# Patient Record
Sex: MALE | Race: WHITE | NOT HISPANIC OR LATINO | Employment: OTHER | URBAN - METROPOLITAN AREA
[De-identification: names, ages, dates, MRNs, and addresses within clinical notes are randomized per-mention and may not be internally consistent; named-entity substitution may affect disease eponyms.]

---

## 2017-01-11 ENCOUNTER — TELEPHONE (OUTPATIENT)
Dept: CARDIOLOGY | Facility: CLINIC | Age: 57
End: 2017-01-11

## 2017-01-11 NOTE — TELEPHONE ENCOUNTER
I left a message for the patient to call for the results of his Zio patch.  Per Dr. Ventura, rare PACs and PVCs.

## 2017-01-11 NOTE — TELEPHONE ENCOUNTER
I talked to the patient about the results of his Zio patch.  There really is no EKG evidence for his syncopal episodes.  He states that he did have multiple episodes while he was wearing the patch.  I do not think that his syncopal episodes are cardiac in etiology.  He recently had some lab work done that showed some abnormalities in his thyroid and other hormones.  He is going to see an endocrinologist for this.

## 2017-01-24 ENCOUNTER — OFFICE VISIT (OUTPATIENT)
Dept: ENDOCRINOLOGY | Age: 57
End: 2017-01-24

## 2017-01-24 VITALS
BODY MASS INDEX: 27.34 KG/M2 | WEIGHT: 213 LBS | HEART RATE: 68 BPM | HEIGHT: 74 IN | SYSTOLIC BLOOD PRESSURE: 108 MMHG | DIASTOLIC BLOOD PRESSURE: 78 MMHG

## 2017-01-24 DIAGNOSIS — R79.89 LOW TESTOSTERONE LEVEL IN MALE: ICD-10-CM

## 2017-01-24 DIAGNOSIS — E03.8 OTHER SPECIFIED HYPOTHYROIDISM: Primary | ICD-10-CM

## 2017-01-24 PROCEDURE — 99244 OFF/OP CNSLTJ NEW/EST MOD 40: CPT | Performed by: INTERNAL MEDICINE

## 2017-01-24 RX ORDER — LEVOTHYROXINE SODIUM 0.07 MG/1
75 TABLET ORAL DAILY
Qty: 30 TABLET | Refills: 11 | Status: SHIPPED | OUTPATIENT
Start: 2017-01-24 | End: 2017-08-24 | Stop reason: SDUPTHER

## 2017-01-24 NOTE — PROGRESS NOTES
Cc : Hypothyroidism     Joel Keating 56 y.o. WM presents as a new patient for the evaluation of Hypothyroidism. Referred by Dr. Hoover.   Diagnosed with Hypothyroidism for about 7 - 8 years, based on routine blood work up.   Pt has been started on Liothyronine 25 mcg oral daily.     He complains of feeling tired all the time thinks it is secondary to fibromyalgia, weight is stable but lost about 60 pounds in the last 8 years, c/o constipation and c/o diarrhea but constipation is more common, c/o hair loss from his chest, underarms and arms, c/o increased sweating, with night sweats. c/o heat intolerance and no cold intolerance. c/o tremors, c/o racing of heart due to orthostatic hypotension and tachycardia and no eye symptoms.   Denied c/o difficulty breathing, swallowing and reports change in voice.   No family hx of thyroid disease.     DEXA scan - 05/11/2015 - Normal bone scan.     Patient also is concerned about his testosterone, he reports that his levels have been checked by his physician and that they have been low.  He is concerned with the symptoms of decreased energy, decreased libido, loss of hair.  Patient is on hydrocodone and methadone.  History of blood clots    I have reviewed the patient's allergies, medicines, past medical hx, family hx and social hx in detail.    Past Medical History   Diagnosis Date   • Abdominal pain      Recurrent sx and follows with Dr. Heard.   • Abscess of buttock    • Acute gastritis    • Allergic rhinitis    • Alopecia    • Angina pectoris    • Anxiety    • Arteriosclerotic coronary artery disease    • Benign prostatic hypertrophy (BPH) with nocturia    • Calf tenderness    • Cervical neck pain with evidence of disc disease    • DDD (degenerative disc disease), lumbar    • Degeneration of cervical intervertebral disc    • Depression    • Esophageal reflux      Follows with Dr. Jackson   • Fatigue    • Fever 04/2015     Low grade symptoms of fever for 3 weeks.   •  Fibromyalgia    • Gastroparesis    • Health care maintenance    • Heart palpitations    • History of deep vein thrombophlebitis of lower extremity 2003     DVT Dx 2003   • Hypokalemia    • Idiopathic peripheral neuropathy    • Impaired fasting glucose    • Insomnia    • Lichen planus      tongue   • LOM (loss of memory)    • Mental status change      Episodes where he stares ahead and have some shaking of his limbs.  Possible seizure activity.  Referred to Dr. Santana, October 2, 2013.   • Nausea    • Onychomycosis of toenail    • Osteoarthritis of hip    • PN (peripheral neuropathy)    • Primary hypothyroidism    • Rash    • Sleep apnea    • Tendonitis of left hip    • Tendonitis of right hip    • TIA (transient ischemic attack) 06/2016   • Tinea versicolor        Family History   Problem Relation Age of Onset   • Heart failure Mother      Congestive   • Heart disease Mother    • Atrial fibrillation Mother    • Alcohol abuse Paternal Uncle    • Heart failure Paternal Grandmother      Congestive   • No Known Problems Father    • No Known Problems Sister    • No Known Problems Brother    • Heart failure Maternal Grandmother    • Heart disease Maternal Grandmother    • No Known Problems Maternal Grandfather    • No Known Problems Paternal Grandfather        Social History     Social History   • Marital status:      Spouse name: N/A   • Number of children: 2   • Years of education: N/A     Occupational History   • Retired      Social History Main Topics   • Smoking status: Never Smoker   • Smokeless tobacco: Former User     Types: Chew   • Alcohol use No   • Drug use: No   • Sexual activity: Defer     Other Topics Concern   • Not on file     Social History Narrative       Allergies   Allergen Reactions   • Butorphanol    • Iodinated Diagnostic Agents    • Iodine    • Other      Vistra 650 Tabs   • Phenytoin    • Amoxicillin Rash   • Clindamycin/Lincomycin Rash         Current Outpatient Prescriptions:   •   escitalopram (LEXAPRO) 20 MG tablet, Take 1 tablet by mouth daily., Disp: , Rfl:   •  esomeprazole (NEXIUM 24HR) 20 MG capsule, Take 20 mg by mouth every morning before breakfast., Disp: , Rfl:   •  fluticasone (FLONASE) 50 MCG/ACT nasal spray, 1 spray into each nostril 2 (two) times a day. Administer 2 sprays in each nostril for each dose., Disp: 3 each, Rfl: 3  •  gabapentin (NEURONTIN) 600 MG tablet, Take 1 tablet by mouth 6 (six) times a day., Disp: , Rfl:   •  HYDROcodone-acetaminophen (NORCO) 7.5-325 MG per tablet, Take 1 tablet by mouth 3 (three) times a day as needed., Disp: , Rfl:   •  lisdexamfetamine (VYVANSE) 70 MG capsule, Take 1 capsule by mouth every morning., Disp: , Rfl:   •  meloxicam (MOBIC) 7.5 MG tablet, Take 7.5 mg by mouth daily as needed., Disp: , Rfl:   •  methadone (DOLOPHINE) 10 MG tablet, Take 20 mg by mouth 2 (two) times a day as needed , , Disp: , Rfl:   •  nortriptyline (PAMELOR) 75 MG capsule, Take 1 capsule by mouth daily., Disp: , Rfl:   •  ondansetron (ZOFRAN) 4 MG tablet, Take 4 mg by mouth every 6 (six) hours as needed for nausea or vomiting. Take for nausea and vomiting., Disp: , Rfl:   •  Sennosides (SENNA) 8.6 MG capsule, Take 1 capsule by mouth daily as needed., Disp: , Rfl:   •  sucralfate (CARAFATE) 1 GM/10ML suspension, Take 10 mL by mouth 4 (four) times a day as needed., Disp: , Rfl:   •  tamsulosin (FLOMAX) 0.4 MG capsule 24 hr capsule, Take 1 capsule by mouth daily., Disp: , Rfl:   •  TiZANidine (ZANAFLEX) 4 MG capsule, Take 1-2 capsules by mouth at night as needed., Disp: , Rfl:   •  Unable to find, Domperidone POWD; 1 tablet twice daily., Disp: , Rfl:   •  levothyroxine (SYNTHROID) 75 MCG tablet, Take 1 tablet by mouth Daily., Disp: 30 tablet, Rfl: 11     Review of Systems   Constitutional: Positive for appetite change and fatigue. Negative for chills and diaphoresis.   HENT: Positive for hearing loss, trouble swallowing and voice change. Negative for nosebleeds and  "postnasal drip.    Eyes: Positive for visual disturbance. Negative for photophobia and discharge.   Respiratory: Positive for shortness of breath. Negative for cough and wheezing.    Cardiovascular: Positive for leg swelling. Negative for chest pain and palpitations.   Gastrointestinal: Positive for abdominal pain, constipation, diarrhea, nausea and vomiting. Negative for abdominal distention.   Endocrine: Positive for heat intolerance. Negative for cold intolerance, polydipsia and polyuria.   Genitourinary: Negative for dysuria, frequency and hematuria.   Musculoskeletal: Positive for arthralgias, back pain and myalgias.   Skin: Positive for rash. Negative for pallor and wound.   Neurological: Positive for dizziness, tremors, syncope, weakness, numbness and headaches. Negative for seizures.   Hematological: Negative for adenopathy.   Psychiatric/Behavioral: Positive for agitation, confusion and sleep disturbance. The patient is not nervous/anxious.        Objective:    Visit Vitals   • /78   • Pulse 68   • Ht 74\" (188 cm)   • Wt 213 lb (96.6 kg)   • BMI 27.35 kg/m2       Physical Exam   Constitutional: He is oriented to person, place, and time. He appears well-developed and well-nourished.   HENT:   Head: Normocephalic and atraumatic.   Mouth/Throat: Oropharynx is clear and moist.   Eyes: Conjunctivae and EOM are normal. Pupils are equal, round, and reactive to light.   Neck: Normal range of motion. Neck supple. Carotid bruit is not present. No tracheal deviation present. No thyromegaly present.   Thyroid palpable and no nodules felt   Cardiovascular: Normal rate and regular rhythm.    Murmur heard.  Pulmonary/Chest: Effort normal and breath sounds normal. No stridor. He has no wheezes.   Abdominal: Soft. Bowel sounds are normal. He exhibits no distension. There is no tenderness. No hernia.   Musculoskeletal: Normal range of motion. He exhibits no edema.   Lymphadenopathy:     He has no cervical adenopathy. "   Neurological: He is alert and oriented to person, place, and time. He has normal reflexes.   Skin: Skin is warm and dry.   Psychiatric: He has a normal mood and affect. His behavior is normal. Judgment normal.   Vitals reviewed.      Results Review:    I reviewed the patient's new clinical results.    Admission on 01/06/2017, Discharged on 01/06/2017   Component Date Value Ref Range Status   • Rapid Strep A Screen 01/06/2017 Negative  Negative, VALID, INVALID, Not Performed Final   • Internal Control 01/06/2017 Passed  Passed Final   • Lot Number 01/06/2017 [id number]   Final   • Expiration Date 01/06/2017 6/2018   Final         Diagnoses and all orders for this visit:    Other specified hypothyroidism  -     TSH; Future  -     Hemoglobin A1c; Future  -     TestT+TestF+SHBG; Future  -     Lipid Panel; Future  -     T3, Free; Future  -     T4, Free; Future  -     Thyroid Peroxidase (TPO) Ab; Future    Low testosterone level in male  -     TSH; Future  -     Hemoglobin A1c; Future  -     TestT+TestF+SHBG; Future  -     Lipid Panel; Future  -     T3, Free; Future  -     T4, Free; Future  -     Thyroid Peroxidase (TPO) Ab; Future    Other orders  -     levothyroxine (SYNTHROID) 75 MCG tablet; Take 1 tablet by mouth Daily.    Hypothyroidism  Advised patient to stop Cytomel  Will start patient on levothyroxin 75 µg oral daily  Will check TSH, free T4, TPO antibody prior to next visit    Low testosterone levels  Advised patient that we would discuss more about this issue during his next visit  However we had to go extensively about the side effects of testosterone given his prior history of blood clots.  Also explained to the patient that his testosterone levels could be low due to the fact that he is on the medications.    The total time spent more than 60 min for old record and lab review and face- to- face, of which greater than 50% of time was spent in counseling the patient on treatment options, instructions for  management/treatment and follow up and importance of compliance with chosen management or treatment options.       Janelle Serna MD  01/24/17

## 2017-01-24 NOTE — MR AVS SNAPSHOT
[facility] ENDOCRINOLOGY  [phone]                    Joel Keating   1/24/2017 1:45 PM   Office Visit    Dept Phone:  [phone]   Encounter #:  [id number]    Provider:  Janelle Serna MD   Department:  [facility] ENDOCRINOLOGY                Your Full Care Plan              Today's Medication Changes          These changes are accurate as of: 1/24/17  2:42 PM.  If you have any questions, ask your nurse or doctor.               New Medication(s)Ordered:     levothyroxine 75 MCG tablet   Commonly known as:  SYNTHROID   Take 1 tablet by mouth Daily.   Started by:  Janelle Serna MD         Stop taking medication(s)listed here:     azithromycin 250 MG tablet   Commonly known as:  ZITHROMAX   Stopped by:  Jnaelle Serna MD           liothyronine 25 MCG tablet   Commonly known as:  CYTOMEL   Stopped by:  Janelle Serna MD           uribel 118 MG capsule capsule   Stopped by:  Janelle Serna MD                Where to Get Your Medications      These medications were sent to [address] [phone] [facility] [phone] [place] [address]     Phone:  [phone]     levothyroxine 75 MCG tablet                  Your Updated Medication List          This list is accurate as of: 1/24/17  2:42 PM.  Always use your most recent med list.                CARAFATE 1 GM/10ML suspension   Generic drug:  sucralfate       fluticasone 50 MCG/ACT nasal spray   Commonly known as:  FLONASE   1 spray into each nostril 2 (two) times a day. Administer 2 sprays in each nostril for each dose.       gabapentin 600 MG tablet   Commonly known as:  NEURONTIN       HYDROcodone-acetaminophen 7.5-325 MG per tablet   Commonly known as:  NORCO       levothyroxine 75 MCG tablet   Commonly known as:  SYNTHROID   Take 1 tablet by mouth Daily.       LEXAPRO 20 MG tablet   Generic drug:  escitalopram          meloxicam 7.5 MG tablet   Commonly known as:  MOBIC       methadone 10 MG tablet   Commonly known as:  DOLOPHINE       NEXIUM 24HR 20 MG capsule   Generic drug:  esomeprazole       nortriptyline 75 MG capsule   Commonly known as:  PAMELOR       ondansetron 4 MG tablet   Commonly known as:  ZOFRAN       Senna 8.6 MG capsule       tamsulosin 0.4 MG capsule 24 hr capsule   Commonly known as:  FLOMAX       TiZANidine 4 MG capsule   Commonly known as:  ZANAFLEX       Unable to find       VYVANSE 70 MG capsule   Generic drug:  lisdexamfetamine               You Were Diagnosed With        Codes Comments    Other specified hypothyroidism    -  Primary ICD-10-CM: E03.8  ICD-9-CM: 244.8     Low testosterone level in male     ICD-10-CM: E29.1  ICD-9-CM: 257.2       Instructions     None    Patient Instructions History      Upcoming Appointments     Visit Type Date Time Department    NEW PATIENT 2017  1:45 PM [facility]    FOLLOW UP 2017  9:15 AM [facility] [place]    [facility] 2017  8:40 AM [facility]    OFFICE VISIT 2017  9:45 AM [facility]      [facility] Signup     [facility] [employer] allows you to send messages to your doctor, view your test results, renew your prescriptions, schedule appointments, and more. To sign up, go to [employer] and click on the Sign Up Now link in the New User? box. Enter your [employer] Activation Code exactly as it appears below along with the last four digits of your Social Security Number and your Date of Birth () to complete the sign-up process. If you do not sign up before the expiration date, you must request a new code.    [employer] Activation Code: [email or web address]  Expires: 2017  2:39 PM    If you have questions, you can email [email or web address] or call [phone] to talk to our Cloud[employer] staff. Remember, Cloud[employer] is NOT to be used for urgent needs. For medical emergencies, dial 911.               Other Info from  "Your Visit           Your Appointments     Jan 25, 2017  9:15 AM EST   Follow Up with Brady Ventura MD   [facility] CARDIOLOGY (--)    [address]  [facility] [address] [phone]           Arrive 15 minutes prior to appointment.            Apr 11, 2017  8:40 AM EDT   [facility] with [facility], [facility]   [facility] ENDOCRINOLOGY (--)    [address]  [place] [address]   [phone]            Apr 24, 2017  9:45 AM EDT   Office Visit with Janelle Serna MD   [facility] ENDOCRINOLOGY (--)    [address]  [facility] [address] [phone]           Arrive 15 minutes prior to appointment.              Allergies     Butorphanol      Iodinated Diagnostic Agents      Iodine      Other      Vistra 650 Tabs    Phenytoin      Amoxicillin  Rash    Clindamycin/lincomycin  Rash      Vital Signs     Blood Pressure Pulse Height Weight Body Mass Index Smoking Status    108/78 68 74\" (188 cm) 213 lb (96.6 kg) 27.35 kg/m2 Never Smoker      Problems and Diagnoses Noted     Underactive thyroid    Low testosterone level in male            "

## 2017-01-24 NOTE — LETTER
January 24, 2017     Cesar Hoover MD  [address] [facility]  [address]  [place] [id number]    Patient: Joel Keating   YOB: 1960   Date of Visit: 1/24/2017       Dear Dr. Kiko MD:    Thank you for referring Joel Keating to me for evaluation. Below are the relevant portions of my assessment and plan of care.                   If you have questions, please do not hesitate to call me. I look forward to following Joel along with you.         Sincerely,        Janelle Serna MD        CC: No Recipients

## 2017-01-24 NOTE — LETTER
January 24, 2017     Cesar Hoover MD  [address] [facility]  [address]  [place] [id number]    Patient: Joel Keating   YOB: 1960   Date of Visit: 1/24/2017       Dear Dr. Kiko MD:    Joel Zuri was in my office today. Below are the relevant portions of my assessment and plan of care.           If you have questions, please do not hesitate to call me. I look forward to following Joel along with you.         Sincerely,        Janelle Serna MD        CC: No Recipients

## 2017-01-25 ENCOUNTER — OFFICE VISIT (OUTPATIENT)
Dept: CARDIOLOGY | Facility: CLINIC | Age: 57
End: 2017-01-25

## 2017-01-25 VITALS
HEART RATE: 99 BPM | DIASTOLIC BLOOD PRESSURE: 62 MMHG | WEIGHT: 210 LBS | HEIGHT: 74 IN | SYSTOLIC BLOOD PRESSURE: 116 MMHG | BODY MASS INDEX: 26.95 KG/M2

## 2017-01-25 DIAGNOSIS — I95.1 ORTHOSTASIS: ICD-10-CM

## 2017-01-25 DIAGNOSIS — I25.10 CORONARY ARTERY DISEASE INVOLVING NATIVE HEART WITHOUT ANGINA PECTORIS, UNSPECIFIED VESSEL OR LESION TYPE: Primary | ICD-10-CM

## 2017-01-25 DIAGNOSIS — R55 SYNCOPE, UNSPECIFIED SYNCOPE TYPE: ICD-10-CM

## 2017-01-25 PROCEDURE — 99213 OFFICE O/P EST LOW 20 MIN: CPT | Performed by: INTERNAL MEDICINE

## 2017-01-25 PROCEDURE — 93000 ELECTROCARDIOGRAM COMPLETE: CPT | Performed by: INTERNAL MEDICINE

## 2017-01-25 NOTE — MR AVS SNAPSHOT
Joel Keating   1/25/2017 9:15 AM   Office Visit    Dept Phone:  [phone]   Encounter #:  [id number]    Provider:  Brady Ventura MD   Department:  [facility] CARDIOLOGY                Your Full Care Plan              Your Updated Medication List          This list is accurate as of: 1/25/17  9:36 AM.  Always use your most recent med list.                CARAFATE 1 GM/10ML suspension   Generic drug:  sucralfate       fluticasone 50 MCG/ACT nasal spray   Commonly known as:  FLONASE   1 spray into each nostril 2 (two) times a day. Administer 2 sprays in each nostril for each dose.       gabapentin 600 MG tablet   Commonly known as:  NEURONTIN       HYDROcodone-acetaminophen 7.5-325 MG per tablet   Commonly known as:  NORCO       levothyroxine 75 MCG tablet   Commonly known as:  SYNTHROID   Take 1 tablet by mouth Daily.       LEXAPRO 20 MG tablet   Generic drug:  escitalopram       meloxicam 7.5 MG tablet   Commonly known as:  MOBIC       methadone 10 MG tablet   Commonly known as:  DOLOPHINE       NEXIUM 24HR 20 MG capsule   Generic drug:  esomeprazole       nortriptyline 75 MG capsule   Commonly known as:  PAMELOR       ondansetron 4 MG tablet   Commonly known as:  ZOFRAN       Senna 8.6 MG capsule       tamsulosin 0.4 MG capsule 24 hr capsule   Commonly known as:  FLOMAX       TiZANidine 4 MG capsule   Commonly known as:  ZANAFLEX       Unable to find       VYVANSE 70 MG capsule   Generic drug:  lisdexamfetamine               Instructions     None    Patient Instructions History      Upcoming Appointments     Visit Type Date Time Department    FOLLOW UP 1/25/2017  9:15 AM [facility] [place]    [facility] 4/11/2017  8:40 AM [facility]    OFFICE VISIT 4/24/2017  9:45 AM [facility]    FOLLOW UP 7/26/2017 10:00 AM [facility] [place]      [facility] Signup     [facility] allows you to send messages to your doctor, view your test results, renew  "your prescriptions, schedule appointments, and more. To sign up, go to [email or web address] and click on the Sign Up Now link in the New User? box. Enter your FRESS Activation Code exactly as it appears below along with the last four digits of your Social Security Number and your Date of Birth () to complete the sign-up process. If you do not sign up before the expiration date, you must request a new code.    FRESS Activation Code: [email or web address]  Expires: 2017  2:39 PM    If you have questions, you can email [email or web address] or call [phone] to talk to our FRESS staff. Remember, FRESS is NOT to be used for urgent needs. For medical emergencies, dial 911.               Other Info from Your Visit           Your Appointments     2017  8:40 AM EDT   [facility]   [facility] ENDOCRINOLOGY (--)    [address]  [place] [id number]   [phone]            2017  9:45 AM EDT   Office Visit with Janelle Serna MD   [facility] ENDOCRINOLOGY (--)    [address]  [place] [address]   [phone]           Arrive 15 minutes prior to appointment.            2017 10:00 AM EDT   Follow Up with Brady Ventura MD   [facility] CARDIOLOGY (--)    [address]  [place] [address]   [phone]           Arrive 15 minutes prior to appointment.              Allergies     Butorphanol      Iodinated Diagnostic Agents      Iodine      Other      Vistra 650 Tabs    Phenytoin      Amoxicillin  Rash    Clindamycin/lincomycin  Rash      Reason for Visit     Coronary Artery Disease 6 WEEK F/U      Vital Signs     Blood Pressure Pulse Height Weight Body Mass Index Smoking Status    116/62 99 74\" (188 cm) 210 lb (95.3 kg) 26.96 kg/m2 Never Smoker        "

## 2017-01-26 NOTE — PROGRESS NOTES
Subjective:     Encounter Date:01/25/2017      Patient ID: Joel Keating is a 56 y.o. male.    Chief Complaint: CAD, orthostasis, syncope    History of Present Illness     Dear Dr. Hoover,     It was a pleasure to see your patient Joel Keating in cardiac followup today.  As you well know, he is a odalis 56-year-old man with history of chronic pain and fibromyalgia.  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      Since that time, he has not had any significant cardiac issues.  He saw my physician's assistant in 12/2016 for complaints of orthostasis and syncope.  She ordered a nuclear perfusion stress test which revealed no evidence of ischemia.  There was some mildly abnormal EKG during the study.  A Holter monitor demonstrated no significant arrhythmias.     Currently he states that he is getting his testosterone and thyroid treated with endocrinology.  He hopes that this will help him feel better over time.  He is treating his orthostasis by changing his habits.  He gets up slowly now.         Review of Systems   All other systems reviewed and are negative.        ECG 12 Lead  Date/Time: 1/26/2017 3:21 PM  Performed by: PURNIMA MARQUEZ  Authorized by: PURNIMA MARQUEZ   Comparison: compared with previous ECG   Similar to previous ECG  Rhythm: sinus rhythm  BPM: 99  QRS axis: left                 Objective:     Physical Exam   Constitutional: He is oriented to person, place, and time. He appears well-developed and well-nourished.   HENT:   Head: Normocephalic and atraumatic.   Neck: Normal range of motion. Neck supple.   Cardiovascular: Normal rate, regular rhythm and normal heart sounds.    Pulmonary/Chest: Effort normal and breath sounds normal.   Abdominal: Soft. Bowel sounds are normal.   Musculoskeletal: Normal range of motion.   Neurological: He is alert and oriented to person, place, and time.   Skin: Skin is  warm and dry.   Psychiatric: He has a normal mood and affect. His behavior is normal. Thought content normal.   Vitals reviewed.      Lab Review:       Assessment:          Diagnosis Plan   1. Coronary artery disease involving native heart without angina pectoris, unspecified vessel or lesion type     2. Syncope, unspecified syncope type     3. Orthostasis            Plan:        It was a pleasure to see your patient in cardiac followup today.  He is doing well from the cardiac standpoint.  He has mild coronary disease that is asymptomatic.  He has orthostasis.  I have urged him to wear compression stockings and to liberalize the sodium in his diet.  I hope that his symptoms will continue to improve as his endocrine problems get treated.  He will see me again in six months or sooner if symptoms warrant.        Coronary Artery Disease  Assessment  • The patient has no angina    Plan  • Lifestyle modifications discussed include adhering to a heart healthy diet, avoidance of tobacco products, maintenance of a healthy weight, medication compliance, regular exercise and regular monitoring of cholesterol and blood pressure

## 2017-04-06 ENCOUNTER — OFFICE VISIT (OUTPATIENT)
Dept: INTERNAL MEDICINE | Facility: CLINIC | Age: 57
End: 2017-04-06

## 2017-04-06 VITALS
DIASTOLIC BLOOD PRESSURE: 56 MMHG | OXYGEN SATURATION: 98 % | HEART RATE: 87 BPM | TEMPERATURE: 99 F | HEIGHT: 74 IN | BODY MASS INDEX: 28.4 KG/M2 | SYSTOLIC BLOOD PRESSURE: 114 MMHG | WEIGHT: 221.3 LBS

## 2017-04-06 DIAGNOSIS — M79.7 FIBROMYALGIA: ICD-10-CM

## 2017-04-06 DIAGNOSIS — M51.36 DEGENERATION OF INTERVERTEBRAL DISC OF LUMBAR REGION: ICD-10-CM

## 2017-04-06 DIAGNOSIS — B35.1 ONYCHOMYCOSIS OF TOENAIL: ICD-10-CM

## 2017-04-06 DIAGNOSIS — M16.0 PRIMARY OSTEOARTHRITIS OF BOTH HIPS: ICD-10-CM

## 2017-04-06 DIAGNOSIS — F51.01 PRIMARY INSOMNIA: ICD-10-CM

## 2017-04-06 DIAGNOSIS — F41.9 ANXIETY: ICD-10-CM

## 2017-04-06 DIAGNOSIS — G89.4 CHRONIC PAIN SYNDROME: ICD-10-CM

## 2017-04-06 DIAGNOSIS — E03.8 OTHER SPECIFIED HYPOTHYROIDISM: Primary | ICD-10-CM

## 2017-04-06 DIAGNOSIS — R73.01 IMPAIRED FASTING GLUCOSE: ICD-10-CM

## 2017-04-06 DIAGNOSIS — F33.1 MODERATE EPISODE OF RECURRENT MAJOR DEPRESSIVE DISORDER (HCC): ICD-10-CM

## 2017-04-06 DIAGNOSIS — R53.83 FATIGUE, UNSPECIFIED TYPE: ICD-10-CM

## 2017-04-06 PROCEDURE — 99214 OFFICE O/P EST MOD 30 MIN: CPT | Performed by: FAMILY MEDICINE

## 2017-04-06 RX ORDER — NALOXONE HYDROCHLORIDE 2 MG/.4ML
INJECTION, SOLUTION INTRAMUSCULAR; SUBCUTANEOUS AS NEEDED
COMMUNITY
Start: 2017-02-07 | End: 2021-05-17

## 2017-04-06 NOTE — PROGRESS NOTES
Subjective   Joel Keating is a 56 y.o. male.     Chief Complaint   Patient presents with   • follow up to peripheral neuropathy   • follow up to fibromyalgia    hypothyroidism hyperglycemia      History of Present Illness   Patient comes in for follow-up of chronic medical problems that are mostly pain related and at this point he feels that everything is gone through over the years he has had the most stable position and bite continue same treatments he is followed by multiple specialists for various concerns neurology pain management endocrinology, psychiatry and continue the same  The following portions of the patient's history were reviewed and updated as appropriate: allergies, current medications, past family history, past medical history, past social history, past surgical history and problem list.    Review of Systems   Eyes: Negative.    Respiratory: Negative.    Cardiovascular: Negative.    Gastrointestinal: Negative.    Endocrine: Negative.    Genitourinary: Negative.    Musculoskeletal: Positive for back pain, gait problem, myalgias and neck pain.   Skin: Negative.    Allergic/Immunologic: Negative.    Neurological: Positive for weakness and headaches.   Hematological: Negative.    Psychiatric/Behavioral: Positive for dysphoric mood and sleep disturbance. The patient is nervous/anxious.        Objective   Physical Exam   Constitutional: He is oriented to person, place, and time. He appears well-developed and well-nourished.   HENT:   Head: Normocephalic and atraumatic.   Eyes: Conjunctivae are normal.   Musculoskeletal: He exhibits tenderness. He exhibits no edema.   Neurological: He is alert and oriented to person, place, and time.   Skin: Skin is warm and dry.   Psychiatric: He has a normal mood and affect. His behavior is normal. Judgment and thought content normal.   Nursing note and vitals reviewed.      Assessment/Plan   Joel was seen today for follow up to peripheral neuropathy and follow up to  fibromyalgia.    Diagnoses and all orders for this visit:    Other specified hypothyroidism    Impaired fasting glucose    Primary osteoarthritis of both hips    Degeneration of intervertebral disc of lumbar region    Onychomycosis of toenail    Fibromyalgia    Chronic pain syndrome    Primary insomnia    Moderate episode of recurrent major depressive disorder    Anxiety    Fatigue, unspecified type    Chronic medical problems related as above unable to work up as needed or in 6 months continue regular visits with  subspecialty care

## 2017-04-11 ENCOUNTER — LAB (OUTPATIENT)
Dept: ENDOCRINOLOGY | Age: 57
End: 2017-04-11

## 2017-04-11 DIAGNOSIS — E03.8 OTHER SPECIFIED HYPOTHYROIDISM: ICD-10-CM

## 2017-04-11 DIAGNOSIS — R79.89 LOW TESTOSTERONE LEVEL IN MALE: ICD-10-CM

## 2017-04-14 LAB
CHOLEST SERPL-MCNC: 194 MG/DL (ref 0–200)
CONV COMMENT: ABNORMAL
HBA1C MFR BLD: 5.7 % (ref 4.8–5.6)
HDLC SERPL-MCNC: 38 MG/DL (ref 40–60)
LDLC SERPL CALC-MCNC: 103 MG/DL (ref 0–100)
SHBG SERPL-SCNC: 77.9 NMOL/L (ref 19.3–76.4)
T3FREE SERPL-MCNC: 2.5 PG/ML (ref 2–4.4)
T4 FREE SERPL-MCNC: 1.14 NG/DL (ref 0.93–1.7)
TESTOST FREE SERPL-MCNC: 1.7 PG/ML (ref 7.2–24)
TESTOST SERPL-MCNC: 256 NG/DL (ref 348–1197)
THYROPEROXIDASE AB SERPL-ACNC: 9 IU/ML (ref 0–34)
TRIGL SERPL-MCNC: 267 MG/DL (ref 0–150)
TSH SERPL DL<=0.005 MIU/L-ACNC: 2.58 MIU/ML (ref 0.27–4.2)
VLDLC SERPL CALC-MCNC: 53.4 MG/DL (ref 5–40)

## 2017-04-24 ENCOUNTER — OFFICE VISIT (OUTPATIENT)
Dept: ENDOCRINOLOGY | Age: 57
End: 2017-04-24

## 2017-04-24 VITALS
DIASTOLIC BLOOD PRESSURE: 82 MMHG | HEIGHT: 74 IN | BODY MASS INDEX: 28.57 KG/M2 | HEART RATE: 80 BPM | WEIGHT: 222.6 LBS | OXYGEN SATURATION: 93 % | SYSTOLIC BLOOD PRESSURE: 118 MMHG

## 2017-04-24 DIAGNOSIS — R73.03 PRE-DIABETES: ICD-10-CM

## 2017-04-24 DIAGNOSIS — E03.8 OTHER SPECIFIED HYPOTHYROIDISM: Primary | ICD-10-CM

## 2017-04-24 DIAGNOSIS — R79.89 LOW TESTOSTERONE LEVEL IN MALE: ICD-10-CM

## 2017-04-24 DIAGNOSIS — E78.49 OTHER HYPERLIPIDEMIA: ICD-10-CM

## 2017-04-24 PROCEDURE — 99214 OFFICE O/P EST MOD 30 MIN: CPT | Performed by: INTERNAL MEDICINE

## 2017-04-24 NOTE — PROGRESS NOTES
56 y.o.    Patient Care Team:  Cesar Hoover MD as PCP - General  Cesar Hoover MD as PCP - Family Medicine    Chief Complaint:  Hypothyroidism    Subjective     HPI  Joel Keating 56 y.o. WM presents as a follow up patient for the evaluation of Hypothyroidism. Referred by Dr. Hoover.   Diagnosed with Hypothyroidism for about 7 - 8 years, based on routine blood work up.     Currently on levothyroxine 75 µg oral daily.  Patient is compliant with his medications takes the medication on empty stomach.    Today in the clinic patient still complains of feeling tired all the time thinks it is related to fibromyalgia, reports that he is gaining weight, complains of constipation, complains of hair loss on his chest, legs, underarms, complains of increased night sweats.  Planes of heat intolerance.  No complaints of tremors, complains of racing of heart due to orthostatic hypotension and no eye symptoms.   Denied c/o difficulty breathing, swallowing and reports change in voice.   No family hx of thyroid disease.      DEXA scan - 05/11/2015 - Normal bone scan.     Quoydozktsyqlx-EVR-119 mg/dL.    Low testosterone levels-patient noted to have low total testosterone, free testosterone levels.  Also noted to be on opiates.  History of DVT.     Office note patient got his recent blood work up from his GI doctor-wanted to discuss about his elevated bilirubin and creatinine levels.  I deferred this to Dr. Cox and Dr. Hoover.    Interval History      The following portions of the patient's history were reviewed and updated as appropriate: allergies, current medications, past family history, past medical history, past social history, past surgical history and problem list.    Past Medical History:   Diagnosis Date   • Abdominal pain     Recurrent sx and follows with Dr. Heard.   • Abscess of buttock    • Acute gastritis    • Allergic rhinitis    • Alopecia    • Angina pectoris    • Anxiety    • Arteriosclerotic coronary artery  disease    • Benign prostatic hypertrophy (BPH) with nocturia    • Calf tenderness    • Cervical neck pain with evidence of disc disease    • DDD (degenerative disc disease), lumbar    • Degeneration of cervical intervertebral disc    • Depression    • Esophageal reflux     Follows with Dr. Jackson   • Fatigue    • Fever 04/2015    Low grade symptoms of fever for 3 weeks.   • Fibromyalgia    • Gastroparesis    • Health care maintenance    • Heart palpitations    • History of deep vein thrombophlebitis of lower extremity 2003    DVT Dx 2003   • Hypokalemia    • Idiopathic peripheral neuropathy    • Impaired fasting glucose    • Insomnia    • Lichen planus     tongue   • LOM (loss of memory)    • Mental status change     Episodes where he stares ahead and have some shaking of his limbs.  Possible seizure activity.  Referred to Dr. Santana, October 2, 2013.   • Nausea    • Onychomycosis of toenail    • Osteoarthritis of hip    • PN (peripheral neuropathy)    • Primary hypothyroidism    • Rash    • Sleep apnea    • Tendonitis of left hip    • Tendonitis of right hip    • TIA (transient ischemic attack) 06/2016   • Tinea versicolor      Family History   Problem Relation Age of Onset   • Heart failure Mother      Congestive   • Heart disease Mother    • Atrial fibrillation Mother    • Alcohol abuse Paternal Uncle    • Heart failure Paternal Grandmother      Congestive   • No Known Problems Father    • No Known Problems Sister    • No Known Problems Brother    • Heart failure Maternal Grandmother    • Heart disease Maternal Grandmother    • No Known Problems Maternal Grandfather    • No Known Problems Paternal Grandfather      Social History     Social History   • Marital status:      Spouse name: N/A   • Number of children: 2   • Years of education: N/A     Occupational History   • Retired      Social History Main Topics   • Smoking status: Never Smoker   • Smokeless tobacco: Former User     Types: Chew   • Alcohol  use No   • Drug use: No   • Sexual activity: Defer     Other Topics Concern   • Not on file     Social History Narrative     Allergies   Allergen Reactions   • Butorphanol    • Hydroxyzine    • Iodinated Diagnostic Agents    • Iodine    • Other      Vistra 650 Tabs   • Phenytoin    • Amoxicillin Rash   • Clindamycin/Lincomycin Rash       Current Outpatient Prescriptions:   •  escitalopram (LEXAPRO) 20 MG tablet, Take 1 tablet by mouth daily., Disp: , Rfl:   •  esomeprazole (NEXIUM 24HR) 20 MG capsule, Take 20 mg by mouth every morning before breakfast., Disp: , Rfl:   •  EVZIO 2 MG/0.4ML solution auto-injector, , Disp: , Rfl:   •  fluticasone (FLONASE) 50 MCG/ACT nasal spray, 1 spray into each nostril 2 (two) times a day. Administer 2 sprays in each nostril for each dose., Disp: 3 each, Rfl: 3  •  gabapentin (NEURONTIN) 600 MG tablet, Take 1 tablet by mouth 6 (six) times a day., Disp: , Rfl:   •  HYDROcodone-acetaminophen (NORCO) 7.5-325 MG per tablet, Take 1 tablet by mouth 3 (three) times a day as needed., Disp: , Rfl:   •  levothyroxine (SYNTHROID) 75 MCG tablet, Take 1 tablet by mouth Daily., Disp: 30 tablet, Rfl: 11  •  lisdexamfetamine (VYVANSE) 70 MG capsule, Take 1 capsule by mouth every morning., Disp: , Rfl:   •  meloxicam (MOBIC) 7.5 MG tablet, Take 7.5 mg by mouth daily as needed., Disp: , Rfl:   •  methadone (DOLOPHINE) 10 MG tablet, Take 20 mg by mouth 2 (two) times a day as needed , , Disp: , Rfl:   •  nortriptyline (PAMELOR) 75 MG capsule, Take 1 capsule by mouth daily., Disp: , Rfl:   •  ondansetron (ZOFRAN) 4 MG tablet, Take 4 mg by mouth every 6 (six) hours as needed for nausea or vomiting. Take for nausea and vomiting., Disp: , Rfl:   •  Sennosides (SENNA) 8.6 MG capsule, Take 1 capsule by mouth daily as needed., Disp: , Rfl:   •  sucralfate (CARAFATE) 1 GM/10ML suspension, Take 10 mL by mouth 4 (four) times a day as needed., Disp: , Rfl:   •  tamsulosin (FLOMAX) 0.4 MG capsule 24 hr capsule,  "Take 1 capsule by mouth daily., Disp: , Rfl:   •  TiZANidine (ZANAFLEX) 4 MG capsule, Take 1-2 capsules by mouth at night as needed., Disp: , Rfl:   •  Turmeric 450 MG capsule, Take 2 capsules by mouth., Disp: , Rfl:   •  Unable to find, Domperidone POWD; 1 tablet twice daily., Disp: , Rfl:         Review of Systems   Constitutional: Negative for fever.   HENT: Negative for facial swelling, nosebleeds, trouble swallowing and voice change.    Eyes: Negative for pain and redness.   Respiratory: Positive for shortness of breath and wheezing.    Cardiovascular: Positive for leg swelling (occasionally). Negative for palpitations.   Gastrointestinal: Positive for abdominal pain. Negative for diarrhea and vomiting.   Endocrine: Negative for polydipsia and polyuria.   Genitourinary: Negative for decreased urine volume and frequency.   Musculoskeletal: Positive for neck pain. Negative for joint swelling.   Skin: Negative for rash.   Allergic/Immunologic: Negative for immunocompromised state.   Neurological: Negative for seizures and facial asymmetry.   Hematological: Does not bruise/bleed easily.   Psychiatric/Behavioral: Positive for confusion. Negative for agitation.       Objective       Vitals:    04/24/17 0933   BP: 118/82   Pulse: 80   SpO2: 93%   Weight: 222 lb 9.6 oz (101 kg)   Height: 74\" (188 cm)     Body mass index is 28.58 kg/(m^2).      Physical Exam   Constitutional: He is oriented to person, place, and time. He appears well-nourished.   HENT:   Head: Normocephalic and atraumatic.   Eyes: Conjunctivae and EOM are normal.   Neck: Normal range of motion. Neck supple. Carotid bruit is not present. No thyromegaly present.   No acanthosis nigricans   Cardiovascular: Normal rate and normal heart sounds.    Heart rate-76/m   Pulmonary/Chest: Effort normal and breath sounds normal. No stridor. No respiratory distress. He has no wheezes.   Abdominal: Soft. Bowel sounds are normal. He exhibits no distension. There is no " tenderness.   Central obesity   Musculoskeletal: He exhibits no edema or tenderness.   Lymphadenopathy:     He has no cervical adenopathy.   Neurological: He is alert and oriented to person, place, and time.   Fine tremors   Skin: Skin is warm and dry.   Psychiatric: He has a normal mood and affect. His behavior is normal.   Vitals reviewed.    Results Review:     I reviewed the patient's new clinical results.    Medical records reviewed  Summary:  Done      Lab on 04/11/2017   Component Date Value Ref Range Status   • TSH 04/11/2017 2.580  0.270 - 4.200 mIU/mL Final   • Hemoglobin A1C 04/11/2017 5.70* 4.80 - 5.60 % Final    Comment: Hemoglobin A1C Ranges:  Increased Risk for Diabetes  5.7% to 6.4%  Diabetes                     >= 6.5%  Diabetic Goal                < 7.0%     • Testosterone, Total 04/11/2017 256* 348 - 1197 ng/dL Final   • Comment 04/11/2017 Comment   Final    Comment: Adult male reference interval is based on a population of lean males  up to 40 years old.     • Testosterone, Free 04/11/2017 1.7* 7.2 - 24.0 pg/mL Final   • Sex Hormone Binding Globulin 04/11/2017 77.9* 19.3 - 76.4 nmol/L Final   • Total Cholesterol 04/11/2017 194  0 - 200 mg/dL Final   • Triglycerides 04/11/2017 267* 0 - 150 mg/dL Final   • HDL Cholesterol 04/11/2017 38* 40 - 60 mg/dL Final   • VLDL Cholesterol 04/11/2017 53.4* 5 - 40 mg/dL Final   • LDL Cholesterol  04/11/2017 103* 0 - 100 mg/dL Final   • T3, Free 04/11/2017 2.5  2.0 - 4.4 pg/mL Final   • Free T4 04/11/2017 1.14  0.93 - 1.70 ng/dL Final   • Thyroid Peroxidase Antibody 04/11/2017 9  0 - 34 IU/mL Final     Lab Results   Component Value Date    HGBA1C 5.70 (H) 04/11/2017     Lab Results   Component Value Date    CREATININE 1.21 12/08/2016     Imaging Results (most recent)     None                Assessment and Plan:    Diagnoses and all orders for this visit:    Other specified hypothyroidism  -     TSH; Future  -     T4, Free; Future    Low testosterone level in  male  -     TSH; Future  -     T4, Free; Future    Other hyperlipidemia  -     TSH; Future  -     T4, Free; Future  -     Ambulatory Referral to Nutrition Services    Pre-diabetes  -     Ambulatory Referral to Nutrition Services    Hypothyroidism  Continue levothyroxine 75 µg oral daily  Counseled the patient to take the medication every single day on empty stomach.    Hyperlipidemia  Discussed with the patient about diet and exercise.  Will refer the patient to dietitian.    Pre-diabetes-MpH8u-7.7%  Will refer the patient to dietitian for low carbohydrate education.    Low testosterone levels-could be related to opiate use.  Counseled the patient that given his history of DVT and the risk of cardiovascular side effects would not prescribe testosterone replacement.      13 minutes out of 25 minutes face to face spent in counseling the patient extensively on testosterone side effects and following low carbohydrate diet and low-fat diet for borderline diabetes/hyperlipidemia

## 2017-04-24 NOTE — PATIENT INSTRUCTIONS
1. Hba1c - mildly high - places you as borderline Diabetic - Will refer the pt to dietician.   2. Hyperlipidemia - Referring to dietician.   3. Will not prescribe testosterone due to hx of DVT.   4. Testosterone levels could be low due to the fact that you are on opiates.

## 2017-06-20 ENCOUNTER — APPOINTMENT (OUTPATIENT)
Dept: DIABETES SERVICES | Facility: HOSPITAL | Age: 57
End: 2017-06-20
Attending: INTERNAL MEDICINE

## 2017-07-12 ENCOUNTER — HOSPITAL ENCOUNTER (OUTPATIENT)
Dept: DIABETES SERVICES | Facility: HOSPITAL | Age: 57
Discharge: HOME | End: 2017-07-12
Attending: INTERNAL MEDICINE | Admitting: INTERNAL MEDICINE

## 2017-07-12 PROCEDURE — 97802 MEDICAL NUTRITION INDIV IN: CPT

## 2017-07-12 NOTE — CONSULTS
"ConsultsConsult provided for ~ 3124-0633 calorie, consistent carbohydrate, reduced total/saturated fat and refined carbohydrates, with emphasis on lean proteins, high fiber foods and least processed carbohydrates.  Meal plan provided to support healthful eating patterns, emphasizing a variety of nutrient dense foods in appropriate portions sizes to improve weight/glycemic management and overall health.  Also discussed reading the nutrition facts label, using the \"plate method\" as part of meal planning, and the benefits of tolerated activity/exercise as part of lifestyle interventions.  Patient verbalized understanding, but encouraged to contact this RD if further questions/concerns.  "

## 2017-07-26 ENCOUNTER — OFFICE VISIT (OUTPATIENT)
Dept: CARDIOLOGY | Facility: CLINIC | Age: 57
End: 2017-07-26

## 2017-07-26 VITALS
HEART RATE: 90 BPM | DIASTOLIC BLOOD PRESSURE: 62 MMHG | HEIGHT: 74 IN | WEIGHT: 220 LBS | BODY MASS INDEX: 28.23 KG/M2 | SYSTOLIC BLOOD PRESSURE: 118 MMHG

## 2017-07-26 DIAGNOSIS — I95.1 ORTHOSTASIS: ICD-10-CM

## 2017-07-26 DIAGNOSIS — I25.10 CORONARY ARTERY DISEASE INVOLVING NATIVE HEART WITHOUT ANGINA PECTORIS, UNSPECIFIED VESSEL OR LESION TYPE: Primary | ICD-10-CM

## 2017-07-26 PROCEDURE — 99213 OFFICE O/P EST LOW 20 MIN: CPT | Performed by: INTERNAL MEDICINE

## 2017-07-26 PROCEDURE — 93000 ELECTROCARDIOGRAM COMPLETE: CPT | Performed by: INTERNAL MEDICINE

## 2017-07-26 RX ORDER — METHADONE HYDROCHLORIDE 5 MG/1
5 TABLET ORAL 2 TIMES DAILY
COMMUNITY

## 2017-07-26 NOTE — PROGRESS NOTES
Subjective:     Encounter Date:07/26/2017      Patient ID: Joel Keating is a 57 y.o. male.    Chief Complaint: CAD, orthostasis    History of Present Illness    Dear Dr. Hoover,    I had the pleasure of seeing Joel Keating in cardiac follow-up today.  As you know well, he is a odalis 57-year-old man with history of chronic pain.  He has fibromyalgia.  He presented with unstable angina and cardiac catheterization demonstrated only mild coronary disease with a small myocardial bridge of the LAD.    He's had orthostasis and syncope.  This problem continues despite using compression hose and some dietary changes.    He's been struggling with narcotic use.  Because of his chronic pain he's been on methadone but tried to switch to hydrocodone without any benefit.    He has been working with a naturopathic practitioner to try to help with his pain.    Review of Systems   All other systems reviewed and are negative.        ECG 12 Lead  Date/Time: 7/26/2017 10:28 AM  Performed by: PURNIMA MARQUEZ  Authorized by: PURNIMA MARQUEZ   Comparison: compared with previous ECG   Similar to previous ECG  Rhythm: sinus rhythm  BPM: 90  QRS axis: left                 Objective:     Physical Exam   Constitutional: He is oriented to person, place, and time. He appears well-developed and well-nourished.   HENT:   Head: Normocephalic and atraumatic.   Neck: Normal range of motion. Neck supple.   Cardiovascular: Normal rate, regular rhythm and normal heart sounds.    Pulmonary/Chest: Effort normal and breath sounds normal.   Abdominal: Soft. Bowel sounds are normal.   Musculoskeletal: Normal range of motion.   Neurological: He is alert and oriented to person, place, and time.   Skin: Skin is warm and dry.   Psychiatric: He has a normal mood and affect. His behavior is normal. Thought content normal.   Vitals reviewed.      Lab Review:       Assessment:          Diagnosis Plan   1. Coronary artery disease involving native heart without angina  pectoris, unspecified vessel or lesion type     2. Orthostasis            Plan:       It was a pleasure to see the patient in cardiac follow-up today.  He is stable from the standpoint of his myocardial bridge and mild coronary disease.  I've made no changes to his regimen.  He'll see me again in one year or sooner if symptoms warrant.

## 2017-08-14 ENCOUNTER — OFFICE VISIT (OUTPATIENT)
Dept: INTERNAL MEDICINE | Facility: CLINIC | Age: 57
End: 2017-08-14

## 2017-08-14 VITALS
SYSTOLIC BLOOD PRESSURE: 124 MMHG | WEIGHT: 214.2 LBS | BODY MASS INDEX: 27.49 KG/M2 | HEIGHT: 74 IN | DIASTOLIC BLOOD PRESSURE: 68 MMHG | TEMPERATURE: 99.9 F | OXYGEN SATURATION: 96 % | HEART RATE: 109 BPM

## 2017-08-14 DIAGNOSIS — J40 BRONCHITIS: ICD-10-CM

## 2017-08-14 DIAGNOSIS — N28.9 RENAL INSUFFICIENCY: Primary | ICD-10-CM

## 2017-08-14 PROCEDURE — 99214 OFFICE O/P EST MOD 30 MIN: CPT | Performed by: FAMILY MEDICINE

## 2017-08-14 RX ORDER — MONTELUKAST SODIUM 10 MG/1
10 TABLET ORAL NIGHTLY
Qty: 30 TABLET | Refills: 0 | Status: SHIPPED | OUTPATIENT
Start: 2017-08-14 | End: 2017-11-28

## 2017-08-14 NOTE — PROGRESS NOTES
Joel Keating is a 57 y.o. male.  Seen 08/14/2017    Assessment/Plan   Problem List Items Addressed This Visit        Respiratory    Bronchitis    Relevant Medications    montelukast (SINGULAIR) 10 MG tablet       Genitourinary    Renal insufficiency - Primary    Relevant Orders    Comprehensive Metabolic Panel           Patient still has chest congestion we'll had montelukast as since he has persistent nasal rhinitis hopefully this will control his symptoms will follow as follow-up he has regular follow-up with pain management for low back  Follow-up results of  Subjective     Chief Complaint   Patient presents with   • hospital follow up     bronchitis   • Back Pain     low back pain     Social History   Substance Use Topics   • Smoking status: Never Smoker   • Smokeless tobacco: Former User     Types: Chew   • Alcohol use No       History of Present Illness   Patient comes in for follow-up of emergency department visit at [place] records were reviewed chest x-ray was normal he was placed on doxycycline albuterol and Tessalon cough is better however he still is developing breathing with no specific hypoxia or elevated fever he does have some low-grade fever and minimally productive sputum.  Improving much better than he has by now.  Laboratory results were reviewed and elevated creatinine at 1.8  The following portions of the patient's history were reviewed and updated as appropriate:PMHroutine: Social history , Past Medical History, Allergies, Current Medications, Active Problem List and Health Maintenance    Review of Systems   Constitutional: Positive for fatigue and fever. Negative for diaphoresis.   HENT: Positive for congestion and postnasal drip.    Eyes: Negative.    Respiratory: Negative.    Cardiovascular: Negative.    Gastrointestinal: Negative.    Musculoskeletal: Positive for back pain, gait problem and joint swelling.   Skin: Negative.        Objective   Vitals:    08/14/17 1253   BP: 124/68   BP  "Location: Right arm   Patient Position: Sitting   Pulse: 109   Temp: 99.9 °F (37.7 °C)   TempSrc: Oral   SpO2: 96%   Weight: 214 lb 3.2 oz (97.2 kg)   Height: 74\" (188 cm)     Body mass index is 27.5 kg/(m^2).  Physical Exam   Constitutional: He is oriented to person, place, and time. He appears well-developed and well-nourished.  Non-toxic appearance. No distress.   HENT:   Head: Normocephalic and atraumatic.   Right Ear: External ear normal.   Left Ear: External ear normal.   Mouth/Throat: Uvula is midline and mucous membranes are normal. No oral lesions. No uvula swelling. Posterior oropharyngeal erythema present. No oropharyngeal exudate.   Eyes: Conjunctivae and EOM are normal. Pupils are equal, round, and reactive to light. Right eye exhibits no discharge. Left eye exhibits no discharge.   Neck: Neck supple.   Cardiovascular: Normal rate and regular rhythm.    Pulmonary/Chest: Effort normal. No respiratory distress. He has no wheezes. He has rhonchi. He has no rales.   Lymphadenopathy:     He has no cervical adenopathy.   Neurological: He is alert and oriented to person, place, and time.   Skin: No rash noted. He is not diaphoretic.   Nursing note and vitals reviewed.    Reviewed Data:  No visits with results within 1 Month(s) from this visit.  Latest known visit with results is:    Lab on 04/11/2017   Component Date Value Ref Range Status   • TSH 04/11/2017 2.580  0.270 - 4.200 mIU/mL Final   • Hemoglobin A1C 04/11/2017 5.70* 4.80 - 5.60 % Final    Comment: Hemoglobin A1C Ranges:  Increased Risk for Diabetes  5.7% to 6.4%  Diabetes                     >= 6.5%  Diabetic Goal                < 7.0%     • Testosterone, Total 04/11/2017 256* 348 - 1197 ng/dL Final   • Comment 04/11/2017 Comment   Final    Comment: Adult male reference interval is based on a population of lean males  up to 40 years old.     • Testosterone, Free 04/11/2017 1.7* 7.2 - 24.0 pg/mL Final   • Sex Hormone Binding Globulin 04/11/2017 77.9* " 19.3 - 76.4 nmol/L Final   • Total Cholesterol 04/11/2017 194  0 - 200 mg/dL Final   • Triglycerides 04/11/2017 267* 0 - 150 mg/dL Final   • HDL Cholesterol 04/11/2017 38* 40 - 60 mg/dL Final   • VLDL Cholesterol 04/11/2017 53.4* 5 - 40 mg/dL Final   • LDL Cholesterol  04/11/2017 103* 0 - 100 mg/dL Final   • T3, Free 04/11/2017 2.5  2.0 - 4.4 pg/mL Final   • Free T4 04/11/2017 1.14  0.93 - 1.70 ng/dL Final   • Thyroid Peroxidase Antibody 04/11/2017 9  0 - 34 IU/mL Final

## 2017-08-15 LAB
ALBUMIN SERPL-MCNC: 4.8 G/DL (ref 3.5–5.2)
ALBUMIN/GLOB SERPL: 1.8 G/DL
ALP SERPL-CCNC: 99 U/L (ref 39–117)
ALT SERPL-CCNC: 22 U/L (ref 1–41)
AST SERPL-CCNC: 25 U/L (ref 1–40)
BILIRUB SERPL-MCNC: 0.5 MG/DL (ref 0.1–1.2)
BUN SERPL-MCNC: 19 MG/DL (ref 6–20)
BUN/CREAT SERPL: 11.9 (ref 7–25)
CALCIUM SERPL-MCNC: 10.8 MG/DL (ref 8.6–10.5)
CHLORIDE SERPL-SCNC: 99 MMOL/L (ref 98–107)
CO2 SERPL-SCNC: 28 MMOL/L (ref 22–29)
CREAT SERPL-MCNC: 1.59 MG/DL (ref 0.76–1.27)
GLOBULIN SER CALC-MCNC: 2.6 GM/DL
GLUCOSE SERPL-MCNC: 116 MG/DL (ref 65–99)
POTASSIUM SERPL-SCNC: 4.3 MMOL/L (ref 3.5–5.2)
PROT SERPL-MCNC: 7.4 G/DL (ref 6–8.5)
SODIUM SERPL-SCNC: 141 MMOL/L (ref 136–145)

## 2017-08-17 ENCOUNTER — LAB (OUTPATIENT)
Dept: ENDOCRINOLOGY | Age: 57
End: 2017-08-17

## 2017-08-17 ENCOUNTER — TELEPHONE (OUTPATIENT)
Dept: INTERNAL MEDICINE | Facility: CLINIC | Age: 57
End: 2017-08-17

## 2017-08-17 DIAGNOSIS — E03.8 OTHER SPECIFIED HYPOTHYROIDISM: ICD-10-CM

## 2017-08-17 DIAGNOSIS — E78.49 OTHER HYPERLIPIDEMIA: ICD-10-CM

## 2017-08-17 DIAGNOSIS — N28.9 ABNORMAL RENAL FUNCTION: Primary | ICD-10-CM

## 2017-08-17 DIAGNOSIS — R79.89 LOW TESTOSTERONE LEVEL IN MALE: ICD-10-CM

## 2017-08-17 LAB
T4 FREE SERPL-MCNC: 1.34 NG/DL (ref 0.93–1.7)
TSH SERPL DL<=0.005 MIU/L-ACNC: 2.61 MIU/ML (ref 0.27–4.2)

## 2017-08-17 NOTE — TELEPHONE ENCOUNTER
----- Message from Cesar Hoover MD sent at 8/15/2017  2:26 PM EDT -----  Worsening renal function follow-up in nephrology otherwise consult nephrology if has not had previous appointment

## 2017-08-23 ENCOUNTER — TELEPHONE (OUTPATIENT)
Dept: INTERNAL MEDICINE | Facility: CLINIC | Age: 57
End: 2017-08-23

## 2017-08-24 ENCOUNTER — OFFICE VISIT (OUTPATIENT)
Dept: ENDOCRINOLOGY | Age: 57
End: 2017-08-24

## 2017-08-24 VITALS
BODY MASS INDEX: 27.72 KG/M2 | WEIGHT: 216 LBS | SYSTOLIC BLOOD PRESSURE: 110 MMHG | HEART RATE: 86 BPM | DIASTOLIC BLOOD PRESSURE: 68 MMHG | HEIGHT: 74 IN | OXYGEN SATURATION: 98 %

## 2017-08-24 DIAGNOSIS — R53.82 CHRONIC FATIGUE: ICD-10-CM

## 2017-08-24 DIAGNOSIS — E23.0 HYPOGONADOTROPIC HYPOGONADISM (HCC): ICD-10-CM

## 2017-08-24 DIAGNOSIS — R73.03 PRE-DIABETES: ICD-10-CM

## 2017-08-24 DIAGNOSIS — E03.8 OTHER SPECIFIED HYPOTHYROIDISM: Primary | ICD-10-CM

## 2017-08-24 PROCEDURE — 99214 OFFICE O/P EST MOD 30 MIN: CPT | Performed by: INTERNAL MEDICINE

## 2017-08-24 RX ORDER — LEVOTHYROXINE SODIUM 0.07 MG/1
75 TABLET ORAL DAILY
Qty: 90 TABLET | Refills: 3 | Status: SHIPPED | OUTPATIENT
Start: 2017-08-24 | End: 2018-06-06 | Stop reason: SDUPTHER

## 2017-08-24 NOTE — PROGRESS NOTES
57 y.o.    Patient Care Team:  Cesar Hoover MD as PCP - General  Cesar Hoover MD as PCP - Family Medicine    Chief Complaint:    4 Month Follow up/Hypothyroidism  Subjective     HPI  57-year-old white male is here as a follow up for the management of hypothyroidism, hypogonadism.    Hypothyroidism  Currently on levothyroxin 75 µg oral daily.  Takes the medication on empty stomach every single day.  Patient even today in the clinic is extremely concerned about his energy levels and reports that this could be related to her fibromyalgia itself, weight has been stable, complains of constipation, complains of hair loss on his chest and legs and underarms.  Occasional night sweats.  Complains of heat intolerance.  No complaints of tremors, occasional racing of heart due to orthostatic hypotension and no eye symptoms.  No family history of thyroid disease.    Hypogonadism  Due to his complains of fatigue his testosterone levels have been checked by the primary care physician..  Been checked at 10 AM and they have been noted to be abnormal.  Patient does have history of DVT.     Cpxlxzmdezuite-GWU-130 mg/dL.     Borderline diabetes  Patient went for diabetic education and has been educated on diet changes and exercise regimen.     Interval History      The following portions of the patient's history were reviewed and updated as appropriate: allergies, current medications, past family history, past medical history, past social history, past surgical history and problem list.    Past Medical History:   Diagnosis Date   • Abdominal pain     Recurrent sx and follows with Dr. Heard.   • Abscess of buttock    • Acute gastritis    • Allergic rhinitis    • Alopecia    • Angina pectoris    • Anxiety    • Arteriosclerotic coronary artery disease    • Benign prostatic hypertrophy (BPH) with nocturia    • Calf tenderness    • Cervical neck pain with evidence of disc disease    • DDD (degenerative disc disease), lumbar    •  Degeneration of cervical intervertebral disc    • Depression    • Esophageal reflux     Follows with Dr. Jackson   • Fatigue    • Fever 04/2015    Low grade symptoms of fever for 3 weeks.   • Fibromyalgia    • Gastroparesis    • Health care maintenance    • Heart palpitations    • History of deep vein thrombophlebitis of lower extremity 2003    DVT Dx 2003   • Hypokalemia    • Idiopathic peripheral neuropathy    • Impaired fasting glucose    • Insomnia    • Lichen planus     tongue   • LOM (loss of memory)    • Mental status change     Episodes where he stares ahead and have some shaking of his limbs.  Possible seizure activity.  Referred to Dr. Santana, October 2, 2013.   • Nausea    • Onychomycosis of toenail    • Osteoarthritis of hip    • PN (peripheral neuropathy)    • Primary hypothyroidism    • Rash    • Sleep apnea    • Tendonitis of left hip    • Tendonitis of right hip    • TIA (transient ischemic attack) 06/2016   • Tinea versicolor      Family History   Problem Relation Age of Onset   • Heart failure Mother      Congestive   • Heart disease Mother    • Atrial fibrillation Mother    • Alcohol abuse Paternal Uncle    • Heart failure Paternal Grandmother      Congestive   • No Known Problems Father    • No Known Problems Sister    • No Known Problems Brother    • Heart failure Maternal Grandmother    • Heart disease Maternal Grandmother    • No Known Problems Maternal Grandfather    • No Known Problems Paternal Grandfather      Social History     Social History   • Marital status:      Spouse name: N/A   • Number of children: 2   • Years of education: N/A     Occupational History   • Retired      Social History Main Topics   • Smoking status: Never Smoker   • Smokeless tobacco: Former User     Types: Chew   • Alcohol use No   • Drug use: No   • Sexual activity: Defer     Other Topics Concern   • Not on file     Social History Narrative     Allergies   Allergen Reactions   • Butorphanol    •  Hydroxyzine    • Iodinated Diagnostic Agents    • Iodine    • Other      Vistra 650 Tabs   • Phenytoin    • Amoxicillin Rash   • Clindamycin/Lincomycin Rash       Current Outpatient Prescriptions:   •  escitalopram (LEXAPRO) 20 MG tablet, Take 1 tablet by mouth daily., Disp: , Rfl:   •  esomeprazole (NEXIUM 24HR) 20 MG capsule, Take 20 mg by mouth every morning before breakfast., Disp: , Rfl:   •  EVZIO 2 MG/0.4ML solution auto-injector, , Disp: , Rfl:   •  fluticasone (FLONASE) 50 MCG/ACT nasal spray, 1 spray into each nostril 2 (two) times a day. Administer 2 sprays in each nostril for each dose., Disp: 3 each, Rfl: 3  •  gabapentin (NEURONTIN) 600 MG tablet, Take 1 tablet by mouth 6 (six) times a day., Disp: , Rfl:   •  levothyroxine (SYNTHROID) 75 MCG tablet, Take 1 tablet by mouth Daily., Disp: 90 tablet, Rfl: 3  •  lisdexamfetamine (VYVANSE) 70 MG capsule, Take 1 capsule by mouth every morning., Disp: , Rfl:   •  methadone (DOLOPHINE) 5 MG tablet, Take 5 mg by mouth 2 (Two) Times a Day., Disp: , Rfl:   •  montelukast (SINGULAIR) 10 MG tablet, Take 1 tablet by mouth Every Night., Disp: 30 tablet, Rfl: 0  •  nortriptyline (PAMELOR) 75 MG capsule, Take 1 capsule by mouth daily., Disp: , Rfl:   •  ondansetron (ZOFRAN) 4 MG tablet, Take 4 mg by mouth every 6 (six) hours as needed for nausea or vomiting. Take for nausea and vomiting., Disp: , Rfl:   •  Sennosides (SENNA) 8.6 MG capsule, Take 1 capsule by mouth daily as needed., Disp: , Rfl:   •  sucralfate (CARAFATE) 1 GM/10ML suspension, Take 10 mL by mouth 4 (four) times a day as needed., Disp: , Rfl:   •  tamsulosin (FLOMAX) 0.4 MG capsule 24 hr capsule, Take 1 capsule by mouth daily., Disp: , Rfl:   •  TiZANidine (ZANAFLEX) 4 MG capsule, Take 1-2 capsules by mouth at night as needed., Disp: , Rfl:   •  Turmeric 450 MG capsule, Take 2 capsules by mouth., Disp: , Rfl:   •  Unable to find, Domperidone POWD; 1 tablet twice daily., Disp: , Rfl:         Review of  "Systems   Constitutional: Positive for fever.   HENT: Negative for facial swelling, nosebleeds, trouble swallowing and voice change.    Eyes: Negative for pain and redness.   Respiratory: Positive for shortness of breath and wheezing.    Cardiovascular: Positive for leg swelling. Negative for palpitations.   Gastrointestinal: Positive for abdominal pain, diarrhea and vomiting.   Endocrine: Positive for polydipsia. Negative for polyuria.   Genitourinary: Positive for decreased urine volume. Negative for frequency.   Musculoskeletal: Positive for neck pain. Negative for joint swelling.   Skin: Negative for rash.   Allergic/Immunologic: Negative for immunocompromised state.   Neurological: Negative for seizures and facial asymmetry.   Hematological: Bruises/bleeds easily.   Psychiatric/Behavioral: Positive for confusion. Negative for agitation.       Objective       Vitals:    08/24/17 0959   BP: 110/68   Pulse: 86   SpO2: 98%   Weight: 216 lb (98 kg)   Height: 74\" (188 cm)     Body mass index is 27.73 kg/(m^2).      Physical Exam   Constitutional: He is oriented to person, place, and time. He appears well-nourished.   Normal built   HENT:   Head: Normocephalic and atraumatic.   Eyes: Conjunctivae and EOM are normal.   Neck: Normal range of motion. Neck supple. Carotid bruit is not present. No thyromegaly present.   Thyroid palpable.    Cardiovascular: Normal rate and normal heart sounds.    HR - 86   Pulmonary/Chest: Effort normal and breath sounds normal. No stridor. No respiratory distress. He has no wheezes.   Abdominal: Soft. Bowel sounds are normal. He exhibits no distension. There is no tenderness.   Some central obesity   Musculoskeletal: He exhibits no edema or tenderness.   Lymphadenopathy:     He has no cervical adenopathy.   Neurological: He is alert and oriented to person, place, and time.   Intact pin prick and proprioception   Skin: Skin is warm and dry.   Psychiatric: He has a normal mood and affect. " His behavior is normal.   Vitals reviewed.    Results Review:     I reviewed the patient's new clinical results.    Medical records reviewed  Summary: done      Lab on 08/17/2017   Component Date Value Ref Range Status   • TSH 08/17/2017 2.610  0.270 - 4.200 mIU/mL Final   • Free T4 08/17/2017 1.34  0.93 - 1.70 ng/dL Final     Lab Results   Component Value Date    HGBA1C 5.70 (H) 04/11/2017     Lab Results   Component Value Date    CREATININE 1.59 (H) 08/14/2017     Imaging Results (most recent)     None                Assessment and Plan:    Joel was seen today for hypothyroidism.    Diagnoses and all orders for this visit:    Other specified hypothyroidism  -     Iron and TIBC; Future  -     Ferritin; Future  -     FSH & LH; Future  -     TestT+TestF+SHBG; Future  -     PSA; Future  -     TSH; Future  -     T4, Free; Future  -     Hemoglobin A1c; Future  -     Vitamin D 25 Hydroxy; Future  -     Vitamin B12 & Folate; Future  -     Lipid Panel; Future  -     Basic Metabolic Panel; Future  -     TestT+TestF+SHBG; Future    Pre-diabetes  -     Iron and TIBC; Future  -     Ferritin; Future  -     FSH & LH; Future  -     TestT+TestF+SHBG; Future  -     PSA; Future  -     TSH; Future  -     T4, Free; Future  -     Hemoglobin A1c; Future  -     Vitamin D 25 Hydroxy; Future  -     Vitamin B12 & Folate; Future  -     Lipid Panel; Future  -     Basic Metabolic Panel; Future  -     TestT+TestF+SHBG; Future    Chronic fatigue  -     Iron and TIBC; Future  -     Ferritin; Future  -     FSH & LH; Future  -     TestT+TestF+SHBG; Future  -     PSA; Future  -     TSH; Future  -     T4, Free; Future  -     Hemoglobin A1c; Future  -     Vitamin D 25 Hydroxy; Future  -     Vitamin B12 & Folate; Future  -     Lipid Panel; Future  -     Basic Metabolic Panel; Future  -     TestT+TestF+SHBG; Future    Hypogonadotropic hypogonadism  -     Iron and TIBC; Future  -     Ferritin; Future  -     FSH & LH; Future  -     TestT+TestF+SHBG;  Future  -     PSA; Future  -     TSH; Future  -     T4, Free; Future  -     Hemoglobin A1c; Future  -     Vitamin D 25 Hydroxy; Future  -     Vitamin B12 & Folate; Future  -     Lipid Panel; Future  -     Basic Metabolic Panel; Future  -     TestT+TestF+SHBG; Future    Other orders  -     levothyroxine (SYNTHROID) 75 MCG tablet; Take 1 tablet by mouth Daily.    Hypothyroidism   Continue levothyroxine 75 mcg oral daily.   Take the medication on empty stomach.     Chronic fatigue   Could be related to patients other medical comorbidities that the patient is dealing with.  Patient reports that his testosterone replacement might improve his energy levels.  Which could be a possibility but at the same time given his history of blood clots it would definitely increase the risk along with the other comorbidities like cardiovascular events and the risk of prostate cancer.  Patient has been educated on the side effects of the testosterone.  Will repeat patient's testosterone panel between 8-9 AM and based on the results will let patient know about the testosterone replacement.  Since the patient is with his grandchildren most of the time testosterone patch/cream might not be an option.    Hypogonadism  Will check his iron panel to rule out hemochromatosis.  Will check FSH and LH levels.    Borderline diabetes  Continue the diet and exercise as he has been following.    Patient has been noted to have a mildly elevated creatinine levels and is being followed by a nephrologist.    13 minutes out of 25 minutes face to face spent in counseling the patient extensively on hypothyroidism, testosterone replacement and its side effects

## 2017-08-24 NOTE — TELEPHONE ENCOUNTER
Patient notified.  Patient said that he has been in touch with Dr. Alcala's office and they are trying to get him in sooner than the first week of September.

## 2017-08-28 ENCOUNTER — LAB (OUTPATIENT)
Dept: ENDOCRINOLOGY | Age: 57
End: 2017-08-28

## 2017-08-28 ENCOUNTER — RESULTS ENCOUNTER (OUTPATIENT)
Dept: ENDOCRINOLOGY | Age: 57
End: 2017-08-28

## 2017-08-28 DIAGNOSIS — R53.82 CHRONIC FATIGUE: ICD-10-CM

## 2017-08-28 DIAGNOSIS — E23.0 HYPOGONADOTROPIC HYPOGONADISM (HCC): ICD-10-CM

## 2017-08-28 DIAGNOSIS — E03.8 OTHER SPECIFIED HYPOTHYROIDISM: ICD-10-CM

## 2017-08-28 DIAGNOSIS — R73.03 PRE-DIABETES: ICD-10-CM

## 2017-08-28 LAB
25(OH)D3+25(OH)D2 SERPL-MCNC: 35.8 NG/ML (ref 30–100)
BUN SERPL-MCNC: 16 MG/DL (ref 6–20)
BUN/CREAT SERPL: 11.3 (ref 7–25)
CALCIUM SERPL-MCNC: 9.1 MG/DL (ref 8.6–10.5)
CHLORIDE SERPL-SCNC: 102 MMOL/L (ref 98–107)
CHOLEST SERPL-MCNC: 162 MG/DL (ref 0–200)
CO2 SERPL-SCNC: 21.8 MMOL/L (ref 22–29)
CREAT SERPL-MCNC: 1.41 MG/DL (ref 0.76–1.27)
FOLATE SERPL-MCNC: 16.53 NG/ML (ref 4.78–24.2)
GLUCOSE SERPL-MCNC: 108 MG/DL (ref 65–99)
HBA1C MFR BLD: 5.85 % (ref 4.8–5.6)
HDLC SERPL-MCNC: 43 MG/DL (ref 40–60)
LDLC SERPL CALC-MCNC: 89 MG/DL (ref 0–100)
POTASSIUM SERPL-SCNC: 4 MMOL/L (ref 3.5–5.2)
SODIUM SERPL-SCNC: 141 MMOL/L (ref 136–145)
T4 FREE SERPL-MCNC: 1.02 NG/DL (ref 0.93–1.7)
TRIGL SERPL-MCNC: 151 MG/DL (ref 0–150)
TSH SERPL DL<=0.005 MIU/L-ACNC: 3.04 MIU/ML (ref 0.27–4.2)
VIT B12 SERPL-MCNC: 625 PG/ML (ref 211–946)
VLDLC SERPL CALC-MCNC: 30.2 MG/DL (ref 5–40)

## 2017-08-29 LAB
SHBG SERPL-SCNC: 75.1 NMOL/L (ref 19.3–76.4)
TESTOST FREE SERPL-MCNC: 2.4 PG/ML (ref 7.2–24)
TESTOST SERPL-MCNC: 439 NG/DL (ref 264–916)

## 2017-09-25 ENCOUNTER — TRANSCRIBE ORDERS (OUTPATIENT)
Dept: ADMINISTRATIVE | Facility: HOSPITAL | Age: 57
End: 2017-09-25

## 2017-09-25 DIAGNOSIS — F33.0 MAJOR DEPRESSIVE DISORDER, RECURRENT EPISODE, MILD (HCC): Primary | ICD-10-CM

## 2017-09-27 ENCOUNTER — LAB (OUTPATIENT)
Dept: LAB | Facility: HOSPITAL | Age: 57
End: 2017-09-27

## 2017-09-27 DIAGNOSIS — F33.0 MAJOR DEPRESSIVE DISORDER, RECURRENT EPISODE, MILD (HCC): ICD-10-CM

## 2017-09-27 PROCEDURE — 36415 COLL VENOUS BLD VENIPUNCTURE: CPT

## 2017-09-27 PROCEDURE — G0480 DRUG TEST DEF 1-7 CLASSES: HCPCS | Performed by: PSYCHIATRY & NEUROLOGY

## 2017-09-30 LAB — NORTRIP SERPL-MCNC: 159 NG/ML (ref 50–150)

## 2017-10-19 ENCOUNTER — TELEPHONE (OUTPATIENT)
Dept: INTERNAL MEDICINE | Facility: CLINIC | Age: 57
End: 2017-10-19
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
## 2017-10-19 NOTE — TELEPHONE ENCOUNTER
Patient called stating that he was in the ER about a month ago and was tested for a tick bite.  He was notified to be retested in 1 month.  He wanted to know if this can be done as a lab appointment or does he need to be seen by Dr. Hoover.  Please advise.

## 2017-10-20 NOTE — TELEPHONE ENCOUNTER
LMA - patient notified that an order was given and he can call the office to schedule a lab appointment.

## 2017-10-25 ENCOUNTER — RESULTS ENCOUNTER (OUTPATIENT)
Dept: INTERNAL MEDICINE | Facility: CLINIC | Age: 57
End: 2017-10-25
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
## 2017-11-09 LAB
B BURGDOR IGG PATRN SER IB-IMP: NEGATIVE
B BURGDOR IGM PATRN SER IB-IMP: NEGATIVE
B BURGDOR18KD IGG SER QL IB: ABNORMAL
B BURGDOR23KD IGG SER QL IB: ABNORMAL
B BURGDOR23KD IGM SER QL IB: ABNORMAL
B BURGDOR28KD IGG SER QL IB: ABNORMAL
B BURGDOR30KD IGG SER QL IB: ABNORMAL
B BURGDOR39KD IGG SER QL IB: ABNORMAL
B BURGDOR39KD IGM SER QL IB: ABNORMAL
B BURGDOR41KD IGG SER QL IB: PRESENT
B BURGDOR41KD IGM SER QL IB: PRESENT
B BURGDOR45KD IGG SER QL IB: ABNORMAL
B BURGDOR58KD IGG SER QL IB: PRESENT
B BURGDOR66KD IGG SER QL IB: ABNORMAL
B BURGDOR93KD IGG SER QL IB: ABNORMAL

## 2017-11-10 ENCOUNTER — TELEPHONE (OUTPATIENT)
Dept: INTERNAL MEDICINE | Facility: CLINIC | Age: 57
End: 2017-11-10

## 2017-11-10 NOTE — TELEPHONE ENCOUNTER
----- Message from Cesar Hoover MD sent at 11/10/2017  8:28 AM EST -----  Negative for Lyme disease

## 2017-11-17 LAB
FERRITIN SERPL-MCNC: 40.34 NG/ML (ref 30–400)
FSH SERPL-ACNC: 9.3 MIU/ML (ref 1.5–12.4)
IRON SATN MFR SERPL: 23 % (ref 20–50)
IRON SERPL-MCNC: 91 MCG/DL (ref 59–158)
LH SERPL-ACNC: 8.6 MIU/ML (ref 1.7–8.6)
PSA SERPL-MCNC: 0.56 NG/ML (ref 0–4)
SHBG SERPL-SCNC: 70.3 NMOL/L (ref 19.3–76.4)
TESTOST FREE SERPL-MCNC: 3.1 PG/ML (ref 7.2–24)
TESTOST SERPL-MCNC: 305 NG/DL (ref 264–916)
TIBC SERPL-MCNC: 396 MCG/DL
UIBC SERPL-MCNC: 305 MCG/DL

## 2017-11-28 ENCOUNTER — OFFICE VISIT (OUTPATIENT)
Dept: ENDOCRINOLOGY | Age: 57
End: 2017-11-28

## 2017-11-28 VITALS
HEART RATE: 87 BPM | OXYGEN SATURATION: 95 % | BODY MASS INDEX: 28.49 KG/M2 | HEIGHT: 74 IN | SYSTOLIC BLOOD PRESSURE: 130 MMHG | WEIGHT: 222 LBS | DIASTOLIC BLOOD PRESSURE: 70 MMHG

## 2017-11-28 DIAGNOSIS — E78.49 OTHER HYPERLIPIDEMIA: ICD-10-CM

## 2017-11-28 DIAGNOSIS — E03.8 OTHER SPECIFIED HYPOTHYROIDISM: Primary | ICD-10-CM

## 2017-11-28 DIAGNOSIS — R73.03 PRE-DIABETES: ICD-10-CM

## 2017-11-28 PROCEDURE — 99214 OFFICE O/P EST MOD 30 MIN: CPT | Performed by: INTERNAL MEDICINE

## 2017-11-28 RX ORDER — CYCLOBENZAPRINE HCL 5 MG
TABLET ORAL
COMMUNITY
Start: 2017-10-28 | End: 2018-06-06

## 2017-11-28 NOTE — PROGRESS NOTES
57 y.o.    Patient Care Team:  Cesar Hoover MD as PCP - General  Cesar Hoover MD as PCP - Family Medicine    Chief Complaint:    3 Month Follow / Hypothyroidism  Subjective     HPI    57-year-old white male is here as a follow up for the management of hypothyroidism, hypogonadism.     Hypothyroidism  Currently on synthroid 75 mcg oral daily. Compliant with his medication.   Reports poor energy levels thinks it is secondary to his fibromyalgia, weight has been stable, still complains of constipation but the medications-stool softners helping him, improved hair growth, occasional night sweats.  Complains of cold intolerance at times.  No complaints of tremors, no racing of heart, does have orthostatic hypotension.  No family history of thyroid disease.    Hypogonadism  Due to his complains of fatigue his testosterone levels have been checked by the primary care physician. Been checked at 10 AM and they have been noted to be abnormal.  Patient does have history of DVT.     Lukmupkcaixmos-PFR-597 mg/dL.      Borderline diabetes  Patient went for diabetic education and has been educated on diet changes and exercise regimen.     Fibromyalgia  Being followed by pain specialist    Interval History      The following portions of the patient's history were reviewed and updated as appropriate: allergies, current medications, past family history, past medical history, past social history, past surgical history and problem list.    Past Medical History:   Diagnosis Date   • Abdominal pain     Recurrent sx and follows with Dr. Heard.   • Abscess of buttock    • Acute gastritis    • Allergic rhinitis    • Alopecia    • Angina pectoris    • Anxiety    • Arteriosclerotic coronary artery disease    • Benign prostatic hypertrophy (BPH) with nocturia    • Calf tenderness    • Cervical neck pain with evidence of disc disease    • DDD (degenerative disc disease), lumbar    • Degeneration of cervical intervertebral disc    •  Depression    • Esophageal reflux     Follows with Dr. Jackson   • Fatigue    • Fever 04/2015    Low grade symptoms of fever for 3 weeks.   • Fibromyalgia    • Gastroparesis    • Health care maintenance    • Heart palpitations    • History of deep vein thrombophlebitis of lower extremity 2003    DVT Dx 2003   • Hypokalemia    • Idiopathic peripheral neuropathy    • Impaired fasting glucose    • Insomnia    • Lichen planus     tongue   • LOM (loss of memory)    • Mental status change     Episodes where he stares ahead and have some shaking of his limbs.  Possible seizure activity.  Referred to Dr. Santana, October 2, 2013.   • Nausea    • Onychomycosis of toenail    • Osteoarthritis of hip    • PN (peripheral neuropathy)    • Primary hypothyroidism    • Rash    • Sleep apnea    • Tendonitis of left hip    • Tendonitis of right hip    • TIA (transient ischemic attack) 06/2016   • Tinea versicolor      Family History   Problem Relation Age of Onset   • Heart failure Mother      Congestive   • Heart disease Mother    • Atrial fibrillation Mother    • Alcohol abuse Paternal Uncle    • Heart failure Paternal Grandmother      Congestive   • No Known Problems Father    • No Known Problems Sister    • No Known Problems Brother    • Heart failure Maternal Grandmother    • Heart disease Maternal Grandmother    • No Known Problems Maternal Grandfather    • No Known Problems Paternal Grandfather      Social History     Social History   • Marital status:      Spouse name: N/A   • Number of children: 2   • Years of education: N/A     Occupational History   • Retired      Social History Main Topics   • Smoking status: Never Smoker   • Smokeless tobacco: Former User     Types: Chew   • Alcohol use No   • Drug use: No   • Sexual activity: Defer     Other Topics Concern   • Not on file     Social History Narrative     Allergies   Allergen Reactions   • Butorphanol    • Hydroxyzine    • Iodinated Diagnostic Agents    • Iodine     • Other      Vistra 650 Tabs   • Phenytoin    • Amoxicillin Rash   • Clindamycin/Lincomycin Rash       Current Outpatient Prescriptions:   •  cyclobenzaprine (FLEXERIL) 5 MG tablet, , Disp: , Rfl:   •  escitalopram (LEXAPRO) 20 MG tablet, Take 1 tablet by mouth daily., Disp: , Rfl:   •  esomeprazole (NEXIUM 24HR) 20 MG capsule, Take 20 mg by mouth every morning before breakfast., Disp: , Rfl:   •  fluticasone (FLONASE) 50 MCG/ACT nasal spray, 1 spray into each nostril 2 (two) times a day. Administer 2 sprays in each nostril for each dose., Disp: 3 each, Rfl: 3  •  gabapentin (NEURONTIN) 600 MG tablet, Take 1 tablet by mouth 6 (six) times a day., Disp: , Rfl:   •  levothyroxine (SYNTHROID) 75 MCG tablet, Take 1 tablet by mouth Daily., Disp: 90 tablet, Rfl: 3  •  lisdexamfetamine (VYVANSE) 70 MG capsule, Take 1 capsule by mouth every morning., Disp: , Rfl:   •  methadone (DOLOPHINE) 5 MG tablet, Take 5 mg by mouth Daily., Disp: , Rfl:   •  nortriptyline (PAMELOR) 75 MG capsule, Take 1 capsule by mouth daily., Disp: , Rfl:   •  ondansetron (ZOFRAN) 4 MG tablet, Take 4 mg by mouth every 6 (six) hours as needed for nausea or vomiting. Take for nausea and vomiting., Disp: , Rfl:   •  Sennosides (SENNA) 8.6 MG capsule, Take 1 capsule by mouth daily as needed., Disp: , Rfl:   •  sucralfate (CARAFATE) 1 GM/10ML suspension, Take 10 mL by mouth 4 (four) times a day as needed., Disp: , Rfl:   •  tamsulosin (FLOMAX) 0.4 MG capsule 24 hr capsule, Take 1 capsule by mouth daily., Disp: , Rfl:   •  Turmeric 450 MG capsule, Take 2 capsules by mouth., Disp: , Rfl:   •  Unable to find, Domperidone POWD; 1 tablet twice daily., Disp: , Rfl:   •  EVZIO 2 MG/0.4ML solution auto-injector, , Disp: , Rfl:         Review of Systems   Constitutional: Positive for fatigue. Negative for fever.   HENT: Positive for trouble swallowing and voice change. Negative for facial swelling and nosebleeds.    Eyes: Negative for pain and redness.  "  Respiratory: Positive for shortness of breath. Negative for wheezing.    Cardiovascular: Positive for palpitations. Negative for leg swelling.   Gastrointestinal: Negative for abdominal pain, diarrhea and vomiting.   Endocrine: Negative for polydipsia and polyuria.   Genitourinary: Negative for decreased urine volume and frequency.   Musculoskeletal: Negative for joint swelling and neck pain.   Skin: Negative for rash.   Allergic/Immunologic: Negative for immunocompromised state.   Neurological: Positive for dizziness and light-headedness. Negative for seizures and facial asymmetry.   Hematological: Does not bruise/bleed easily.   Psychiatric/Behavioral: Positive for confusion. Negative for agitation.       Objective       Vitals:    11/28/17 0846   BP: 130/70   Pulse: 87   SpO2: 95%   Weight: 222 lb (101 kg)   Height: 74\" (188 cm)     Body mass index is 28.5 kg/(m^2).      Physical Exam   Gen exam - alert and oriented x 3, obese male, not in distress.   HEENT - No acanthosis nigricans. Thyroid palpable.   Resp - Clear to auscultation.   CVS - S1,S2 heard and no murmurs.   Abd - Non tender, BS heard. Central obesity  Ext - No edema and intact pin prick and proprioception.     Results Review:     I reviewed the patient's new clinical results.    Medical records reviewed  Summary: done      Results Encounter on 10/25/2017   Component Date Value Ref Range Status   • IgG P93 Ab. 11/07/2017 Absent   Final   • IgG P66 Ab. 11/07/2017 Absent   Final   • IgG P58 Ab. 11/07/2017 Present*  Final   • IgG P45 Ab. 11/07/2017 Absent   Final   • IgG P41 Ab. 11/07/2017 Present*  Final   • IgG P39 Ab. 11/07/2017 Absent   Final   • IgG P30 Ab. 11/07/2017 Absent   Final   • IgG P28 Ab. 11/07/2017 Absent   Final   • IgG P23 Ab. 11/07/2017 Absent   Final   • IgG P18 Ab. 11/07/2017 Absent   Final   • Lyme IgG Western Blot Interpretati* 11/07/2017 Negative   Final    Comment:                      Positive: 5 of the following               "                   Borrelia-specific bands:                                 18,23,28,30,39,41,45,58,                                 66, and 93.                       Negative: No bands or banding                                 patterns which do not                                 meet positive criteria.     • IgM P41 Ab. 11/07/2017 Present*  Final   • IgM P39 Ab. 11/07/2017 Absent   Final   • IgM P23 Ab. 11/07/2017 Absent   Final   • Lyme IgM Western Blot Interpretati* 11/07/2017 Negative   Final    Comment: Note: An equivocal or positive EIA result followed by a negative  Western Blot result is considered NEGATIVE. An equivocal or positive  EIA result followed by a positive Western Blot is considered POSITIVE  by the CDC.  Positive: 2 of the following bands: 23,39 or 41  Negative: No bands or banding patterns which do not meet positive  criteria.  Criteria for positivity are those recommended by CDC/ASTPHLD.  p23=Osp C, r63=drkwbhxph  Note:  Sera from individuals with the following may cross react in the  Lyme Western Blot assays: other spirochetal diseases (periodontal  disease, leptospirosis, relapsing fever, yaws, and pinta);  connective autoimmune (Rheumatoid Arthritis and Systemic Lupus  Erythematosus and also individuals with Antinuclear Antibody);  other infections ([place] Spotted Fever; Elizabeth-Barr Virus,  and Cytomegalovirus).       Lab Results   Component Value Date    HGBA1C 5.85 (H) 08/28/2017    HGBA1C 5.70 (H) 04/11/2017     Lab Results   Component Value Date    CREATININE 1.41 (H) 08/28/2017     Imaging Results (most recent)     None                Assessment and Plan:    Joel was seen today for hypothyroidism.    Diagnoses and all orders for this visit:    Other specified hypothyroidism  -     TSH; Future  -     T4, Free; Future  -     Vitamin D 25 Hydroxy; Future  -     Vitamin B12 & Folate; Future  -     Lipid Panel; Future  -     Hemoglobin A1c; Future    Pre-diabetes  -     TSH;  Future  -     T4, Free; Future  -     Vitamin D 25 Hydroxy; Future  -     Vitamin B12 & Folate; Future  -     Lipid Panel; Future  -     Hemoglobin A1c; Future    Other hyperlipidemia  -     TSH; Future  -     T4, Free; Future  -     Vitamin D 25 Hydroxy; Future  -     Vitamin B12 & Folate; Future  -     Lipid Panel; Future  -     Hemoglobin A1c; Future    Hypothyroidism  Continue levothyroxine 75 µg oral daily  Advised the patient to take the medication on empty stomach.    Prediabetes  At this time watching his blood sugars with diet control and exercise.    Hyperlipidemia  Due to his history of fibromyalgia will defer starting him on cholesterol-lowering medications for now.    History of fibromyalgia  Followed by pain specialist.    Extreme fatigue  Reviewed patient's vitamin profile, HbA1c which have been within normal limits.  Counseled the patient taking multivitamin 1 tablet a day or melatonin which can improve his sleep might be of some benefit.  If he continues to have poor sleep he should consider a sleep study.     14 minutes out of 25 minutes face to face spent in counseling the patient extensively on concerns for fatigue and treatment plan

## 2018-04-16 ENCOUNTER — LAB (OUTPATIENT)
Dept: ENDOCRINOLOGY | Age: 58
End: 2018-04-16

## 2018-04-16 DIAGNOSIS — E78.49 OTHER HYPERLIPIDEMIA: ICD-10-CM

## 2018-04-16 DIAGNOSIS — E03.8 OTHER SPECIFIED HYPOTHYROIDISM: ICD-10-CM

## 2018-04-16 DIAGNOSIS — R73.03 PRE-DIABETES: ICD-10-CM

## 2018-04-16 LAB
25(OH)D3+25(OH)D2 SERPL-MCNC: 42.5 NG/ML (ref 30–100)
CHOLEST SERPL-MCNC: 172 MG/DL (ref 0–200)
FOLATE SERPL-MCNC: >20 NG/ML (ref 4.78–24.2)
HBA1C MFR BLD: 5.75 % (ref 4.8–5.6)
HDLC SERPL-MCNC: 41 MG/DL (ref 40–60)
INTERPRETATION: NORMAL
LDLC SERPL CALC-MCNC: 89 MG/DL (ref 0–100)
Lab: NORMAL
T4 FREE SERPL-MCNC: 1.14 NG/DL (ref 0.93–1.7)
TRIGL SERPL-MCNC: 210 MG/DL (ref 0–150)
TSH SERPL DL<=0.005 MIU/L-ACNC: 6.39 MIU/ML (ref 0.27–4.2)
VIT B12 SERPL-MCNC: 727 PG/ML (ref 211–946)
VLDLC SERPL CALC-MCNC: 42 MG/DL (ref 5–40)

## 2018-06-06 ENCOUNTER — OFFICE VISIT (OUTPATIENT)
Dept: ENDOCRINOLOGY | Age: 58
End: 2018-06-06

## 2018-06-06 VITALS
SYSTOLIC BLOOD PRESSURE: 128 MMHG | HEIGHT: 74 IN | WEIGHT: 220.6 LBS | HEART RATE: 94 BPM | BODY MASS INDEX: 28.31 KG/M2 | OXYGEN SATURATION: 91 % | DIASTOLIC BLOOD PRESSURE: 78 MMHG

## 2018-06-06 DIAGNOSIS — E03.9 ACQUIRED HYPOTHYROIDISM: Primary | ICD-10-CM

## 2018-06-06 DIAGNOSIS — R73.03 PRE-DIABETES: ICD-10-CM

## 2018-06-06 DIAGNOSIS — E78.2 MIXED HYPERLIPIDEMIA: ICD-10-CM

## 2018-06-06 PROCEDURE — 99214 OFFICE O/P EST MOD 30 MIN: CPT | Performed by: INTERNAL MEDICINE

## 2018-06-06 RX ORDER — LEVOTHYROXINE SODIUM 88 UG/1
88 TABLET ORAL DAILY
Qty: 90 TABLET | Refills: 3 | Status: SHIPPED | OUTPATIENT
Start: 2018-06-06 | End: 2018-10-10

## 2018-06-06 NOTE — PROGRESS NOTES
57 y.o.    Patient Care Team:  Cesar Hoover MD as PCP - General  eCsar Hoover MD as PCP - Family Medicine    Chief Complaint:    5 month follow up Hypothyroidism  Subjective     HPI    57-year-old white male is here as a follow up for the management of hypothyroidism, hypogonadism.     Hypothyroidism  Today in the clinic he reports he is on levothyroxine 75 mcg oral daily. Takes the medication every single day on empty stomach.   He reports poor energy levels, weight has been stable, fibromyalgia is worse with the stress. C/o constipation - stool softeners helping, c/o night sweats,   Sleep is ok. C/o cold intolerance at times.   No tremors, occasional racing of heart, no eye symptoms. Does have hx of orthostatic hypotension. Occasional dizziness and black outs.   No family history of thyroid disease.    Hyperlipidemia- not on any medications, patient is extremely concerned to be started on statins due to his history of fibromyalgia and statins could result in a side effect of myalgias.      Borderline diabetes  Patient went for diabetic education and has been educated on diet changes and exercise regimen.      Fibromyalgia  Being followed by pain specialist    Reviewed primary care physician's/consulting physician documentation and lab results :     Interval History      The following portions of the patient's history were reviewed and updated as appropriate: allergies, current medications, past family history, past medical history, past social history, past surgical history and problem list.    Past Medical History:   Diagnosis Date   • Abdominal pain     Recurrent sx and follows with Dr. Heard.   • Abscess of buttock    • Acute gastritis    • Allergic rhinitis    • Alopecia    • Angina pectoris    • Anxiety    • Arteriosclerotic coronary artery disease    • Benign prostatic hypertrophy (BPH) with nocturia    • Calf tenderness    • Cervical neck pain with evidence of disc disease    • DDD (degenerative  disc disease), lumbar    • Degeneration of cervical intervertebral disc    • Depression    • Esophageal reflux     Follows with Dr. Jackson   • Fatigue    • Fever 04/2015    Low grade symptoms of fever for 3 weeks.   • Fibromyalgia    • Gastroparesis    • Health care maintenance    • Heart palpitations    • History of deep vein thrombophlebitis of lower extremity 2003    DVT Dx 2003   • Hypokalemia    • Idiopathic peripheral neuropathy    • Impaired fasting glucose    • Insomnia    • Lichen planus     tongue   • LOM (loss of memory)    • Mental status change     Episodes where he stares ahead and have some shaking of his limbs.  Possible seizure activity.  Referred to Dr. Santana, October 2, 2013.   • Nausea    • Onychomycosis of toenail    • Osteoarthritis of hip    • PN (peripheral neuropathy)    • Primary hypothyroidism    • Rash    • Sleep apnea    • Tendonitis of left hip    • Tendonitis of right hip    • TIA (transient ischemic attack) 06/2016   • Tinea versicolor      Family History   Problem Relation Age of Onset   • Heart failure Mother         Congestive   • Heart disease Mother    • Atrial fibrillation Mother    • Alcohol abuse Paternal Uncle    • Heart failure Paternal Grandmother         Congestive   • No Known Problems Father    • No Known Problems Sister    • No Known Problems Brother    • Heart failure Maternal Grandmother    • Heart disease Maternal Grandmother    • No Known Problems Maternal Grandfather    • No Known Problems Paternal Grandfather      Social History     Social History   • Marital status:      Spouse name: N/A   • Number of children: 2   • Years of education: N/A     Occupational History   • Retired      Social History Main Topics   • Smoking status: Never Smoker   • Smokeless tobacco: Former User     Types: Chew   • Alcohol use No   • Drug use: No   • Sexual activity: Defer     Other Topics Concern   • Not on file     Social History Narrative   • No narrative on file      Allergies   Allergen Reactions   • Butorphanol    • Hydroxyzine    • Iodinated Diagnostic Agents    • Iodine    • Other      Vistra 650 Tabs   • Phenytoin    • Amoxicillin Rash   • Clindamycin/Lincomycin Rash       Current Outpatient Prescriptions:   •  escitalopram (LEXAPRO) 20 MG tablet, Take 1 tablet by mouth daily., Disp: , Rfl:   •  EVZIO 2 MG/0.4ML solution auto-injector, , Disp: , Rfl:   •  fluticasone (FLONASE) 50 MCG/ACT nasal spray, 1 spray into each nostril 2 (two) times a day. Administer 2 sprays in each nostril for each dose., Disp: 3 each, Rfl: 3  •  levothyroxine (SYNTHROID, LEVOTHROID) 88 MCG tablet, Take 1 tablet by mouth Daily., Disp: 90 tablet, Rfl: 3  •  lisdexamfetamine (VYVANSE) 70 MG capsule, Take 1 capsule by mouth every morning., Disp: , Rfl:   •  methadone (DOLOPHINE) 5 MG tablet, Take 5 mg by mouth Daily., Disp: , Rfl:   •  nortriptyline (PAMELOR) 75 MG capsule, Take 1 capsule by mouth daily., Disp: , Rfl:   •  ondansetron (ZOFRAN) 4 MG tablet, Take 4 mg by mouth every 6 (six) hours as needed for nausea or vomiting. Take for nausea and vomiting., Disp: , Rfl:   •  Sennosides (SENNA) 8.6 MG capsule, Take 1 capsule by mouth daily as needed., Disp: , Rfl:   •  sucralfate (CARAFATE) 1 GM/10ML suspension, Take 10 mL by mouth 4 (four) times a day as needed., Disp: , Rfl:   •  tamsulosin (FLOMAX) 0.4 MG capsule 24 hr capsule, Take 1 capsule by mouth daily., Disp: , Rfl:   •  Turmeric 450 MG capsule, Take 2 capsules by mouth., Disp: , Rfl:   •  Unable to find, Domperidone POWD; 1 tablet twice daily., Disp: , Rfl:   •  gabapentin (NEURONTIN) 600 MG tablet, Take 1 tablet by mouth 6 (six) times a day., Disp: , Rfl:         Review of Systems   Constitutional: Positive for appetite change and fatigue. Negative for fever.   Eyes: Positive for visual disturbance.   Respiratory: Positive for shortness of breath.    Cardiovascular: Positive for leg swelling. Negative for palpitations.  "  Gastrointestinal: Positive for abdominal pain, constipation, nausea and vomiting.   Endocrine: Negative for polydipsia and polyuria.   Musculoskeletal: Positive for neck pain.   Skin: Negative for rash.   Neurological: Positive for weakness and numbness.   Psychiatric/Behavioral: Negative for behavioral problems.       Objective       Vitals:    06/06/18 1039   BP: 128/78   Pulse: 94   SpO2: 91%   Weight: 100 kg (220 lb 9.6 oz)   Height: 188 cm (74\")     Body mass index is 28.32 kg/m².      Physical Exam   Constitutional: He is oriented to person, place, and time. He appears well-nourished.   HENT:   Head: Normocephalic and atraumatic.   Eyes: Conjunctivae and EOM are normal. No scleral icterus.   Neck: No thyromegaly present.   Cardiovascular: Normal rate and regular rhythm.    Pulmonary/Chest: Effort normal and breath sounds normal. No stridor. No respiratory distress. He has no wheezes.   Abdominal: Soft. Bowel sounds are normal. He exhibits no distension. There is no tenderness.   Musculoskeletal: He exhibits no edema or deformity.   Lymphadenopathy:     He has no cervical adenopathy.   Neurological: He is alert and oriented to person, place, and time.   Skin: Skin is warm and dry. No rash noted. He is not diaphoretic.   Psychiatric: He has a normal mood and affect.   Vitals reviewed.    Results Review:     I reviewed the patient's new clinical results and mentioned them above in HPI and in plan as well.    Medical records reviewed  Summary: done      Lab on 04/16/2018   Component Date Value Ref Range Status   • TSH 04/16/2018 6.390* 0.270 - 4.200 mIU/mL Final   • Free T4 04/16/2018 1.14  0.93 - 1.70 ng/dL Final   • 25 Hydroxy, Vitamin D 04/16/2018 42.5  30.0 - 100.0 ng/ml Final    Comment: Reference Range for Total Vitamin D 25(OH)  Deficiency    <20.0 ng/mL  Insufficiency 21-29 ng/mL  Sufficiency    ng/mL  Toxicity      >100 ng/ml        • Vitamin B-12 04/16/2018 727  211 - 946 pg/mL Final   • Folate " 04/16/2018 >20.00  4.78 - 24.20 ng/mL Final   • Total Cholesterol 04/16/2018 172  0 - 200 mg/dL Final   • Triglycerides 04/16/2018 210* 0 - 150 mg/dL Final   • HDL Cholesterol 04/16/2018 41  40 - 60 mg/dL Final   • VLDL Cholesterol 04/16/2018 42* 5 - 40 mg/dL Final   • LDL Cholesterol  04/16/2018 89  0 - 100 mg/dL Final   • Hemoglobin A1C 04/16/2018 5.75* 4.80 - 5.60 % Final    Comment: Hemoglobin A1C Ranges:  Increased Risk for Diabetes  5.7% to 6.4%  Diabetes                     >= 6.5%  Diabetic Goal                < 7.0%     • Interpretation 04/16/2018 Note   Final    Supplemental report is available.   • PDF Image 04/16/2018 Not applicable   Final     Lab Results   Component Value Date    HGBA1C 5.75 (H) 04/16/2018    HGBA1C 5.85 (H) 08/28/2017    HGBA1C 5.70 (H) 04/11/2017     Lab Results   Component Value Date    CREATININE 1.41 (H) 08/28/2017     Imaging Results (most recent)     None                Assessment and Plan:    Joel was seen today for hypothyroidism.    Diagnoses and all orders for this visit:    Acquired hypothyroidism  -     TSH; Future  -     T4, Free; Future  -     Hemoglobin A1c; Future  -     Lipid Panel; Future  -     Vitamin B12 & Folate; Future  -     Vitamin D 25 Hydroxy; Future    Mixed hyperlipidemia  -     TSH; Future  -     T4, Free; Future  -     Hemoglobin A1c; Future  -     Lipid Panel; Future  -     Vitamin B12 & Folate; Future  -     Vitamin D 25 Hydroxy; Future    Pre-diabetes  -     TSH; Future  -     T4, Free; Future  -     Hemoglobin A1c; Future  -     Lipid Panel; Future  -     Vitamin B12 & Folate; Future  -     Vitamin D 25 Hydroxy; Future    Other orders  -     levothyroxine (SYNTHROID, LEVOTHROID) 88 MCG tablet; Take 1 tablet by mouth Daily.    Hypothyroidism  Increase the dosage of levothyroxine to 88 µg oral daily  TSH noted to be mildly abnormal.    Hyperlipidemia  Advised the patient to watch low fat intake.  Will hold off on starting any medications at this  "time.    Pre-diabetes  NfH5p-3.75%  Decently controlled, continue diet and exercise.      Reviewed Lab results with the patient.             Janelle Serna MD  06/06/18    EMR Dragon / transcription disclaimer:     \"Dictated utilizing Dragon dictation\".  "

## 2018-06-12 ENCOUNTER — OFFICE VISIT (OUTPATIENT)
Dept: INTERNAL MEDICINE | Facility: CLINIC | Age: 58
End: 2018-06-12

## 2018-06-12 VITALS
TEMPERATURE: 99 F | OXYGEN SATURATION: 96 % | WEIGHT: 220.9 LBS | HEIGHT: 74 IN | DIASTOLIC BLOOD PRESSURE: 74 MMHG | BODY MASS INDEX: 28.35 KG/M2 | HEART RATE: 88 BPM | SYSTOLIC BLOOD PRESSURE: 122 MMHG

## 2018-06-12 DIAGNOSIS — Z00.00 HEALTHCARE MAINTENANCE: Primary | ICD-10-CM

## 2018-06-12 PROCEDURE — 93000 ELECTROCARDIOGRAM COMPLETE: CPT | Performed by: FAMILY MEDICINE

## 2018-06-12 PROCEDURE — 99396 PREV VISIT EST AGE 40-64: CPT | Performed by: FAMILY MEDICINE

## 2018-06-12 RX ORDER — TIZANIDINE 4 MG/1
TABLET ORAL
COMMUNITY
Start: 2018-03-18 | End: 2018-06-12 | Stop reason: DRUGHIGH

## 2018-06-12 RX ORDER — CYCLOBENZAPRINE HCL 5 MG
5 TABLET ORAL
COMMUNITY
End: 2018-06-12

## 2018-06-12 RX ORDER — TRAMADOL HYDROCHLORIDE 50 MG/1
TABLET ORAL
COMMUNITY
Start: 2017-12-20 | End: 2018-06-12

## 2018-06-12 RX ORDER — NORTRIPTYLINE HYDROCHLORIDE 50 MG/1
50 CAPSULE ORAL NIGHTLY
COMMUNITY
Start: 2018-05-10 | End: 2022-09-20 | Stop reason: ALTCHOICE

## 2018-06-12 RX ORDER — TIZANIDINE 4 MG/1
4 TABLET ORAL 2 TIMES DAILY PRN
COMMUNITY
Start: 2018-03-18 | End: 2019-07-10

## 2018-06-12 RX ORDER — METHADONE HYDROCHLORIDE 5 MG/1
TABLET ORAL
COMMUNITY
Start: 2018-01-04 | End: 2018-06-12 | Stop reason: DRUGHIGH

## 2018-06-12 RX ORDER — LEVOTHYROXINE SODIUM 0.07 MG/1
TABLET ORAL
COMMUNITY
Start: 2018-04-21 | End: 2018-06-12 | Stop reason: DRUGHIGH

## 2018-06-12 NOTE — PROGRESS NOTES
Subjective   Joel Keating is a 57 y.o. male.     Chief Complaint   Patient presents with   • Annual Exam         History of Present Illness   Joel Keating 57 y.o. male who presents for an Annual Wellness Visit.  he has a history of   Patient Active Problem List   Diagnosis   • Atopic rhinitis   • Anxiety   • Chronic coronary artery disease   • Benign prostatic hyperplasia with urinary obstruction   • Tenderness of lower extremity   • Degeneration of intervertebral disc of cervical region   • Depression   • Degeneration of intervertebral disc of lumbar region   • Fatigue   • Fibromyalgia   • Gastroparesis   • Herpes simplex virus (HSV) infection   • Impaired fasting glucose   • Insomnia   • Memory loss   • Onychomycosis of toenail   • Osteoarthritis of hip   • Peripheral neuropathy   • Hypothyroidism   • Sleep apnea   • Tinea versicolor   • Annual physical exam   • Chronic pain syndrome   • Renal insufficiency   • Bronchitis   .  he has been feeling well.  Labs results discussed in detail with the patient.  Plan to update vaccines if needed today.  I  reviewed health maintenance with him as part of my preventative care plan.    Health Habits:  Dental Exam. up to date  Eye Exam. up to date  Exercise: 3 times/week.  Current exercise activities include: walking      The following portions of the patient's history were reviewed and updated as appropriate: allergies, current medications, past family history, past medical history, past social history, past surgical history and problem list.    Review of Systems   Eyes: Negative.    Respiratory: Negative.    Cardiovascular: Negative.    Gastrointestinal: Negative.    Endocrine: Negative.    Genitourinary: Negative.    Musculoskeletal: Positive for back pain, gait problem, myalgias and neck pain.   Skin: Negative.    Allergic/Immunologic: Negative.    Neurological: Positive for weakness and headaches.   Hematological: Negative.    Psychiatric/Behavioral: Positive for  dysphoric mood and sleep disturbance. The patient is nervous/anxious.        Objective   Physical Exam   Constitutional: He is oriented to person, place, and time. He appears well-developed and well-nourished. No distress.   HENT:   Head: Normocephalic and atraumatic.   Right Ear: External ear normal.   Left Ear: External ear normal.   Mouth/Throat: Oropharynx is clear and moist.   Eyes: Conjunctivae and EOM are normal. Pupils are equal, round, and reactive to light. Right eye exhibits no discharge. Left eye exhibits no discharge. No scleral icterus.   Neck: Normal range of motion. Neck supple. No tracheal deviation present. No thyromegaly present.   Cardiovascular: Normal rate, regular rhythm, normal heart sounds, intact distal pulses and normal pulses.  Exam reveals no gallop.    No murmur heard.  Pulmonary/Chest: Effort normal and breath sounds normal. No respiratory distress. He has no wheezes. He has no rales.   Musculoskeletal: Normal range of motion.   Neurological: He is alert and oriented to person, place, and time. He exhibits normal muscle tone. Coordination normal.   Skin: Skin is warm. No rash noted. No erythema. No pallor.   Psychiatric: He has a normal mood and affect. His behavior is normal. Judgment and thought content normal.   Nursing note and vitals reviewed.    Recent increase in nortriptyline dose  Assessment/Plan   Joel was seen today for annual exam.    Diagnoses and all orders for this visit:    Healthcare maintenance  -     Hepatitis C Antibody    Other orders  -     ECG 12 Lead      ECG 12 Lead  Date/Time: 6/12/2018 2:23 PM  Performed by: SHERRI PEREZ  Authorized by: SHERRI PEREZ   Comparison: compared with previous ECG from 7/26/2017  Comparison to previous ECG: No change  Rhythm: sinus rhythm  Rate: normal  ST Segments: ST segments normal  T Waves: T waves normal  QRS axis: left  Clinical impression: normal ECG        Follow-up results of blood work continue healthy lifestyle  with diet and exercise pain management and various specialists providing Comprehensive Care  Patient recommended shingles vaccine today up-date as well as colonoscopy.

## 2018-06-13 ENCOUNTER — TELEPHONE (OUTPATIENT)
Dept: INTERNAL MEDICINE | Facility: CLINIC | Age: 58
End: 2018-06-13

## 2018-06-13 LAB — HCV AB S/CO SERPL IA: <0.1 S/CO RATIO (ref 0–0.9)

## 2018-06-13 NOTE — TELEPHONE ENCOUNTER
----- Message from Cesar Hoover MD sent at 6/13/2018 12:30 PM EDT -----  No evidence of hepatitis C

## 2018-07-03 ENCOUNTER — TRANSCRIBE ORDERS (OUTPATIENT)
Dept: ADMINISTRATIVE | Facility: HOSPITAL | Age: 58
End: 2018-07-03

## 2018-07-03 ENCOUNTER — LAB (OUTPATIENT)
Dept: LAB | Facility: HOSPITAL | Age: 58
End: 2018-07-03

## 2018-07-03 DIAGNOSIS — K85.90 PAINLESS PANCREATITIS: ICD-10-CM

## 2018-07-03 DIAGNOSIS — R10.10 UPPER ABDOMINAL PAIN: Primary | ICD-10-CM

## 2018-07-03 DIAGNOSIS — R10.10 UPPER ABDOMINAL PAIN: ICD-10-CM

## 2018-07-03 LAB
AMYLASE SERPL-CCNC: 52 U/L (ref 28–100)
LIPASE SERPL-CCNC: 22 U/L (ref 13–60)

## 2018-07-03 PROCEDURE — 82150 ASSAY OF AMYLASE: CPT | Performed by: INTERNAL MEDICINE

## 2018-07-03 PROCEDURE — 83690 ASSAY OF LIPASE: CPT | Performed by: INTERNAL MEDICINE

## 2018-07-03 PROCEDURE — 36415 COLL VENOUS BLD VENIPUNCTURE: CPT

## 2018-07-27 ENCOUNTER — OFFICE VISIT (OUTPATIENT)
Dept: CARDIOLOGY | Facility: CLINIC | Age: 58
End: 2018-07-27

## 2018-07-27 VITALS
BODY MASS INDEX: 27.85 KG/M2 | HEIGHT: 74 IN | OXYGEN SATURATION: 96 % | DIASTOLIC BLOOD PRESSURE: 76 MMHG | WEIGHT: 217 LBS | HEART RATE: 96 BPM | SYSTOLIC BLOOD PRESSURE: 124 MMHG

## 2018-07-27 DIAGNOSIS — I25.10 CHRONIC CORONARY ARTERY DISEASE: Primary | ICD-10-CM

## 2018-07-27 PROCEDURE — 93000 ELECTROCARDIOGRAM COMPLETE: CPT | Performed by: PHYSICIAN ASSISTANT

## 2018-07-27 PROCEDURE — 99213 OFFICE O/P EST LOW 20 MIN: CPT | Performed by: PHYSICIAN ASSISTANT

## 2018-07-27 NOTE — PROGRESS NOTES
Date of Office Visit: 2018  Encounter Provider: EILAS Kearney  Place of Service: [facility] CARDIOLOGY  Patient Name: Joel Keating  :1960    Chief Complaint   Patient presents with   • Coronary Artery Disease     1 year follow up   :     HPI: Joel Keating is a 58 y.o. male who presents today for follow-up.  Old records have been obtained and reviewed by me.  He is a patient of Dr. Ventura's with a past cardiac history significant for mild coronary disease and a small myocardial bridge of the LAD.  He is also had orthostasis and syncope.  This has continued despite using compression hose as well as dietary changes.  I did place a Zio patch on him, and he did have syncopal episodes while he was wearing the patch.  These did not correlate to any ECG abnormalities.  His main issue remains of fibromyalgia and narcotic use.  He has been on methadone for chronic pain.  He was last in our office to see Dr. Ventura on 2017.  At that visit, he remarked that he was working with a naturopathic practitioner to help with his pain.  He was stable from a cardiac standpoint.  No changes were made to his medical regimen, and he see her today for yearly visit.   Over the past year he's been stable from a cardiac standpoint.  Unfortunately, his 33-year-old son committed suicide recently.  Obviously, this was very difficult for him.  [place]  His mother was also recently diagnosed with breast cancer.  Despite all of these very emotional situations, he has been fairly stable.  He has chest pain on occasion that is unchanged.  He still has near syncope if he stands up too quickly.  He has a little bit of shortness of breath with standing as well.  His fibromyalgia some days it is very bad, and other days it is not.  He is still seeing pain management, and methadone is really the only thing that helps.      Past Medical History:   Diagnosis Date   • Abdominal  pain     Recurrent sx and follows with Dr. Heard.   • Abscess of buttock    • Acute gastritis    • Allergic rhinitis    • Alopecia    • Angina pectoris (CMS/HCC)    • Anxiety    • Arteriosclerotic coronary artery disease    • Benign prostatic hypertrophy (BPH) with nocturia    • Calf tenderness    • Cervical neck pain with evidence of disc disease    • DDD (degenerative disc disease), lumbar    • Degeneration of cervical intervertebral disc    • Depression    • Esophageal reflux     Follows with Dr. Jackson   • Fatigue    • Fever 04/2015    Low grade symptoms of fever for 3 weeks.   • Fibromyalgia    • Gastroparesis    • Health care maintenance    • Heart palpitations    • History of deep vein thrombophlebitis of lower extremity 2003    DVT Dx 2003   • Hypokalemia    • Idiopathic peripheral neuropathy    • Impaired fasting glucose    • Insomnia    • Lichen planus     tongue   • LOM (loss of memory)    • Mental status change     Episodes where he stares ahead and have some shaking of his limbs.  Possible seizure activity.  Referred to Dr. Santana, October 2, 2013.   • Nausea    • Onychomycosis of toenail    • Osteoarthritis of hip    • PN (peripheral neuropathy)    • Primary hypothyroidism    • Rash    • Sleep apnea    • Tendonitis of left hip    • Tendonitis of right hip    • TIA (transient ischemic attack) 06/2016   • Tinea versicolor        Past Surgical History:   Procedure Laterality Date   • APPENDECTOMY     • BACK SURGERY      Cervical   • CHOLECYSTECTOMY     • JOINT REPLACEMENT Bilateral     Hip   • LUMBAR FUSION     • SURAL NERVE/MUSCLE BIOPSY  04/2016   • TONSILLECTOMY         Social History     Social History   • Marital status:      Spouse name: N/A   • Number of children: 2   • Years of education: N/A     Occupational History   • Retired      Social History Main Topics   • Smoking status: Never Smoker   • Smokeless tobacco: Former User     Types: Chew   • Alcohol use No   • Drug use: No   •  Sexual activity: Defer     Other Topics Concern   • Not on file     Social History Narrative   • No narrative on file       Family History   Problem Relation Age of Onset   • Heart failure Mother         Congestive   • Heart disease Mother    • Atrial fibrillation Mother    • Alcohol abuse Paternal Uncle    • Heart failure Paternal Grandmother         Congestive   • No Known Problems Father    • No Known Problems Sister    • No Known Problems Brother    • Heart failure Maternal Grandmother    • Heart disease Maternal Grandmother    • No Known Problems Maternal Grandfather    • No Known Problems Paternal Grandfather        Review of Systems   Constitution: Negative for chills, fever and malaise/fatigue.   Cardiovascular: Positive for chest pain and near-syncope. Negative for dyspnea on exertion, leg swelling, orthopnea, palpitations, paroxysmal nocturnal dyspnea and syncope.   Respiratory: Positive for shortness of breath. Negative for cough.    Musculoskeletal: Negative for joint pain, joint swelling and myalgias.   Gastrointestinal: Negative for abdominal pain, diarrhea, melena, nausea and vomiting.   Genitourinary: Negative for frequency and hematuria.   Neurological: Negative for light-headedness, numbness, paresthesias and seizures.   Allergic/Immunologic: Negative.    All other systems reviewed and are negative.      Allergies   Allergen Reactions   • Butorphanol    • Hydroxyzine    • Iodinated Diagnostic Agents    • Iodine    • Other      Vistra 650 Tabs   • Phenytoin    • Amoxicillin Rash   • Clindamycin/Lincomycin Rash         Current Outpatient Prescriptions:   •  escitalopram (LEXAPRO) 20 MG tablet, Take 1 tablet by mouth daily., Disp: , Rfl:   •  EVZIO 2 MG/0.4ML solution auto-injector, , Disp: , Rfl:   •  fluticasone (FLONASE) 50 MCG/ACT nasal spray, 1 spray into each nostril 2 (two) times a day. Administer 2 sprays in each nostril for each dose., Disp: 3 each, Rfl: 3  •  gabapentin (NEURONTIN) 600 MG  "tablet, Take 1 tablet by mouth 4 (Four) Times a Day., Disp: , Rfl:   •  levothyroxine (SYNTHROID, LEVOTHROID) 88 MCG tablet, Take 1 tablet by mouth Daily., Disp: 90 tablet, Rfl: 3  •  lisdexamfetamine (VYVANSE) 70 MG capsule, Take 1 capsule by mouth every morning., Disp: , Rfl:   •  methadone (DOLOPHINE) 5 MG tablet, Take 5 mg by mouth 2 (Two) Times a Day., Disp: , Rfl:   •  nortriptyline (PAMELOR) 50 MG capsule, Take 100 mg by mouth Every Night., Disp: , Rfl:   •  ondansetron (ZOFRAN) 4 MG tablet, Take 4 mg by mouth every 6 (six) hours as needed for nausea or vomiting. Take for nausea and vomiting., Disp: , Rfl:   •  Sennosides (SENNA) 8.6 MG capsule, Take 1 capsule by mouth daily as needed., Disp: , Rfl:   •  sucralfate (CARAFATE) 1 GM/10ML suspension, Take 10 mL by mouth 4 (four) times a day as needed., Disp: , Rfl:   •  tamsulosin (FLOMAX) 0.4 MG capsule 24 hr capsule, Take 1 capsule by mouth daily., Disp: , Rfl:   •  tiZANidine (ZANAFLEX) 4 MG tablet, Take 4 mg by mouth 2 (Two) Times a Day As Needed., Disp: , Rfl:   •  Turmeric 450 MG capsule, Take 1 capsule by mouth Daily., Disp: , Rfl:   •  Unable to find, Domperidone POWD; 1 tablet twice daily., Disp: , Rfl:       Objective:     Vitals:    07/27/18 0932 07/27/18 0943   BP: 118/68 124/76   BP Location: Right arm Left arm   Pulse: 96    SpO2: 96%    Weight: 98.4 kg (217 lb)    Height: 188 cm (74\")      Body mass index is 27.86 kg/m².    PHYSICAL EXAM:    Physical Exam   Constitutional: He is oriented to person, place, and time. He appears well-developed and well-nourished. No distress.   HENT:   Head: Normocephalic and atraumatic.   Eyes: Pupils are equal, round, and reactive to light.   Neck: No JVD present. No thyromegaly present.   Cardiovascular: Normal rate, regular rhythm, normal heart sounds and intact distal pulses.    No murmur heard.  Pulmonary/Chest: Effort normal and breath sounds normal. No respiratory distress.   Abdominal: Soft. Bowel sounds are " normal. He exhibits no distension. There is no splenomegaly or hepatomegaly. There is no tenderness.   Musculoskeletal: Normal range of motion. He exhibits no edema.   Neurological: He is alert and oriented to person, place, and time.   Skin: Skin is warm and dry. He is not diaphoretic. No erythema.   Psychiatric: He has a normal mood and affect. His behavior is normal. Judgment normal.         ECG 12 Lead  Date/Time: 7/27/2018 9:47 AM  Performed by: VERITO ANGUIANO  Authorized by: VERITO ANGUIANO   Comparison: compared with previous ECG from 6/12/2018  Similar to previous ECG  Rhythm: sinus rhythm  BPM: 91  Clinical impression: normal ECG  Comments: Indication: coronary disease.              Assessment:       Diagnosis Plan   1. Chronic coronary artery disease  ECG 12 Lead     Orders Placed This Encounter   Procedures   • ECG 12 Lead     This order was created via procedure documentation          Plan:       1.  Coronary Artery Disease  Assessment  • The patient has no angina    Plan  • Lifestyle modifications discussed include adhering to a heart healthy diet and regular exercise    Subjective - Objective  • Overall he's doing well from a cardiac standpoint.  This is just a really nice nikki who has a lot of pain from his fibromyalgia.  Unfortunately, he is now dealing with the very tragic loss of his son.  I'm not going to make any changes to his medical regimen.  He did get some relief when he went to see the naturopathic doctor, however he has not been back for quite some time.  I've encouraged him to go back.  He will follow-up with Dr. Ventura in 1 year or sooner if needed.      As always, it has been a pleasure to participate in your patient's care.      Sincerely,         Verito Anguiano PA-C

## 2018-09-04 ENCOUNTER — RESULTS ENCOUNTER (OUTPATIENT)
Dept: ENDOCRINOLOGY | Age: 58
End: 2018-09-04

## 2018-09-04 DIAGNOSIS — E03.9 ACQUIRED HYPOTHYROIDISM: ICD-10-CM

## 2018-09-04 DIAGNOSIS — R73.03 PRE-DIABETES: ICD-10-CM

## 2018-09-04 DIAGNOSIS — E78.2 MIXED HYPERLIPIDEMIA: ICD-10-CM

## 2018-09-19 RX ORDER — LEVOTHYROXINE SODIUM 0.07 MG/1
75 TABLET ORAL DAILY
Qty: 90 TABLET | Refills: 3 | Status: SHIPPED | OUTPATIENT
Start: 2018-09-19 | End: 2018-10-10 | Stop reason: SDUPTHER

## 2018-09-26 LAB
25(OH)D3+25(OH)D2 SERPL-MCNC: 50.3 NG/ML (ref 30–100)
CHOLEST SERPL-MCNC: 167 MG/DL (ref 0–200)
FOLATE SERPL-MCNC: 15.25 NG/ML (ref 4.78–24.2)
HBA1C MFR BLD: 5.67 % (ref 4.8–5.6)
HDLC SERPL-MCNC: 47 MG/DL (ref 40–60)
INTERPRETATION: NORMAL
LDLC SERPL CALC-MCNC: 85 MG/DL (ref 0–100)
Lab: NORMAL
T4 FREE SERPL-MCNC: 1.29 NG/DL (ref 0.93–1.7)
TRIGL SERPL-MCNC: 176 MG/DL (ref 0–150)
TSH SERPL DL<=0.005 MIU/L-ACNC: 5.19 MIU/ML (ref 0.27–4.2)
VIT B12 SERPL-MCNC: 617 PG/ML (ref 211–946)
VLDLC SERPL CALC-MCNC: 35.2 MG/DL (ref 5–40)

## 2018-10-10 ENCOUNTER — OFFICE VISIT (OUTPATIENT)
Dept: ENDOCRINOLOGY | Age: 58
End: 2018-10-10

## 2018-10-10 VITALS
HEART RATE: 89 BPM | OXYGEN SATURATION: 98 % | DIASTOLIC BLOOD PRESSURE: 62 MMHG | HEIGHT: 74 IN | SYSTOLIC BLOOD PRESSURE: 122 MMHG | BODY MASS INDEX: 28.36 KG/M2 | WEIGHT: 221 LBS

## 2018-10-10 DIAGNOSIS — E78.2 MIXED HYPERLIPIDEMIA: ICD-10-CM

## 2018-10-10 DIAGNOSIS — R73.03 PRE-DIABETES: ICD-10-CM

## 2018-10-10 DIAGNOSIS — E03.9 ACQUIRED HYPOTHYROIDISM: Primary | ICD-10-CM

## 2018-10-10 PROCEDURE — 99214 OFFICE O/P EST MOD 30 MIN: CPT | Performed by: INTERNAL MEDICINE

## 2018-10-10 RX ORDER — LEVOTHYROXINE SODIUM 88 UG/1
88 TABLET ORAL DAILY
Qty: 90 TABLET | Refills: 3 | Status: SHIPPED | OUTPATIENT
Start: 2018-10-10 | End: 2018-11-29

## 2018-10-10 NOTE — PROGRESS NOTES
58 y.o.    Patient Care Team:  Cesar Hoover MD as PCP - General  Cesar Hoover MD as PCP - Family Medicine    Chief Complaint:    3 MONTH FOLLOW UP/ HYPOTHYROIDISM  Subjective     HPI    57-year-old white male is here as a follow up for the management of hypothyroidism, hypogonadism.     Hypothyroidism      Today in the clinic he reports he is on levothyroxine 75 mcg oral daily. Takes the medication every single day on empty stomach.   He reports poor energy levels, weight has been stable, fibromyalgia is worse with the stress. C/o constipation - stool softeners helping, c/o night sweats,   Sleep is ok. C/o cold intolerance at times.   No tremors, occasional racing of heart, no eye symptoms. Does have hx of orthostatic hypotension. Occasional dizziness and black outs.   No family history of thyroid disease.     Hyperlipidemia- not on any medications, patient is extremely concerned to be started on statins due to his history of fibromyalgia and statins could result in a side effect of myalgias.      Borderline diabetes   Patient went for diabetic education and has been educated on diet changes and exercise regimen.      Fibromyalgia  Being followed by pain specialist    Reviewed primary care physician's/consulting physician documentation and lab results :     Interval History      The following portions of the patient's history were reviewed and updated as appropriate: allergies, current medications, past family history, past medical history, past social history, past surgical history and problem list.    Past Medical History:   Diagnosis Date   • Abdominal pain     Recurrent sx and follows with Dr. Heard.   • Abscess of buttock    • Acute gastritis    • Allergic rhinitis    • Alopecia    • Angina pectoris (CMS/HCC)    • Anxiety    • Arteriosclerotic coronary artery disease    • Benign prostatic hypertrophy (BPH) with nocturia    • Calf tenderness    • Cervical neck pain with evidence of disc disease    •  DDD (degenerative disc disease), lumbar    • Degeneration of cervical intervertebral disc    • Depression    • Esophageal reflux     Follows with Dr. Jackson   • Fatigue    • Fever 04/2015    Low grade symptoms of fever for 3 weeks.   • Fibromyalgia    • Gastroparesis    • Health care maintenance    • Heart palpitations    • History of deep vein thrombophlebitis of lower extremity 2003    DVT Dx 2003   • Hypokalemia    • Idiopathic peripheral neuropathy    • Impaired fasting glucose    • Insomnia    • Lichen planus     tongue   • LOM (loss of memory)    • Mental status change     Episodes where he stares ahead and have some shaking of his limbs.  Possible seizure activity.  Referred to Dr. Santana, October 2, 2013.   • Nausea    • Onychomycosis of toenail    • Osteoarthritis of hip    • PN (peripheral neuropathy)    • Primary hypothyroidism    • Rash    • Sleep apnea    • Tendonitis of left hip    • Tendonitis of right hip    • TIA (transient ischemic attack) 06/2016   • Tinea versicolor      Family History   Problem Relation Age of Onset   • Heart failure Mother         Congestive   • Heart disease Mother    • Atrial fibrillation Mother    • Alcohol abuse Paternal Uncle    • Heart failure Paternal Grandmother         Congestive   • No Known Problems Father    • No Known Problems Sister    • No Known Problems Brother    • Heart failure Maternal Grandmother    • Heart disease Maternal Grandmother    • No Known Problems Maternal Grandfather    • No Known Problems Paternal Grandfather      Social History     Social History   • Marital status:      Spouse name: N/A   • Number of children: 2   • Years of education: N/A     Occupational History   • Retired      Social History Main Topics   • Smoking status: Never Smoker   • Smokeless tobacco: Former User     Types: Chew   • Alcohol use No   • Drug use: No   • Sexual activity: Defer     Other Topics Concern   • Not on file     Social History Narrative   • No  narrative on file     Allergies   Allergen Reactions   • Butorphanol    • Hydroxyzine    • Iodinated Diagnostic Agents    • Iodine    • Other      Vistra 650 Tabs   • Phenytoin    • Amoxicillin Rash   • Clindamycin/Lincomycin Rash       Current Outpatient Prescriptions:   •  escitalopram (LEXAPRO) 20 MG tablet, Take 1 tablet by mouth daily., Disp: , Rfl:   •  EVZIO 2 MG/0.4ML solution auto-injector, , Disp: , Rfl:   •  fluticasone (FLONASE) 50 MCG/ACT nasal spray, 1 spray into each nostril 2 (two) times a day. Administer 2 sprays in each nostril for each dose., Disp: 3 each, Rfl: 3  •  gabapentin (NEURONTIN) 600 MG tablet, Take 1 tablet by mouth 4 (Four) Times a Day., Disp: , Rfl:   •  levothyroxine (SYNTHROID, LEVOTHROID) 88 MCG tablet, Take 1 tablet by mouth Daily., Disp: 90 tablet, Rfl: 3  •  lisdexamfetamine (VYVANSE) 70 MG capsule, Take 1 capsule by mouth every morning., Disp: , Rfl:   •  methadone (DOLOPHINE) 5 MG tablet, Take 5 mg by mouth 2 (Two) Times a Day., Disp: , Rfl:   •  nortriptyline (PAMELOR) 50 MG capsule, Take 100 mg by mouth Every Night., Disp: , Rfl:   •  ondansetron (ZOFRAN) 4 MG tablet, Take 4 mg by mouth every 6 (six) hours as needed for nausea or vomiting. Take for nausea and vomiting., Disp: , Rfl:   •  Sennosides (SENNA) 8.6 MG capsule, Take 1 capsule by mouth daily as needed., Disp: , Rfl:   •  sucralfate (CARAFATE) 1 GM/10ML suspension, Take 10 mL by mouth 4 (four) times a day as needed., Disp: , Rfl:   •  tamsulosin (FLOMAX) 0.4 MG capsule 24 hr capsule, Take 1 capsule by mouth daily., Disp: , Rfl:   •  tiZANidine (ZANAFLEX) 4 MG tablet, Take 4 mg by mouth 2 (Two) Times a Day As Needed., Disp: , Rfl:   •  Turmeric 450 MG capsule, Take 1 capsule by mouth Daily., Disp: , Rfl:   •  Unable to find, Domperidone POWD; 1 tablet twice daily., Disp: , Rfl:         Review of Systems   Constitutional: Positive for fatigue. Negative for appetite change and fever.   Eyes: Positive for visual  "disturbance.   Respiratory: Negative for shortness of breath.    Cardiovascular: Positive for leg swelling. Negative for palpitations.   Gastrointestinal: Positive for abdominal pain. Negative for vomiting.   Endocrine: Negative for polydipsia and polyuria.   Musculoskeletal: Positive for neck pain. Negative for joint swelling.   Skin: Negative for rash.   Neurological: Positive for numbness.   Psychiatric/Behavioral: Negative for behavioral problems.       Objective       Vitals:    10/10/18 0942   BP: 122/62   Pulse: 89   SpO2: 98%   Weight: 100 kg (221 lb)   Height: 188 cm (74\")     Body mass index is 28.37 kg/m².      Physical Exam   Constitutional: He is oriented to person, place, and time. He appears well-nourished.   HENT:   Head: Normocephalic and atraumatic.   Eyes: Conjunctivae and EOM are normal. No scleral icterus.   Neck: No thyromegaly present.   Cardiovascular: Normal rate and regular rhythm.    Pulmonary/Chest: Effort normal and breath sounds normal. No stridor. No respiratory distress. He has no wheezes.   Abdominal: Soft. Bowel sounds are normal. He exhibits no distension. There is no tenderness.   Central obesity   Musculoskeletal: He exhibits no edema or deformity.   Lymphadenopathy:     He has no cervical adenopathy.   Neurological: He is alert and oriented to person, place, and time.   Skin: Skin is warm and dry. No rash noted. He is not diaphoretic.   Psychiatric: He has a normal mood and affect.   Vitals reviewed.    Results Review:     I reviewed the patient's new clinical results and mentioned them above in HPI and in plan as well.    Medical records reviewed  Summary: done      Results Encounter on 09/04/2018   Component Date Value Ref Range Status   • TSH 09/26/2018 5.190* 0.270 - 4.200 mIU/mL Final   • Free T4 09/26/2018 1.29  0.93 - 1.70 ng/dL Final   • Hemoglobin A1C 09/26/2018 5.67* 4.80 - 5.60 % Final    Comment: Hemoglobin A1C Ranges:  Increased Risk for Diabetes  5.7% to " 6.4%  Diabetes                     >= 6.5%  Diabetic Goal                < 7.0%     • Total Cholesterol 09/26/2018 167  0 - 200 mg/dL Final   • Triglycerides 09/26/2018 176* 0 - 150 mg/dL Final   • HDL Cholesterol 09/26/2018 47  40 - 60 mg/dL Final   • VLDL Cholesterol 09/26/2018 35.2  5 - 40 mg/dL Final   • LDL Cholesterol  09/26/2018 85  0 - 100 mg/dL Final   • Vitamin B-12 09/26/2018 617  211 - 946 pg/mL Final   • Folate 09/26/2018 15.25  4.78 - 24.20 ng/mL Final   • 25 Hydroxy, Vitamin D 09/26/2018 50.3  30.0 - 100.0 ng/ml Final    Comment: Reference Range for Total Vitamin D 25(OH)  Deficiency    <20.0 ng/mL  Insufficiency 21-29 ng/mL  Sufficiency    ng/mL  Toxicity      >100 ng/ml        • Interpretation 09/26/2018 Note   Final    Supplemental report is available.   • PDF Image 09/26/2018 Not applicable   Final     Lab Results   Component Value Date    HGBA1C 5.67 (H) 09/26/2018    HGBA1C 5.75 (H) 04/16/2018    HGBA1C 5.85 (H) 08/28/2017     Lab Results   Component Value Date    CREATININE 1.41 (H) 08/28/2017     Imaging Results (most recent)     None                Assessment and Plan:    Joel was seen today for hypothyroidism.    Diagnoses and all orders for this visit:    Acquired hypothyroidism  -     Hemoglobin A1c; Future  -     Basic Metabolic Panel; Future  -     Lipid Panel; Future  -     TSH; Future  -     Vitamin B12 & Folate; Future  -     Vitamin D 25 Hydroxy; Future  -     T4, Free; Future  -     TSH; Future  -     T4, Free; Future    Mixed hyperlipidemia  -     Hemoglobin A1c; Future  -     Basic Metabolic Panel; Future  -     Lipid Panel; Future  -     TSH; Future  -     Vitamin B12 & Folate; Future  -     Vitamin D 25 Hydroxy; Future  -     T4, Free; Future  -     TSH; Future  -     T4, Free; Future    Pre-diabetes  -     Hemoglobin A1c; Future  -     Basic Metabolic Panel; Future  -     Lipid Panel; Future  -     TSH; Future  -     Vitamin B12 & Folate; Future  -     Vitamin D 25  "Hydroxy; Future  -     T4, Free; Future  -     TSH; Future  -     T4, Free; Future    Other orders  -     levothyroxine (SYNTHROID, LEVOTHROID) 88 MCG tablet; Take 1 tablet by mouth Daily.    Hypothyroidism - levels are worse  Patient is still symptomatic  Thyroid levels are slightly worse.  We will increase the dosage of levothyroxine to 88 µg oral daily.  Will repeat the thyroid function this in the next 6-8 weeks to make sure that the levels have stabilized with the change.    Borderline diabetes  Advised him to monitor his diet and exercise.  Counseled him about low carbohydrate diets.    Hyperlipidemia  Patient is not on any statin.  He has severe fibromyalgia and is afraid to be started on statins.      Reviewed Lab results with the patient.             Janelle Serna MD  10/10/18    EMR Dragon / transcription disclaimer:     \"Dictated utilizing Dragon dictation\".  "

## 2018-11-21 ENCOUNTER — RESULTS ENCOUNTER (OUTPATIENT)
Dept: ENDOCRINOLOGY | Age: 58
End: 2018-11-21

## 2018-11-21 DIAGNOSIS — R73.03 PRE-DIABETES: ICD-10-CM

## 2018-11-21 DIAGNOSIS — E03.9 ACQUIRED HYPOTHYROIDISM: ICD-10-CM

## 2018-11-21 DIAGNOSIS — E78.2 MIXED HYPERLIPIDEMIA: ICD-10-CM

## 2018-11-28 ENCOUNTER — LAB (OUTPATIENT)
Dept: ENDOCRINOLOGY | Age: 58
End: 2018-11-28

## 2018-11-28 DIAGNOSIS — E78.2 MIXED HYPERLIPIDEMIA: ICD-10-CM

## 2018-11-28 DIAGNOSIS — R73.03 PRE-DIABETES: ICD-10-CM

## 2018-11-28 DIAGNOSIS — E03.9 ACQUIRED HYPOTHYROIDISM: ICD-10-CM

## 2018-11-28 LAB
25(OH)D3+25(OH)D2 SERPL-MCNC: 50.8 NG/ML (ref 30–100)
BUN SERPL-MCNC: 11 MG/DL (ref 8–23)
BUN/CREAT SERPL: 7.9 (ref 7–25)
CALCIUM SERPL-MCNC: 10.1 MG/DL (ref 8.6–10.5)
CHLORIDE SERPL-SCNC: 103 MMOL/L (ref 98–107)
CHOLEST SERPL-MCNC: 180 MG/DL (ref 0–200)
CO2 SERPL-SCNC: 25.9 MMOL/L (ref 22–29)
CREAT SERPL-MCNC: 1.39 MG/DL (ref 0.76–1.27)
FOLATE SERPL-MCNC: >20 NG/ML (ref 4.78–24.2)
GLUCOSE SERPL-MCNC: 55 MG/DL (ref 65–99)
HBA1C MFR BLD: 5.7 % (ref 4.8–5.6)
HDLC SERPL-MCNC: 49 MG/DL (ref 40–60)
INTERPRETATION: NORMAL
LDLC SERPL CALC-MCNC: 74 MG/DL (ref 0–100)
Lab: NORMAL
POTASSIUM SERPL-SCNC: 4.6 MMOL/L (ref 3.5–5.2)
SODIUM SERPL-SCNC: 145 MMOL/L (ref 136–145)
T4 FREE SERPL-MCNC: 1.32 NG/DL (ref 0.93–1.7)
TRIGL SERPL-MCNC: 286 MG/DL (ref 0–150)
TSH SERPL DL<=0.005 MIU/L-ACNC: 4.36 MIU/ML (ref 0.27–4.2)
VIT B12 SERPL-MCNC: 936 PG/ML (ref 211–946)
VLDLC SERPL CALC-MCNC: 57.2 MG/DL (ref 5–40)

## 2018-11-29 RX ORDER — LEVOTHYROXINE SODIUM 0.1 MG/1
100 TABLET ORAL DAILY
Qty: 90 TABLET | Refills: 3 | Status: SHIPPED | OUTPATIENT
Start: 2018-11-29 | End: 2021-05-17

## 2019-01-23 DIAGNOSIS — R73.03 PRE-DIABETES: Primary | ICD-10-CM

## 2019-01-23 DIAGNOSIS — R73.01 IMPAIRED FASTING GLUCOSE: ICD-10-CM

## 2019-01-23 DIAGNOSIS — E78.49 OTHER HYPERLIPIDEMIA: ICD-10-CM

## 2019-01-23 DIAGNOSIS — E03.8 OTHER SPECIFIED HYPOTHYROIDISM: ICD-10-CM

## 2019-01-28 ENCOUNTER — RESULTS ENCOUNTER (OUTPATIENT)
Dept: ENDOCRINOLOGY | Age: 59
End: 2019-01-28

## 2019-01-28 DIAGNOSIS — R73.03 PRE-DIABETES: ICD-10-CM

## 2019-01-28 DIAGNOSIS — E78.49 OTHER HYPERLIPIDEMIA: ICD-10-CM

## 2019-01-28 DIAGNOSIS — E03.8 OTHER SPECIFIED HYPOTHYROIDISM: ICD-10-CM

## 2019-01-28 DIAGNOSIS — R73.01 IMPAIRED FASTING GLUCOSE: ICD-10-CM

## 2019-02-05 LAB
ALBUMIN SERPL-MCNC: 4.8 G/DL (ref 3.5–5.2)
ALBUMIN/GLOB SERPL: 2.2 G/DL
ALP SERPL-CCNC: 100 U/L (ref 39–117)
ALT SERPL-CCNC: 15 U/L (ref 1–41)
AST SERPL-CCNC: 16 U/L (ref 1–40)
BILIRUB SERPL-MCNC: 0.4 MG/DL (ref 0.1–1.2)
BUN SERPL-MCNC: 16 MG/DL (ref 8–23)
BUN/CREAT SERPL: 11 (ref 7–25)
CALCIUM SERPL-MCNC: 9.9 MG/DL (ref 8.6–10.5)
CHLORIDE SERPL-SCNC: 102 MMOL/L (ref 98–107)
CHOLEST SERPL-MCNC: 176 MG/DL (ref 0–200)
CO2 SERPL-SCNC: 25.7 MMOL/L (ref 22–29)
CREAT SERPL-MCNC: 1.45 MG/DL (ref 0.76–1.27)
GLOBULIN SER CALC-MCNC: 2.2 GM/DL
GLUCOSE SERPL-MCNC: 105 MG/DL (ref 65–99)
HBA1C MFR BLD: 5.6 % (ref 4.8–5.6)
HDLC SERPL-MCNC: 59 MG/DL (ref 40–60)
INTERPRETATION: NORMAL
LDLC SERPL CALC-MCNC: 98 MG/DL (ref 0–100)
Lab: NORMAL
POTASSIUM SERPL-SCNC: 5 MMOL/L (ref 3.5–5.2)
PROT SERPL-MCNC: 7 G/DL (ref 6–8.5)
SODIUM SERPL-SCNC: 143 MMOL/L (ref 136–145)
T4 FREE SERPL-MCNC: 1.29 NG/DL (ref 0.93–1.7)
TRIGL SERPL-MCNC: 97 MG/DL (ref 0–150)
TSH SERPL DL<=0.005 MIU/L-ACNC: 1.99 MIU/ML (ref 0.27–4.2)
VLDLC SERPL CALC-MCNC: 19.4 MG/DL (ref 5–40)

## 2019-02-11 ENCOUNTER — OFFICE VISIT (OUTPATIENT)
Dept: ENDOCRINOLOGY | Age: 59
End: 2019-02-11

## 2019-02-11 VITALS
WEIGHT: 210 LBS | HEART RATE: 62 BPM | SYSTOLIC BLOOD PRESSURE: 126 MMHG | DIASTOLIC BLOOD PRESSURE: 70 MMHG | BODY MASS INDEX: 26.95 KG/M2 | OXYGEN SATURATION: 97 % | HEIGHT: 74 IN

## 2019-02-11 DIAGNOSIS — E03.9 ACQUIRED HYPOTHYROIDISM: Primary | ICD-10-CM

## 2019-02-11 DIAGNOSIS — R53.82 CHRONIC FATIGUE: ICD-10-CM

## 2019-02-11 PROCEDURE — 99214 OFFICE O/P EST MOD 30 MIN: CPT | Performed by: INTERNAL MEDICINE

## 2019-02-11 NOTE — PROGRESS NOTES
58 y.o.    Patient Care Team:  Cesar Hoover MD as PCP - General  Cesar Hoover MD as PCP - Family Medicine    Chief Complaint:    5 MONTH FOLLOW UP/ HYPOTHYROIDISM  Subjective     HPI    58-year-old white male is here as a follow up for the management of hypothyroidism, hypogonadism.     Hypothyroidism  Today in the clinic pt reports that he is on levothyroxine 100 mcg oral daily. Takes the medication every single day on empty stomach.   He does reports that his energy levels are bad due to his moms recent's cancer diagnosis, stage 4 mesenteric cancer. Weight has been stable, improved constipation with stool softners.   Hasn't been sleeping well. Pt is still recovering from his sons death and no with his mothers diagnosis he is stressed.   Still has alternating hot and cold temperature issues.   Some tremors at times and no racing of heart and no eye symptoms.  Does have hx of orthostatic hypotension. Occasional dizziness and black outs.   No family history of thyroid disease.     Hyperlipidemia- not on any medications      Borderline diabetes   Patient went for diabetic education and has been educated on diet changes and exercise regimen.      Fibromyalgia  Being followed by pain specialist     Reviewed primary care physician's/consulting physician documentation and lab results :     Interval History      The following portions of the patient's history were reviewed and updated as appropriate: allergies, current medications, past family history, past medical history, past social history, past surgical history and problem list.    Past Medical History:   Diagnosis Date   • Abdominal pain     Recurrent sx and follows with Dr. Heard.   • Abscess of buttock    • Acute gastritis    • Allergic rhinitis    • Alopecia    • Angina pectoris (CMS/HCC)    • Anxiety    • Arteriosclerotic coronary artery disease    • Benign prostatic hypertrophy (BPH) with nocturia    • Calf tenderness    • Cervical neck pain with  evidence of disc disease    • DDD (degenerative disc disease), lumbar    • Degeneration of cervical intervertebral disc    • Depression    • Esophageal reflux     Follows with Dr. Jackson   • Fatigue    • Fever 04/2015    Low grade symptoms of fever for 3 weeks.   • Fibromyalgia    • Gastroparesis    • Health care maintenance    • Heart palpitations    • History of deep vein thrombophlebitis of lower extremity 2003    DVT Dx 2003   • Hypokalemia    • Idiopathic peripheral neuropathy    • Impaired fasting glucose    • Insomnia    • Lichen planus     tongue   • LOM (loss of memory)    • Mental status change     Episodes where he stares ahead and have some shaking of his limbs.  Possible seizure activity.  Referred to Dr. Santana, October 2, 2013.   • Nausea    • Onychomycosis of toenail    • Osteoarthritis of hip    • PN (peripheral neuropathy)    • Primary hypothyroidism    • Rash    • Sleep apnea    • Tendonitis of left hip    • Tendonitis of right hip    • TIA (transient ischemic attack) 06/2016   • Tinea versicolor      Family History   Problem Relation Age of Onset   • Heart failure Mother         Congestive   • Heart disease Mother    • Atrial fibrillation Mother    • Alcohol abuse Paternal Uncle    • Heart failure Paternal Grandmother         Congestive   • No Known Problems Father    • No Known Problems Sister    • No Known Problems Brother    • Heart failure Maternal Grandmother    • Heart disease Maternal Grandmother    • No Known Problems Maternal Grandfather    • No Known Problems Paternal Grandfather      Social History     Socioeconomic History   • Marital status:      Spouse name: Not on file   • Number of children: 2   • Years of education: Not on file   • Highest education level: Not on file   Social Needs   • Financial resource strain: Not on file   • Food insecurity - worry: Not on file   • Food insecurity - inability: Not on file   • Transportation needs - medical: Not on file   •  Transportation needs - non-medical: Not on file   Occupational History   • Occupation: Retired   Tobacco Use   • Smoking status: Never Smoker   • Smokeless tobacco: Former User     Types: Chew   Substance and Sexual Activity   • Alcohol use: No   • Drug use: No   • Sexual activity: Defer   Other Topics Concern   • Not on file   Social History Narrative   • Not on file     Allergies   Allergen Reactions   • Butorphanol    • Hydroxyzine    • Iodinated Diagnostic Agents    • Iodine    • Other      Vistra 650 Tabs   • Phenytoin    • Amoxicillin Rash   • Clindamycin/Lincomycin Rash       Current Outpatient Medications:   •  escitalopram (LEXAPRO) 20 MG tablet, Take 1 tablet by mouth daily., Disp: , Rfl:   •  EVZIO 2 MG/0.4ML solution auto-injector, , Disp: , Rfl:   •  fluticasone (FLONASE) 50 MCG/ACT nasal spray, 1 spray into each nostril 2 (two) times a day. Administer 2 sprays in each nostril for each dose., Disp: 3 each, Rfl: 3  •  gabapentin (NEURONTIN) 600 MG tablet, Take 1 tablet by mouth 4 (Four) Times a Day., Disp: , Rfl:   •  levothyroxine (SYNTHROID, LEVOTHROID) 100 MCG tablet, Take 1 tablet by mouth Daily., Disp: 90 tablet, Rfl: 3  •  lisdexamfetamine (VYVANSE) 70 MG capsule, Take 1 capsule by mouth every morning., Disp: , Rfl:   •  methadone (DOLOPHINE) 5 MG tablet, Take 5 mg by mouth 2 (Two) Times a Day., Disp: , Rfl:   •  nortriptyline (PAMELOR) 50 MG capsule, Take 100 mg by mouth Every Night., Disp: , Rfl:   •  ondansetron (ZOFRAN) 4 MG tablet, Take 4 mg by mouth every 6 (six) hours as needed for nausea or vomiting. Take for nausea and vomiting., Disp: , Rfl:   •  Sennosides (SENNA) 8.6 MG capsule, Take 1 capsule by mouth daily as needed., Disp: , Rfl:   •  sucralfate (CARAFATE) 1 GM/10ML suspension, Take 10 mL by mouth 4 (four) times a day as needed., Disp: , Rfl:   •  tamsulosin (FLOMAX) 0.4 MG capsule 24 hr capsule, Take 1 capsule by mouth daily., Disp: , Rfl:   •  tiZANidine (ZANAFLEX) 4 MG tablet,  "Take 4 mg by mouth 2 (Two) Times a Day As Needed., Disp: , Rfl:   •  Turmeric 450 MG capsule, Take 1 capsule by mouth Daily., Disp: , Rfl:   •  Unable to find, Domperidone POWD; 1 tablet twice daily., Disp: , Rfl:         Review of Systems   Constitutional: Positive for fatigue. Negative for appetite change and fever.   Eyes: Negative for visual disturbance.   Respiratory: Positive for shortness of breath.    Cardiovascular: Positive for leg swelling. Negative for palpitations.   Gastrointestinal: Negative for abdominal pain and vomiting.   Endocrine: Negative for polydipsia and polyuria.   Musculoskeletal: Positive for neck pain. Negative for joint swelling.   Skin: Negative for rash.   Neurological: Positive for weakness and numbness.   Psychiatric/Behavioral: Negative for behavioral problems.       Objective       Vitals:    02/11/19 1047   BP: 126/70   Pulse: 62   SpO2: 97%   Weight: 95.3 kg (210 lb)   Height: 188 cm (74\")     Body mass index is 26.96 kg/m².      Physical Exam   Constitutional: He is oriented to person, place, and time. He appears well-nourished.   HENT:   Head: Normocephalic and atraumatic.   Eyes: Conjunctivae and EOM are normal. No scleral icterus.   Neck: No thyromegaly present.   Cardiovascular: Normal rate and regular rhythm.   Pulmonary/Chest: Effort normal and breath sounds normal. No stridor. No respiratory distress. He has no wheezes.   Abdominal: Soft. Bowel sounds are normal. He exhibits no distension. There is no tenderness.   Central obesity   Musculoskeletal: He exhibits no edema or deformity.   Lymphadenopathy:     He has no cervical adenopathy.   Neurological: He is alert and oriented to person, place, and time.   Skin: Skin is warm and dry. No rash noted. He is not diaphoretic.   Psychiatric: He has a normal mood and affect.   Vitals reviewed.    Results Review:     I reviewed the patient's new clinical results and mentioned them above in HPI and in plan as well.    Medical " records reviewed  Summary:DONE      Results Encounter on 01/28/2019   Component Date Value Ref Range Status   • TSH 02/04/2019 1.990  0.270 - 4.200 mIU/mL Final   • Free T4 02/04/2019 1.29  0.93 - 1.70 ng/dL Final   • Glucose 02/04/2019 105* 65 - 99 mg/dL Final   • BUN 02/04/2019 16  8 - 23 mg/dL Final   • Creatinine 02/04/2019 1.45* 0.76 - 1.27 mg/dL Final   • eGFR Non African Am 02/04/2019 50* >60 mL/min/1.73 Final   • eGFR African Am 02/04/2019 60* >60 mL/min/1.73 Final   • BUN/Creatinine Ratio 02/04/2019 11.0  7.0 - 25.0 Final   • Sodium 02/04/2019 143  136 - 145 mmol/L Final   • Potassium 02/04/2019 5.0  3.5 - 5.2 mmol/L Final   • Chloride 02/04/2019 102  98 - 107 mmol/L Final   • Total CO2 02/04/2019 25.7  22.0 - 29.0 mmol/L Final   • Calcium 02/04/2019 9.9  8.6 - 10.5 mg/dL Final   • Total Protein 02/04/2019 7.0  6.0 - 8.5 g/dL Final   • Albumin 02/04/2019 4.80  3.50 - 5.20 g/dL Final   • Globulin 02/04/2019 2.2  gm/dL Final   • A/G Ratio 02/04/2019 2.2  g/dL Final   • Total Bilirubin 02/04/2019 0.4  0.1 - 1.2 mg/dL Final   • Alkaline Phosphatase 02/04/2019 100  39 - 117 U/L Final   • AST (SGOT) 02/04/2019 16  1 - 40 U/L Final   • ALT (SGPT) 02/04/2019 15  1 - 41 U/L Final   • Total Cholesterol 02/04/2019 176  0 - 200 mg/dL Final   • Triglycerides 02/04/2019 97  0 - 150 mg/dL Final   • HDL Cholesterol 02/04/2019 59  40 - 60 mg/dL Final   • VLDL Cholesterol 02/04/2019 19.4  5 - 40 mg/dL Final   • LDL Cholesterol  02/04/2019 98  0 - 100 mg/dL Final   • Hemoglobin A1C 02/04/2019 5.60  4.80 - 5.60 % Final    Comment: Hemoglobin A1C Ranges:  Increased Risk for Diabetes  5.7% to 6.4%  Diabetes                     >= 6.5%  Diabetic Goal                < 7.0%     • Interpretation 02/04/2019 Note   Final    Supplemental report is available.   • PDF Image 02/04/2019 Not applicable   Final     Lab Results   Component Value Date    HGBA1C 5.60 02/04/2019    HGBA1C 5.70 (H) 11/28/2018    HGBA1C 5.67 (H) 09/26/2018     Lab  "Results   Component Value Date    CREATININE 1.45 (H) 02/04/2019     Imaging Results (most recent)     None                Assessment and Plan:    Joel was seen today for hypothyroidism.    Diagnoses and all orders for this visit:    Acquired hypothyroidism  -     TSH; Future  -     T4, Free; Future  -     Hemoglobin A1c; Future  -     Basic Metabolic Panel; Future  -     Lipid Panel; Future  -     Vitamin B12 & Folate; Future  -     Vitamin D 25 Hydroxy; Future    Chronic fatigue  -     TSH; Future  -     T4, Free; Future  -     Hemoglobin A1c; Future  -     Basic Metabolic Panel; Future  -     Lipid Panel; Future  -     Vitamin B12 & Folate; Future  -     Vitamin D 25 Hydroxy; Future      Hypothyroidism  Symptoms are still worse  Continue levothyroxine 100 mcg oral daily  Noted that TSH, free T4 levels are within normal limits    Chronic fatigue-multifactorial  Advised the patient to reconsider a sleep study as it might improve his energy levels and also fibromyalgia    Borderline diabetes-improved    Hyperlipidemia  LDL is improving, will continue to monitor his levels.  Not on any statins at this time.    Reviewed Lab results with the patient.             Janelle Serna MD  02/11/19    EMR Dragon / transcription disclaimer:     \"Dictated utilizing Dragon dictation\".  "

## 2019-05-20 ENCOUNTER — OFFICE VISIT (OUTPATIENT)
Dept: INTERNAL MEDICINE | Facility: CLINIC | Age: 59
End: 2019-05-20

## 2019-05-20 VITALS
OXYGEN SATURATION: 93 % | WEIGHT: 222.4 LBS | DIASTOLIC BLOOD PRESSURE: 70 MMHG | TEMPERATURE: 98.8 F | BODY MASS INDEX: 28.55 KG/M2 | SYSTOLIC BLOOD PRESSURE: 112 MMHG | HEART RATE: 79 BPM

## 2019-05-20 DIAGNOSIS — Z00.00 ANNUAL PHYSICAL EXAM: Primary | ICD-10-CM

## 2019-05-20 DIAGNOSIS — M79.7 FIBROMYALGIA: ICD-10-CM

## 2019-05-20 PROBLEM — J40 BRONCHITIS: Status: RESOLVED | Noted: 2017-08-14 | Resolved: 2019-05-20

## 2019-05-20 PROCEDURE — 99396 PREV VISIT EST AGE 40-64: CPT | Performed by: FAMILY MEDICINE

## 2019-05-20 NOTE — PROGRESS NOTES
Subjective   Joel Keating is a 58 y.o. male.     Chief Complaint   Patient presents with   • Annual Exam   • Disability form - fibromyalgia     sees pain management for this         History of Present Illness   Joel Keating 58 y.o. male who presents for an Annual Wellness Visit.  he has a history of   Patient Active Problem List   Diagnosis   • Atopic rhinitis   • Anxiety   • Chronic coronary artery disease   • Benign prostatic hyperplasia with urinary obstruction   • Tenderness of lower extremity   • Degeneration of intervertebral disc of cervical region   • Depression   • Degeneration of intervertebral disc of lumbar region   • Fatigue   • Fibromyalgia   • Gastroparesis   • Herpes simplex virus (HSV) infection   • Impaired fasting glucose   • Insomnia   • Memory loss   • Onychomycosis of toenail   • Osteoarthritis of hip   • Peripheral neuropathy   • Hypothyroidism   • Sleep apnea   • Tinea versicolor   • Annual physical exam   • Chronic pain syndrome   • Renal insufficiency   .  he has been feeling poorly.  Labs results discussed in detail with the patient.  Plan to update vaccines if needed today.  I  reviewed health maintenance with him as part of my preventative care plan.  Patient received most of his treatment plan for his chronic medical problems through pain management  Health Habits:  Dental Exam. up to date  Eye Exam. up to date  Exercise: 0 times/week.  Current exercise activities include: walking        The following portions of the patient's history were reviewed and updated as appropriate: allergies, current medications, past family history, past medical history, past social history, past surgical history and problem list.    Review of Systems   Constitutional: Positive for activity change and fatigue. Negative for chills.   HENT: Negative.    Eyes: Negative.    Respiratory: Negative.    Cardiovascular: Negative.    Gastrointestinal: Negative for abdominal distention, abdominal pain, constipation,  diarrhea, rectal pain and vomiting.   Endocrine: Negative.    Genitourinary: Negative.    Musculoskeletal: Positive for arthralgias, back pain, gait problem and myalgias.   Allergic/Immunologic: Negative.    Neurological: Positive for dizziness, seizures and syncope.   Hematological: Negative.    Psychiatric/Behavioral: Positive for sleep disturbance. Negative for confusion, dysphoric mood, hallucinations and suicidal ideas. The patient is not hyperactive.        Objective   Physical Exam   Constitutional: He is oriented to person, place, and time. He appears well-developed and well-nourished. No distress.   HENT:   Head: Normocephalic and atraumatic.   Right Ear: External ear normal.   Left Ear: External ear normal.   Mouth/Throat: Oropharynx is clear and moist.   Eyes: Conjunctivae and EOM are normal. Pupils are equal, round, and reactive to light. Right eye exhibits no discharge. Left eye exhibits no discharge. No scleral icterus.   Neck: Normal range of motion. Neck supple. No tracheal deviation present. No thyromegaly present.   Cardiovascular: Normal rate, regular rhythm, normal heart sounds, intact distal pulses and normal pulses. Exam reveals no gallop.   No murmur heard.  Pulmonary/Chest: Effort normal and breath sounds normal. No respiratory distress. He has no wheezes. He has no rales.   Abdominal: Soft. Bowel sounds are normal. There is no tenderness.   Musculoskeletal: Normal range of motion. He exhibits tenderness.   Neurological: He is alert and oriented to person, place, and time. He exhibits normal muscle tone. Coordination normal.   Skin: Skin is warm. No rash noted. No erythema. No pallor.   Psychiatric: He has a normal mood and affect. His behavior is normal. Judgment and thought content normal.   Nursing note and vitals reviewed.      Assessment/Plan   Joel was seen today for annual exam and disability form - fibromyalgia.    Diagnoses and all orders for this visit:    Annual physical  exam    Fibromyalgia    Continue present treatment plan with pain management as well as attempts at regular exercise all up otherwise as needed or annually

## 2019-07-29 ENCOUNTER — LAB (OUTPATIENT)
Dept: ENDOCRINOLOGY | Age: 59
End: 2019-07-29

## 2019-07-29 DIAGNOSIS — R53.82 CHRONIC FATIGUE: ICD-10-CM

## 2019-07-29 DIAGNOSIS — E03.9 ACQUIRED HYPOTHYROIDISM: ICD-10-CM

## 2019-07-29 LAB
25(OH)D3+25(OH)D2 SERPL-MCNC: 54.5 NG/ML (ref 30–100)
BUN SERPL-MCNC: 10 MG/DL (ref 8–23)
BUN/CREAT SERPL: 7.5 (ref 7–25)
CALCIUM SERPL-MCNC: 9.2 MG/DL (ref 8.6–10.5)
CHLORIDE SERPL-SCNC: 102 MMOL/L (ref 98–107)
CHOLEST SERPL-MCNC: 162 MG/DL (ref 0–200)
CO2 SERPL-SCNC: 29.2 MMOL/L (ref 22–29)
CREAT SERPL-MCNC: 1.34 MG/DL (ref 0.76–1.27)
FOLATE SERPL-MCNC: >20 NG/ML (ref 4.78–24.2)
GLUCOSE SERPL-MCNC: 96 MG/DL (ref 65–99)
HBA1C MFR BLD: 6 % (ref 4.8–5.6)
HDLC SERPL-MCNC: 36 MG/DL (ref 40–60)
INTERPRETATION: NORMAL
LDLC SERPL CALC-MCNC: 52 MG/DL (ref 0–100)
Lab: NORMAL
POTASSIUM SERPL-SCNC: 3.9 MMOL/L (ref 3.5–5.2)
SODIUM SERPL-SCNC: 143 MMOL/L (ref 136–145)
T4 FREE SERPL-MCNC: 1.29 NG/DL (ref 0.93–1.7)
TRIGL SERPL-MCNC: 369 MG/DL (ref 0–150)
TSH SERPL DL<=0.005 MIU/L-ACNC: 2.21 MIU/ML (ref 0.27–4.2)
VIT B12 SERPL-MCNC: 965 PG/ML (ref 211–946)
VLDLC SERPL CALC-MCNC: 73.8 MG/DL

## 2019-08-12 ENCOUNTER — OFFICE VISIT (OUTPATIENT)
Dept: ENDOCRINOLOGY | Age: 59
End: 2019-08-12

## 2019-08-12 VITALS
DIASTOLIC BLOOD PRESSURE: 70 MMHG | OXYGEN SATURATION: 98 % | SYSTOLIC BLOOD PRESSURE: 110 MMHG | BODY MASS INDEX: 28.63 KG/M2 | WEIGHT: 216 LBS | HEART RATE: 77 BPM | HEIGHT: 73 IN

## 2019-08-12 DIAGNOSIS — R73.03 PRE-DIABETES: ICD-10-CM

## 2019-08-12 DIAGNOSIS — R53.82 CHRONIC FATIGUE: ICD-10-CM

## 2019-08-12 DIAGNOSIS — E03.9 ACQUIRED HYPOTHYROIDISM: Primary | ICD-10-CM

## 2019-08-12 PROCEDURE — 99214 OFFICE O/P EST MOD 30 MIN: CPT | Performed by: INTERNAL MEDICINE

## 2019-08-12 NOTE — PROGRESS NOTES
59 y.o.    Patient Care Team:  Cesar Hoover MD as PCP - General  Cesar Hoover MD as PCP - Family Medicine    Chief Complaint:    FOLLOW UP/ HYPOTHYROIDISM  Subjective     HPI    59-year-old white male is here as a follow up for the management of hypothyroidism, hypogonadism.     Hypothyroidism  Today in the clinic patient reports that he is on levothyroxine 100 mcg oral daily.  He is compliant with the medication.    He does complain of feeling tired, this could be related to his fibromyalgia, loss of his son and mother.  He reports that he is still grieving the losses.  His weight has been relatively stable.  Sleep is disturbed due to the pain.  Alternating heat and cold intolerance.  Tremors at times, no racing of heart or eye symptoms.  Does have history of orthostatic hypotension.  Does have family history of thyroid disease.     Hyperlipidemia- not on any medications      Borderline diabetes   Patient went for diabetic education and has been educated on diet changes and exercise regimen.      Fibromyalgia  Being followed by pain specialist     Reviewed primary care physician's/consulting physician documentation and lab results :     Interval History      The following portions of the patient's history were reviewed and updated as appropriate: allergies, current medications, past family history, past medical history, past social history, past surgical history and problem list.    Past Medical History:   Diagnosis Date   • Abdominal pain     Recurrent sx and follows with Dr. Heard.   • Abscess of buttock    • Acute gastritis    • Allergic rhinitis    • Alopecia    • Angina pectoris (CMS/HCC)    • Anxiety    • Arteriosclerotic coronary artery disease    • Benign prostatic hypertrophy (BPH) with nocturia    • Calf tenderness    • Cervical neck pain with evidence of disc disease    • DDD (degenerative disc disease), lumbar    • Degeneration of cervical intervertebral disc    • Depression    • Esophageal  reflux     Follows with Dr. Jackson   • Fatigue    • Fever 04/2015    Low grade symptoms of fever for 3 weeks.   • Fibromyalgia    • Gastroparesis    • Health care maintenance    • Heart palpitations    • History of deep vein thrombophlebitis of lower extremity 2003    DVT Dx 2003   • Hypokalemia    • Idiopathic peripheral neuropathy    • Impaired fasting glucose    • Insomnia    • Lichen planus     tongue   • LOM (loss of memory)    • Mental status change     Episodes where he stares ahead and have some shaking of his limbs.  Possible seizure activity.  Referred to Dr. Santana, October 2, 2013.   • Nausea    • Onychomycosis of toenail    • Osteoarthritis of hip    • PN (peripheral neuropathy)    • Primary hypothyroidism    • Rash    • Sleep apnea    • Tendonitis of left hip    • Tendonitis of right hip    • TIA (transient ischemic attack) 06/2016   • Tinea versicolor      Family History   Problem Relation Age of Onset   • Heart failure Mother         Congestive   • Heart disease Mother    • Atrial fibrillation Mother    • Alcohol abuse Paternal Uncle    • Heart failure Paternal Grandmother         Congestive   • No Known Problems Father    • No Known Problems Sister    • No Known Problems Brother    • Heart failure Maternal Grandmother    • Heart disease Maternal Grandmother    • No Known Problems Maternal Grandfather    • No Known Problems Paternal Grandfather      Social History     Socioeconomic History   • Marital status:      Spouse name: Not on file   • Number of children: 2   • Years of education: Not on file   • Highest education level: Not on file   Occupational History   • Occupation: Retired   Tobacco Use   • Smoking status: Never Smoker   • Smokeless tobacco: Former User     Types: Chew   Substance and Sexual Activity   • Alcohol use: No   • Drug use: No   • Sexual activity: Defer     Allergies   Allergen Reactions   • Butorphanol    • Hydroxyzine    • Iodinated Diagnostic Agents    • Iodine    •  Other      Vistra 650 Tabs   • Phenytoin    • Amoxicillin Rash   • Clindamycin/Lincomycin Rash       Current Outpatient Medications:   •  escitalopram (LEXAPRO) 20 MG tablet, Take 1 tablet by mouth daily., Disp: , Rfl:   •  fluticasone (FLONASE) 50 MCG/ACT nasal spray, 1 spray into each nostril 2 (two) times a day. Administer 2 sprays in each nostril for each dose., Disp: 3 each, Rfl: 3  •  gabapentin (NEURONTIN) 600 MG tablet, Take 1 tablet by mouth 4 (Four) Times a Day., Disp: , Rfl:   •  levothyroxine (SYNTHROID, LEVOTHROID) 100 MCG tablet, Take 1 tablet by mouth Daily., Disp: 90 tablet, Rfl: 3  •  lisdexamfetamine (VYVANSE) 70 MG capsule, Take 1 capsule by mouth every morning., Disp: , Rfl:   •  methadone (DOLOPHINE) 5 MG tablet, Take 5 mg by mouth 2 (Two) Times a Day., Disp: , Rfl:   •  nortriptyline (PAMELOR) 50 MG capsule, Take 50 mg by mouth Every Night., Disp: , Rfl:   •  ondansetron (ZOFRAN) 4 MG tablet, Take 4 mg by mouth every 6 (six) hours as needed for nausea or vomiting. Take for nausea and vomiting., Disp: , Rfl:   •  Sennosides (SENNA) 8.6 MG capsule, Take 1 capsule by mouth Daily., Disp: , Rfl:   •  sucralfate (CARAFATE) 1 GM/10ML suspension, Take 10 mL by mouth 4 (four) times a day as needed., Disp: , Rfl:   •  tamsulosin (FLOMAX) 0.4 MG capsule 24 hr capsule, Take 1 capsule by mouth daily., Disp: , Rfl:   •  Turmeric 450 MG capsule, Take 1 capsule by mouth Daily., Disp: , Rfl:   •  EVZIO 2 MG/0.4ML solution auto-injector, , Disp: , Rfl:   •  Unable to find, Domperidone POWD; 1 tablet twice daily., Disp: , Rfl:         Review of Systems   Constitutional: Positive for fatigue. Negative for appetite change and fever.   Eyes: Negative for visual disturbance.   Respiratory: Positive for shortness of breath.    Cardiovascular: Positive for leg swelling. Negative for palpitations.   Gastrointestinal: Negative for abdominal pain and vomiting.   Endocrine: Negative for polydipsia and polyuria.  "  Musculoskeletal: Negative for joint swelling and neck pain.   Skin: Negative for rash.   Neurological: Negative for weakness and numbness.   Psychiatric/Behavioral: Negative for behavioral problems.       Objective       Vitals:    08/12/19 0953   BP: 110/70   Pulse: 77   SpO2: 98%   Weight: 98 kg (216 lb)   Height: 185.4 cm (73\")     Body mass index is 28.5 kg/m².      Physical Exam   Constitutional: He is oriented to person, place, and time. He appears well-nourished.   HENT:   Head: Normocephalic and atraumatic.   Eyes: Conjunctivae and EOM are normal. No scleral icterus.   Neck: No thyromegaly present.   Cardiovascular: Normal rate and regular rhythm.   Pulmonary/Chest: Effort normal and breath sounds normal. No stridor. No respiratory distress. He has no wheezes.   Abdominal: Soft. Bowel sounds are normal. He exhibits no distension. There is no tenderness.   Musculoskeletal: He exhibits no edema or deformity.   Lymphadenopathy:     He has no cervical adenopathy.   Neurological: He is alert and oriented to person, place, and time.   Skin: Skin is warm and dry. No rash noted. He is not diaphoretic.   Psychiatric: He has a normal mood and affect.   Vitals reviewed.    Results Review:     I reviewed the patient's new clinical results and mentioned them above in HPI and in plan as well.    Medical records reviewed  Summary:done      Lab on 07/29/2019   Component Date Value Ref Range Status   • 25 Hydroxy, Vitamin D 07/29/2019 54.5  30.0 - 100.0 ng/ml Final    Comment: Reference Range for Total Vitamin D 25(OH)  Deficiency <20.0 ng/mL  Insufficiency 21-29 ng/mL  Sufficiency  ng/mL  Toxicity >100 ng/ml     • Vitamin B-12 07/29/2019 965* 211 - 946 pg/mL Final   • Folate 07/29/2019 >20.00  4.78 - 24.20 ng/mL Final   • Total Cholesterol 07/29/2019 162  0 - 200 mg/dL Final   • Triglycerides 07/29/2019 369* 0 - 150 mg/dL Final   • HDL Cholesterol 07/29/2019 36* 40 - 60 mg/dL Final   • VLDL Cholesterol 07/29/2019 " 73.8  mg/dL Final   • LDL Cholesterol  07/29/2019 52  0 - 100 mg/dL Final   • Glucose 07/29/2019 96  65 - 99 mg/dL Final   • BUN 07/29/2019 10  8 - 23 mg/dL Final   • Creatinine 07/29/2019 1.34* 0.76 - 1.27 mg/dL Final   • eGFR Non African Am 07/29/2019 54* >60 mL/min/1.73 Final   • eGFR African Am 07/29/2019 66  >60 mL/min/1.73 Final   • BUN/Creatinine Ratio 07/29/2019 7.5  7.0 - 25.0 Final   • Sodium 07/29/2019 143  136 - 145 mmol/L Final   • Potassium 07/29/2019 3.9  3.5 - 5.2 mmol/L Final   • Chloride 07/29/2019 102  98 - 107 mmol/L Final   • Total CO2 07/29/2019 29.2* 22.0 - 29.0 mmol/L Final   • Calcium 07/29/2019 9.2  8.6 - 10.5 mg/dL Final   • Hemoglobin A1C 07/29/2019 6.00* 4.80 - 5.60 % Final    Comment: Hemoglobin A1C Ranges:  Increased Risk for Diabetes  5.7% to 6.4%  Diabetes                     >= 6.5%  Diabetic Goal                < 7.0%     • Free T4 07/29/2019 1.29  0.93 - 1.70 ng/dL Final   • TSH 07/29/2019 2.210  0.270 - 4.200 mIU/mL Final   • Interpretation 07/29/2019 Note   Final    Supplemental report is available.   • PDF Image 07/29/2019 Not applicable   Final     Lab Results   Component Value Date    HGBA1C 6.00 (H) 07/29/2019    HGBA1C 5.60 02/04/2019    HGBA1C 5.70 (H) 11/28/2018     Lab Results   Component Value Date    CREATININE 1.34 (H) 07/29/2019     Imaging Results (most recent)     None                Assessment and Plan:    Joel was seen today for hypothyroidism.    Diagnoses and all orders for this visit:    Acquired hypothyroidism  -     Hemoglobin A1c; Future  -     Basic Metabolic Panel; Future  -     Lipid Panel; Future  -     Vitamin D 25 Hydroxy; Future  -     Vitamin B12 & Folate; Future  -     TSH; Future  -     T4, Free; Future    Chronic fatigue  -     Hemoglobin A1c; Future  -     Basic Metabolic Panel; Future  -     Lipid Panel; Future  -     Vitamin D 25 Hydroxy; Future  -     Vitamin B12 & Folate; Future  -     TSH; Future  -     T4, Free; Future    Pre-diabetes  -  "    Hemoglobin A1c; Future  -     Basic Metabolic Panel; Future  -     Lipid Panel; Future  -     Vitamin D 25 Hydroxy; Future  -     Vitamin B12 & Folate; Future  -     TSH; Future  -     T4, Free; Future      Hypothyroidism  Levels are stable  Continue levothyroxine 100 mcg oral daily  Patient would look into his medication and see if he would want to be on Synthroid instead of the levothyroxine.    Prediabetes  HbA1c is worse  Discussed with the patient that if his A1c continues to get worse we might have to consider starting oral hypoglycemic agents.    Chronic fatigue  Multifactorial.      Reviewed Lab results with the patient.             Janelle Serna MD  08/12/19    EMR Dragon / transcription disclaimer:     \"Dictated utilizing Dragon dictation\".  "

## 2019-08-14 ENCOUNTER — OFFICE VISIT (OUTPATIENT)
Dept: CARDIOLOGY | Facility: CLINIC | Age: 59
End: 2019-08-14

## 2019-08-14 VITALS
BODY MASS INDEX: 27.46 KG/M2 | SYSTOLIC BLOOD PRESSURE: 112 MMHG | DIASTOLIC BLOOD PRESSURE: 62 MMHG | HEART RATE: 88 BPM | OXYGEN SATURATION: 99 % | WEIGHT: 214 LBS | HEIGHT: 74 IN

## 2019-08-14 DIAGNOSIS — I25.10 CHRONIC CORONARY ARTERY DISEASE: Primary | ICD-10-CM

## 2019-08-14 PROCEDURE — 93000 ELECTROCARDIOGRAM COMPLETE: CPT | Performed by: PHYSICIAN ASSISTANT

## 2019-08-14 PROCEDURE — 99213 OFFICE O/P EST LOW 20 MIN: CPT | Performed by: PHYSICIAN ASSISTANT

## 2019-08-14 NOTE — PROGRESS NOTES
Date of Office Visit: 2019  Encounter Provider: ELIAS Kearney  Place of Service: [facility] CARDIOLOGY  Patient Name: Joel Keating  :1960    Chief Complaint   Patient presents with   • Coronary Artery Disease     1 year follow up   :     HPI: Joel Keating is a 59 y.o. male who presents today for follow-up.  Old records have been obtained and reviewed by me.  He is a patient of Dr. Becker who is well-known to me.  He has mild coronary disease and a small myocardial bridge in his LAD.  His main issue is been syncopal episodes.  We did have a ZIO patch on him at one point, he did have a syncopal episode while wearing the patch however it did not correlate to any ECG abnormalities.  We have felt this is been secondary to orthostatic hypotension.  He also has issues with fibromyalgia and narcotic use, he sees a physician for chronic pain and has used methadone in the past.  He was last in our office to see me on 2018.  At that visit he was stable from a cardiac standpoint but was dealing with some very emotional situations related to his family.  His 33-year-old son had committed suicide and his mother had recently been diagnosed with breast cancer.  At one point he was seeing a natural pathic doctor for his pain and this had helped him however he had not been back in quite some time.  I encouraged him to go back to see his naturopathic doctor.  From a cardiac standpoint he was stable and I did not make any changes.  He is here today for yearly visit.   From a cardiac standpoint he stable.  Unfortunately his mother did pass away in April of this year and now he is left taking care of his father.  This is a big task for him.  His father evidently is very sick and noncompliant with medical therapy.  The patient himself has his chronic pain issues and stress, but from a cardiac standpoint he stable.  He denies any worsening symptoms of shortness of breath, chest pain,  palpitations, edema, dizziness, or syncope.      Past Medical History:   Diagnosis Date   • Abdominal pain     Recurrent sx and follows with Dr. Heard.   • Abscess of buttock    • Acute gastritis    • Allergic rhinitis    • Alopecia    • Angina pectoris (CMS/HCC)    • Anxiety    • Arteriosclerotic coronary artery disease    • Benign prostatic hypertrophy (BPH) with nocturia    • Calf tenderness    • Cervical neck pain with evidence of disc disease    • DDD (degenerative disc disease), lumbar    • Degeneration of cervical intervertebral disc    • Depression    • Esophageal reflux     Follows with Dr. Jackson   • Fatigue    • Fever 04/2015    Low grade symptoms of fever for 3 weeks.   • Fibromyalgia    • Gastroparesis    • Health care maintenance    • Heart palpitations    • History of deep vein thrombophlebitis of lower extremity 2003    DVT Dx 2003   • Hypokalemia    • Idiopathic peripheral neuropathy    • Impaired fasting glucose    • Insomnia    • Lichen planus     tongue   • LOM (loss of memory)    • Mental status change     Episodes where he stares ahead and have some shaking of his limbs.  Possible seizure activity.  Referred to Dr. Santana, October 2, 2013.   • Nausea    • Onychomycosis of toenail    • Osteoarthritis of hip    • PN (peripheral neuropathy)    • Primary hypothyroidism    • Rash    • Sleep apnea    • Tendonitis of left hip    • Tendonitis of right hip    • TIA (transient ischemic attack) 06/2016   • Tinea versicolor        Past Surgical History:   Procedure Laterality Date   • APPENDECTOMY     • BACK SURGERY      Cervical   • CHOLECYSTECTOMY     • JOINT REPLACEMENT Bilateral     Hip   • LUMBAR FUSION     • SURAL NERVE/MUSCLE BIOPSY  04/2016   • TONSILLECTOMY         Social History     Socioeconomic History   • Marital status:      Spouse name: Not on file   • Number of children: 2   • Years of education: Not on file   • Highest education level: Not on file   Occupational History   •  Occupation: Retired   Tobacco Use   • Smoking status: Never Smoker   • Smokeless tobacco: Former User     Types: Chew   Substance and Sexual Activity   • Alcohol use: No   • Drug use: No   • Sexual activity: Defer       Family History   Problem Relation Age of Onset   • Heart failure Mother         Congestive   • Heart disease Mother    • Atrial fibrillation Mother    • Alcohol abuse Paternal Uncle    • Heart failure Paternal Grandmother         Congestive   • No Known Problems Father    • No Known Problems Sister    • No Known Problems Brother    • Heart failure Maternal Grandmother    • Heart disease Maternal Grandmother    • No Known Problems Maternal Grandfather    • No Known Problems Paternal Grandfather        Review of Systems   Constitution: Negative for chills, fever and malaise/fatigue.   Cardiovascular: Negative for chest pain, dyspnea on exertion, leg swelling, near-syncope, orthopnea, palpitations, paroxysmal nocturnal dyspnea and syncope.   Respiratory: Negative for cough and shortness of breath.    Musculoskeletal: Negative for joint pain, joint swelling and myalgias.        Chronic pain from fibromyalgia   Gastrointestinal: Negative for abdominal pain, diarrhea, melena, nausea and vomiting.   Genitourinary: Negative for frequency and hematuria.   Neurological: Negative for light-headedness, numbness, paresthesias and seizures.   Allergic/Immunologic: Negative.    All other systems reviewed and are negative.      Allergies   Allergen Reactions   • Butorphanol    • Hydroxyzine    • Iodinated Diagnostic Agents    • Iodine    • Other      Vistra 650 Tabs   • Phenytoin    • Amoxicillin Rash   • Clindamycin/Lincomycin Rash         Current Outpatient Medications:   •  escitalopram (LEXAPRO) 20 MG tablet, Take 1 tablet by mouth daily., Disp: , Rfl:   •  EVZIO 2 MG/0.4ML solution auto-injector, As Needed., Disp: , Rfl:   •  fluticasone (FLONASE) 50 MCG/ACT nasal spray, 1 spray into each nostril 2 (two) times  "a day. Administer 2 sprays in each nostril for each dose., Disp: 3 each, Rfl: 3  •  gabapentin (NEURONTIN) 600 MG tablet, Take 1 tablet by mouth 4 (Four) Times a Day., Disp: , Rfl:   •  levothyroxine (SYNTHROID, LEVOTHROID) 100 MCG tablet, Take 1 tablet by mouth Daily., Disp: 90 tablet, Rfl: 3  •  lisdexamfetamine (VYVANSE) 70 MG capsule, Take 1 capsule by mouth every morning., Disp: , Rfl:   •  methadone (DOLOPHINE) 5 MG tablet, Take 5 mg by mouth 2 (Two) Times a Day., Disp: , Rfl:   •  nortriptyline (PAMELOR) 50 MG capsule, Take 50 mg by mouth Every Night., Disp: , Rfl:   •  ondansetron (ZOFRAN) 4 MG tablet, Take 4 mg by mouth every 6 (six) hours as needed for nausea or vomiting. Take for nausea and vomiting., Disp: , Rfl:   •  Sennosides (SENNA) 8.6 MG capsule, Take 1 capsule by mouth Daily., Disp: , Rfl:   •  sucralfate (CARAFATE) 1 GM/10ML suspension, Take 10 mL by mouth 4 (four) times a day as needed., Disp: , Rfl:   •  tamsulosin (FLOMAX) 0.4 MG capsule 24 hr capsule, Take 1 capsule by mouth daily., Disp: , Rfl:   •  Turmeric 450 MG capsule, Take 1 capsule by mouth Daily., Disp: , Rfl:   •  Unable to find, Domperidone POWD; 1 tablet twice daily., Disp: , Rfl:       Objective:     Vitals:    08/14/19 1226 08/14/19 1238   BP: 110/64 112/62   BP Location: Right arm Left arm   Pulse: 88    SpO2: 99%    Weight: 97.1 kg (214 lb)    Height: 188 cm (74\")      Body mass index is 27.48 kg/m².    PHYSICAL EXAM:    Physical Exam   Constitutional: He is oriented to person, place, and time. He appears well-developed and well-nourished. No distress.   HENT:   Head: Normocephalic and atraumatic.   Eyes: Pupils are equal, round, and reactive to light.   Neck: No JVD present. No thyromegaly present.   Cardiovascular: Normal rate, regular rhythm, normal heart sounds and intact distal pulses.   No murmur heard.  Pulmonary/Chest: Effort normal and breath sounds normal. No respiratory distress.   Abdominal: Soft. Bowel sounds are " normal. He exhibits no distension. There is no splenomegaly or hepatomegaly. There is no tenderness.   Musculoskeletal: Normal range of motion. He exhibits no edema.   Neurological: He is alert and oriented to person, place, and time.   Skin: Skin is warm and dry. He is not diaphoretic. No erythema.   Psychiatric: He has a normal mood and affect. His behavior is normal. Judgment normal.         ECG 12 Lead  Date/Time: 8/14/2019 12:46 PM  Performed by: Selina Villafuerte PA  Authorized by: Selina Villafuerte PA   Comparison: compared with previous ECG from 7/27/2018  Similar to previous ECG  Rhythm: sinus rhythm  BPM: 86    Clinical impression: normal ECG  Comments: Indication: Coronary disease              Assessment:       Diagnosis Plan   1. Chronic coronary artery disease  ECG 12 Lead     Orders Placed This Encounter   Procedures   • ECG 12 Lead     This order was created via procedure documentation          Plan:       From a cardiac standpoint he stable.  He has mild coronary disease and a small myocardial bridge on his LAD.  His main issue is chronic pain and fibromyalgia as well as lots of stress and anxiety.  He also has some neurological issues and follows regularly with a neurologist.  I am not going to make any changes to his medical regimen, and he will follow-up in 1 year with Dr. Johnson as a transference of care from Dr. Ventura.    As always, it has been a pleasure to participate in your patient's care.      Sincerely,         Selina Villafuerte PA-C

## 2019-08-20 RX ORDER — LEVOTHYROXINE SODIUM 100 MCG
100 TABLET ORAL DAILY
Qty: 90 TABLET | Refills: 1 | OUTPATIENT
Start: 2019-08-20 | End: 2019-09-20

## 2019-09-26 ENCOUNTER — APPOINTMENT (OUTPATIENT)
Dept: GENERAL RADIOLOGY | Facility: HOSPITAL | Age: 59
End: 2019-09-26

## 2019-09-26 PROCEDURE — 71046 X-RAY EXAM CHEST 2 VIEWS: CPT | Performed by: EMERGENCY MEDICINE

## 2019-09-27 ENCOUNTER — OFFICE VISIT (OUTPATIENT)
Dept: INTERNAL MEDICINE | Facility: CLINIC | Age: 59
End: 2019-09-27

## 2019-09-27 VITALS
TEMPERATURE: 99.3 F | HEART RATE: 90 BPM | DIASTOLIC BLOOD PRESSURE: 64 MMHG | SYSTOLIC BLOOD PRESSURE: 104 MMHG | BODY MASS INDEX: 27.51 KG/M2 | WEIGHT: 214.3 LBS | OXYGEN SATURATION: 96 %

## 2019-09-27 DIAGNOSIS — J40 BRONCHITIS: Primary | ICD-10-CM

## 2019-09-27 PROCEDURE — 99213 OFFICE O/P EST LOW 20 MIN: CPT | Performed by: FAMILY MEDICINE

## 2019-09-27 RX ORDER — TIZANIDINE HYDROCHLORIDE 4 MG/1
4 CAPSULE, GELATIN COATED ORAL DAILY PRN
COMMUNITY
Start: 2019-08-06 | End: 2020-08-17

## 2019-09-27 RX ORDER — CYCLOBENZAPRINE HCL 10 MG
10 TABLET ORAL DAILY PRN
COMMUNITY
Start: 2019-08-26 | End: 2021-05-17

## 2019-09-27 NOTE — PROGRESS NOTES
Joel Keating is a 59 y.o. male.      Assessment/Plan   Problem List Items Addressed This Visit        Respiratory    Bronchitis - Primary         Continue present treatment of bronchitis as prescribed albuterol azithromycin prednisone over-the-counter cough suppressant plenty of fluids suspect symptoms should improve over the next couple days follow-up if no improvement    Return if symptoms worsen or fail to improve, for Recheck, Next scheduled follow up.      Chief Complaint   Patient presents with   • [facility] follow up to bronchitis/ear infection     Social History     Tobacco Use   • Smoking status: Never Smoker   • Smokeless tobacco: Former User     Types: Chew   Substance Use Topics   • Alcohol use: No   • Drug use: No       History of Present Illness   Patient follows up appointment for acute problem of ear infection bilaterally cough and bronchitis seen in urgent care twice in the last week initially given Levaquin for 4 days then azithromycin presently taking with prednisone cough medicine and albuterol metered-dose inhaler he still having cough low-grade fever ears are improved  The following portions of the patient's history were reviewed and updated as appropriate:PMHroutine: Social history , Allergies, Current Medications, Active Problem List and Health Maintenance    Review of Systems   Constitutional: Positive for fatigue. Negative for activity change and unexpected weight change.   HENT: Positive for congestion, postnasal drip and sore throat. Negative for ear pain.    Eyes: Negative for pain and discharge.   Respiratory: Positive for cough. Negative for chest tightness, shortness of breath and wheezing.    Cardiovascular: Negative for chest pain and palpitations.   Gastrointestinal: Negative for abdominal pain, diarrhea and vomiting.   Endocrine: Negative.    Genitourinary: Negative.    Musculoskeletal: Negative for joint swelling.   Skin: Negative for color change, rash and wound.    Allergic/Immunologic: Negative.    Neurological: Negative for seizures and syncope.   Psychiatric/Behavioral: Negative.        Objective   Vitals:    09/27/19 1246   BP: 104/64   BP Location: Right arm   Patient Position: Sitting   Cuff Size: Adult   Pulse: 90   Temp: 99.3 °F (37.4 °C)   TempSrc: Oral   SpO2: 96%   Weight: 97.2 kg (214 lb 4.8 oz)     Body mass index is 27.51 kg/m².  Physical Exam   Constitutional: He is oriented to person, place, and time. He appears well-developed and well-nourished.  Non-toxic appearance. No distress.   HENT:   Head: Normocephalic and atraumatic. Hair is normal.   Right Ear: External ear normal. No drainage, swelling or tenderness. Tympanic membrane is retracted.   Left Ear: External ear normal. No drainage, swelling or tenderness. Tympanic membrane is retracted.   Nose: Mucosal edema present. No epistaxis.   Mouth/Throat: Uvula is midline and mucous membranes are normal. No oral lesions. No uvula swelling. Posterior oropharyngeal erythema present. No oropharyngeal exudate.   Eyes: Conjunctivae and EOM are normal. Pupils are equal, round, and reactive to light. Right eye exhibits no discharge. Left eye exhibits no discharge. No scleral icterus.   Neck: Normal range of motion. Neck supple.   Cardiovascular: Normal rate, regular rhythm and normal heart sounds. Exam reveals no gallop.   No murmur heard.  Pulmonary/Chest: Breath sounds normal. No stridor. No respiratory distress. He has no wheezes. He has no rales. He exhibits no tenderness.   Abdominal: Soft. There is no tenderness.   Lymphadenopathy:     He has cervical adenopathy.   Neurological: He is alert and oriented to person, place, and time. He exhibits normal muscle tone.   Skin: Skin is warm and dry. No rash noted. He is not diaphoretic.   Psychiatric: He has a normal mood and affect. His behavior is normal. Judgment and thought content normal.   Nursing note and vitals reviewed.  Chest x-ray no evidence of acute  process  Reviewed Data:  No visits with results within 1 Month(s) from this visit.   Latest known visit with results is:   Lab on 07/29/2019   Component Date Value Ref Range Status   • 25 Hydroxy, Vitamin D 07/29/2019 54.5  30.0 - 100.0 ng/ml Final    Comment: Reference Range for Total Vitamin D 25(OH)  Deficiency <20.0 ng/mL  Insufficiency 21-29 ng/mL  Sufficiency  ng/mL  Toxicity >100 ng/ml     • Vitamin B-12 07/29/2019 965* 211 - 946 pg/mL Final   • Folate 07/29/2019 >20.00  4.78 - 24.20 ng/mL Final   • Total Cholesterol 07/29/2019 162  0 - 200 mg/dL Final   • Triglycerides 07/29/2019 369* 0 - 150 mg/dL Final   • HDL Cholesterol 07/29/2019 36* 40 - 60 mg/dL Final   • VLDL Cholesterol 07/29/2019 73.8  mg/dL Final   • LDL Cholesterol  07/29/2019 52  0 - 100 mg/dL Final   • Glucose 07/29/2019 96  65 - 99 mg/dL Final   • BUN 07/29/2019 10  8 - 23 mg/dL Final   • Creatinine 07/29/2019 1.34* 0.76 - 1.27 mg/dL Final   • eGFR Non African Am 07/29/2019 54* >60 mL/min/1.73 Final   • eGFR African Am 07/29/2019 66  >60 mL/min/1.73 Final   • BUN/Creatinine Ratio 07/29/2019 7.5  7.0 - 25.0 Final   • Sodium 07/29/2019 143  136 - 145 mmol/L Final   • Potassium 07/29/2019 3.9  3.5 - 5.2 mmol/L Final   • Chloride 07/29/2019 102  98 - 107 mmol/L Final   • Total CO2 07/29/2019 29.2* 22.0 - 29.0 mmol/L Final   • Calcium 07/29/2019 9.2  8.6 - 10.5 mg/dL Final   • Hemoglobin A1C 07/29/2019 6.00* 4.80 - 5.60 % Final    Comment: Hemoglobin A1C Ranges:  Increased Risk for Diabetes  5.7% to 6.4%  Diabetes                     >= 6.5%  Diabetic Goal                < 7.0%     • Free T4 07/29/2019 1.29  0.93 - 1.70 ng/dL Final   • TSH 07/29/2019 2.210  0.270 - 4.200 mIU/mL Final   • Interpretation 07/29/2019 Note   Final    Supplemental report is available.   • PDF Image 07/29/2019 Not applicable   Final

## 2019-10-15 ENCOUNTER — TELEPHONE (OUTPATIENT)
Dept: INTERNAL MEDICINE | Facility: CLINIC | Age: 59
End: 2019-10-15

## 2019-10-15 NOTE — TELEPHONE ENCOUNTER
"Patient states he has had a fever for a month, falling this week about 3 times, he is \"seeing snow\" and he is having chest pressure but patient said it's not a heart attack, he knows what that is. I advised him to go to the ER ASAP and he refused.   "

## 2019-10-16 ENCOUNTER — OFFICE VISIT (OUTPATIENT)
Dept: INTERNAL MEDICINE | Facility: CLINIC | Age: 59
End: 2019-10-16

## 2019-10-16 ENCOUNTER — APPOINTMENT (OUTPATIENT)
Dept: LAB | Facility: HOSPITAL | Age: 59
End: 2019-10-16

## 2019-10-16 VITALS
WEIGHT: 214.5 LBS | HEIGHT: 74 IN | SYSTOLIC BLOOD PRESSURE: 102 MMHG | BODY MASS INDEX: 27.53 KG/M2 | TEMPERATURE: 99 F | HEART RATE: 74 BPM | DIASTOLIC BLOOD PRESSURE: 50 MMHG | OXYGEN SATURATION: 97 %

## 2019-10-16 DIAGNOSIS — R50.9 FEVER AND CHILLS: Primary | ICD-10-CM

## 2019-10-16 PROBLEM — J40 BRONCHITIS: Status: RESOLVED | Noted: 2017-08-14 | Resolved: 2019-10-16

## 2019-10-16 LAB
BASOPHILS # BLD AUTO: 0.02 10*3/MM3 (ref 0–0.2)
BASOPHILS NFR BLD AUTO: 0.4 % (ref 0–1.5)
DEPRECATED RDW RBC AUTO: 43.4 FL (ref 37–54)
EOSINOPHIL # BLD AUTO: 0.2 10*3/MM3 (ref 0–0.4)
EOSINOPHIL NFR BLD AUTO: 3.6 % (ref 0.3–6.2)
ERYTHROCYTE [DISTWIDTH] IN BLOOD BY AUTOMATED COUNT: 12.7 % (ref 12.3–15.4)
HCT VFR BLD AUTO: 41 % (ref 37.5–51)
HGB BLD-MCNC: 14.3 G/DL (ref 13–17.7)
IMM GRANULOCYTES # BLD AUTO: 0.01 10*3/MM3 (ref 0–0.05)
IMM GRANULOCYTES NFR BLD AUTO: 0.2 % (ref 0–0.5)
LYMPHOCYTES # BLD AUTO: 1.94 10*3/MM3 (ref 0.7–3.1)
LYMPHOCYTES NFR BLD AUTO: 35.3 % (ref 19.6–45.3)
MCH RBC QN AUTO: 33.1 PG (ref 26.6–33)
MCHC RBC AUTO-ENTMCNC: 34.9 G/DL (ref 31.5–35.7)
MCV RBC AUTO: 94.9 FL (ref 79–97)
MONOCYTES # BLD AUTO: 0.39 10*3/MM3 (ref 0.1–0.9)
MONOCYTES NFR BLD AUTO: 7.1 % (ref 5–12)
NEUTROPHILS # BLD AUTO: 2.94 10*3/MM3 (ref 1.7–7)
NEUTROPHILS NFR BLD AUTO: 53.4 % (ref 42.7–76)
NRBC BLD AUTO-RTO: 0 /100 WBC (ref 0–0.2)
PLATELET # BLD AUTO: 169 10*3/MM3 (ref 140–450)
PMV BLD AUTO: 9.1 FL (ref 6–12)
RBC # BLD AUTO: 4.32 10*6/MM3 (ref 4.14–5.8)
WBC NRBC COR # BLD: 5.5 10*3/MM3 (ref 3.4–10.8)

## 2019-10-16 PROCEDURE — 87040 BLOOD CULTURE FOR BACTERIA: CPT | Performed by: FAMILY MEDICINE

## 2019-10-16 PROCEDURE — 36415 COLL VENOUS BLD VENIPUNCTURE: CPT | Performed by: FAMILY MEDICINE

## 2019-10-16 PROCEDURE — 85025 COMPLETE CBC W/AUTO DIFF WBC: CPT | Performed by: FAMILY MEDICINE

## 2019-10-16 PROCEDURE — 99214 OFFICE O/P EST MOD 30 MIN: CPT | Performed by: FAMILY MEDICINE

## 2019-10-16 RX ORDER — GABAPENTIN 600 MG/1
TABLET ORAL
COMMUNITY
Start: 2019-10-13

## 2019-10-16 NOTE — PROGRESS NOTES
"Joel Keating is a 59 y.o. male.      Assessment/Plan   Problem List Items Addressed This Visit        Other    Fever and chills - Primary    Overview     Description: 4/15 low grade symptoms of fever for 3 weeks.         Relevant Orders    CBC & Differential    Blood Culture - Blood, Arm, Right    Blood Culture - Blood, Arm, Right    CBC Auto Differential         New problem with additional work-up  Follow-up results of CBC and blood culture for evaluation of fever  Return in about 1 week (around 10/23/2019), or if symptoms worsen or fail to improve, for Recheck, Next scheduled follow up.  25 minutes was spent face-to-face with patient with greater than 50% of the time discussing disease pathology progression review of records    Chief Complaint   Patient presents with   • fever for a month     chest \"burns\" - saw cardiology in August   • blacked out     fatigue   • fallen 3 times   • Headache   • sees \"snow\"     Social History     Tobacco Use   • Smoking status: Never Smoker   • Smokeless tobacco: Former User     Types: Chew   Substance Use Topics   • Alcohol use: No   • Drug use: No     Problem  Fever    This is a recurrent problem. The current episode started more than 1 month ago. The problem occurs daily. The problem has been unchanged. Maximum temperature: . The temperature was taken using an oral thermometer. Associated symptoms include congestion and sleepiness. Pertinent negatives include no abdominal pain, chest pain, coughing, diarrhea, ear pain, headaches, muscle aches, nausea, rash, sore throat, urinary pain, vomiting or wheezing. He has tried acetaminophen for the symptoms. The treatment provided mild relief.   Risk factors: recent sickness    Risk factors: no contaminated food, no contaminated water, no hx of cancer, no immunosuppression, no occupational exposure, no recent travel and no sick contacts         The following portions of the patient's history were reviewed and updated as " "appropriate:PMHroutine: Social history , Allergies, Current Medications, Active Problem List and Health Maintenance    Review of Systems   Constitutional: Positive for fever.   HENT: Positive for congestion. Negative for ear pain and sore throat.    Respiratory: Negative for cough and wheezing.    Cardiovascular: Negative for chest pain.   Gastrointestinal: Negative for abdominal pain, diarrhea, nausea and vomiting.   Genitourinary: Negative for dysuria.   Skin: Negative for rash.   Neurological: Negative for headaches.       Objective   Vitals:    10/16/19 1035   BP: 102/50   BP Location: Right arm   Patient Position: Sitting   Cuff Size: Adult   Pulse: 74   Temp: 99 °F (37.2 °C)   TempSrc: Oral   SpO2: 97%   Weight: 97.3 kg (214 lb 8 oz)   Height: 188 cm (74\")     Body mass index is 27.54 kg/m².  Physical Exam   Constitutional: He is oriented to person, place, and time. He appears well-developed and well-nourished. No distress.   HENT:   Head: Normocephalic and atraumatic.   Right Ear: External ear normal.   Left Ear: External ear normal.   Mouth/Throat: Oropharynx is clear and moist.   Eyes: Conjunctivae and EOM are normal. Pupils are equal, round, and reactive to light. Right eye exhibits no discharge. Left eye exhibits no discharge. No scleral icterus.   Neck: Normal range of motion. Neck supple. No tracheal deviation present. No thyromegaly present.   Cardiovascular: Normal rate, regular rhythm, normal heart sounds, intact distal pulses and normal pulses. Exam reveals no gallop.   No murmur heard.  Pulmonary/Chest: Effort normal and breath sounds normal. No respiratory distress. He has no wheezes. He has no rales.   Abdominal: He exhibits no distension. There is no tenderness.   Musculoskeletal: Normal range of motion.   Neurological: He is alert and oriented to person, place, and time. He exhibits normal muscle tone. Coordination normal.   Skin: Skin is warm. No rash noted. No erythema. No pallor. "   Psychiatric: He has a normal mood and affect. His behavior is normal. Judgment and thought content normal.   Nursing note and vitals reviewed.    Reviewed Data:  No visits with results within 1 Month(s) from this visit.   Latest known visit with results is:   Lab on 07/29/2019   Component Date Value Ref Range Status   • 25 Hydroxy, Vitamin D 07/29/2019 54.5  30.0 - 100.0 ng/ml Final    Comment: Reference Range for Total Vitamin D 25(OH)  Deficiency <20.0 ng/mL  Insufficiency 21-29 ng/mL  Sufficiency  ng/mL  Toxicity >100 ng/ml     • Vitamin B-12 07/29/2019 965* 211 - 946 pg/mL Final   • Folate 07/29/2019 >20.00  4.78 - 24.20 ng/mL Final   • Total Cholesterol 07/29/2019 162  0 - 200 mg/dL Final   • Triglycerides 07/29/2019 369* 0 - 150 mg/dL Final   • HDL Cholesterol 07/29/2019 36* 40 - 60 mg/dL Final   • VLDL Cholesterol 07/29/2019 73.8  mg/dL Final   • LDL Cholesterol  07/29/2019 52  0 - 100 mg/dL Final   • Glucose 07/29/2019 96  65 - 99 mg/dL Final   • BUN 07/29/2019 10  8 - 23 mg/dL Final   • Creatinine 07/29/2019 1.34* 0.76 - 1.27 mg/dL Final   • eGFR Non African Am 07/29/2019 54* >60 mL/min/1.73 Final   • eGFR African Am 07/29/2019 66  >60 mL/min/1.73 Final   • BUN/Creatinine Ratio 07/29/2019 7.5  7.0 - 25.0 Final   • Sodium 07/29/2019 143  136 - 145 mmol/L Final   • Potassium 07/29/2019 3.9  3.5 - 5.2 mmol/L Final   • Chloride 07/29/2019 102  98 - 107 mmol/L Final   • Total CO2 07/29/2019 29.2* 22.0 - 29.0 mmol/L Final   • Calcium 07/29/2019 9.2  8.6 - 10.5 mg/dL Final   • Hemoglobin A1C 07/29/2019 6.00* 4.80 - 5.60 % Final    Comment: Hemoglobin A1C Ranges:  Increased Risk for Diabetes  5.7% to 6.4%  Diabetes                     >= 6.5%  Diabetic Goal                < 7.0%     • Free T4 07/29/2019 1.29  0.93 - 1.70 ng/dL Final   • TSH 07/29/2019 2.210  0.270 - 4.200 mIU/mL Final   • Interpretation 07/29/2019 Note   Final    Supplemental report is available.   • PDF Image 07/29/2019 Not applicable    Final

## 2019-10-21 LAB
BACTERIA SPEC AEROBE CULT: NORMAL
BACTERIA SPEC AEROBE CULT: NORMAL

## 2019-10-31 ENCOUNTER — TELEPHONE (OUTPATIENT)
Dept: INTERNAL MEDICINE | Facility: CLINIC | Age: 59
End: 2019-10-31

## 2019-10-31 DIAGNOSIS — R50.9 FEVER AND CHILLS: Primary | ICD-10-CM

## 2019-11-08 ENCOUNTER — TELEPHONE (OUTPATIENT)
Dept: INTERNAL MEDICINE | Facility: CLINIC | Age: 59
End: 2019-11-08
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
## 2019-11-08 NOTE — TELEPHONE ENCOUNTER
Patient called stating that was bit by a tick (groin line/femoral artery) about 3 months ago.  He still has a spot and it took a long time to heal.  He said that he spoke to infectious disease and they recommended that his physician order a [place] spotted fever or/and a lyme disease test.  Please advise.

## 2019-11-12 NOTE — TELEPHONE ENCOUNTER
Patient's wife called stating that patient is still feeling bad, lying around.  She stated that patient has not had his blood drawn for lyme disease and questioned if Dr. Hoover wanted to order a [place] spotted fever test also.  Please advise.

## 2019-11-15 NOTE — TELEPHONE ENCOUNTER
Spoke to patient - he stated that he did want to have both tests done.  Order put in [facility] for [place] per Dr. Hoover.

## 2019-11-20 ENCOUNTER — OFFICE VISIT (OUTPATIENT)
Dept: SLEEP MEDICINE | Facility: HOSPITAL | Age: 59
End: 2019-11-20

## 2019-11-20 ENCOUNTER — LAB (OUTPATIENT)
Dept: LAB | Facility: HOSPITAL | Age: 59
End: 2019-11-20

## 2019-11-20 VITALS
WEIGHT: 216 LBS | OXYGEN SATURATION: 96 % | HEIGHT: 74 IN | SYSTOLIC BLOOD PRESSURE: 125 MMHG | DIASTOLIC BLOOD PRESSURE: 76 MMHG | HEART RATE: 91 BPM | BODY MASS INDEX: 27.72 KG/M2
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
## 2019-11-20 DIAGNOSIS — G47.14 HYPERSOMNIA DUE TO MEDICAL CONDITION: ICD-10-CM

## 2019-11-20 DIAGNOSIS — G47.31 COMPLEX SLEEP APNEA SYNDROME: ICD-10-CM

## 2019-11-20 DIAGNOSIS — G47.33 OBSTRUCTIVE SLEEP APNEA, ADULT: Primary | ICD-10-CM

## 2019-11-20 PROCEDURE — G0463 HOSPITAL OUTPT CLINIC VISIT: HCPCS

## 2019-11-20 PROCEDURE — 86757 RICKETTSIA ANTIBODY: CPT | Performed by: FAMILY MEDICINE

## 2019-11-20 PROCEDURE — 36415 COLL VENOUS BLD VENIPUNCTURE: CPT

## 2019-11-20 PROCEDURE — 86617 LYME DISEASE ANTIBODY: CPT | Performed by: FAMILY MEDICINE

## 2019-11-20 PROCEDURE — 99204 OFFICE O/P NEW MOD 45 MIN: CPT | Performed by: INTERNAL MEDICINE

## 2019-11-20 NOTE — PROGRESS NOTES
[facility] New Patient/Consultation       Reason for Consultation: GAURANG     Patient Care Team:  Cesar Hoover MD as PCP - Internal Medicine  Rajan Tamez MD as Consulting Physician (Sleep Medicine)    Chief complaint: GAURANG    History of present illness:    Thank you for asking me to see your patient.  The patient is a 59 y.o. male who has a history of GAURANG previously treated with BiPAP auto SV.  He has not been treated since 2016.  Presently he states he is unable to get to sleep at night.  He is unable to stay asleep due to pain.  He sleeps off and on during the daytime.  He will go to bed around 11 PM during the weekday and 1 AM on the weekends.  His wake time and duration of sleep varies.  He is exhausted upon awakening.  He will take 2 naps daily.  [place] Sleepiness Scale is abnormal at 8.  He snores loudly and witnessed apnea noted.  He will awaken with a dry mouth.  He has leg jerking at night.  He has nocturnal pain.  He sweats excessively and will have complaints of RENATA.  He will have sudden episodes of sleep during the daytime.  He will use the bathroom 3 times during his sleep.  He has problems falling asleep and difficulty staying asleep and his sleep is generally restless.    Review of Systems:    A complete review of systems was done and all were negative with the exception of frequent and painful urination, persistent hoarseness with nasal congestion and postnasal drip, painful joints, poor appetite, shortness of breath with swelling of his ankles and occasional cough, and dizziness and depression    History:  Past Medical History:   Diagnosis Date   • Abdominal pain     Recurrent sx and follows with Dr. Heard.   • Abscess of buttock    • Acute gastritis    • Allergic rhinitis    • Alopecia    • Angina pectoris (CMS/HCC)    • Anxiety    • Arteriosclerotic coronary artery disease    • Benign prostatic hypertrophy (BPH) with nocturia    • Calf tenderness    • Cervical neck pain  with evidence of disc disease    • CSA (central sleep apnea) 09/14/2015    Overnight polysomnogram.  Weight 205 pounds.  AHI severely abnormal at 58 events per hour.  Central sleep apnea index 34 events per hour.  Obstructive sleep apnea index 24 events per hour.  No sleep-related hypoxia.  Auto SV titration performed 11/2/2015.   • DDD (degenerative disc disease), lumbar    • Degeneration of cervical intervertebral disc    • Depression    • Esophageal reflux     Follows with Dr. Jackson   • Fatigue    • Fever 04/2015    Low grade symptoms of fever for 3 weeks.   • Fibromyalgia    • Gastroparesis    • Health care maintenance    • Heart palpitations    • History of deep vein thrombophlebitis of lower extremity 2003    DVT Dx 2003   • Hypokalemia    • Idiopathic peripheral neuropathy    • Impaired fasting glucose    • Insomnia    • Lichen planus     tongue   • LOM (loss of memory)    • Mental status change     Episodes where he stares ahead and have some shaking of his limbs.  Possible seizure activity.  Referred to Dr. Santana, October 2, 2013.   • Nausea    • Onychomycosis of toenail    • GAURANG (obstructive sleep apnea) 05/08/2007    Overnight polysomnogram.  Weight 247 pounds.  AHI mildly abnormal at 11 events per hour without sleep-related hypoxia noted.   • Osteoarthritis of hip    • PN (peripheral neuropathy)    • Primary hypothyroidism    • Rash    • Tendonitis of left hip    • Tendonitis of right hip    • TIA (transient ischemic attack) 06/2016   • Tinea versicolor    ,   Past Surgical History:   Procedure Laterality Date   • APPENDECTOMY     • BACK SURGERY      Cervical   • CHOLECYSTECTOMY     • JOINT REPLACEMENT Bilateral     Hip   • LUMBAR FUSION     • SURAL NERVE/MUSCLE BIOPSY  04/2016   • TONSILLECTOMY     ,   Family History   Problem Relation Age of Onset   • Heart failure Mother         Congestive   • Heart disease Mother    • Atrial fibrillation Mother    • Alcohol abuse Paternal Uncle    • Heart failure  "Paternal Grandmother         Congestive   • No Known Problems Father    • No Known Problems Sister    • No Known Problems Brother    • Heart failure Maternal Grandmother    • Heart disease Maternal Grandmother    • No Known Problems Maternal Grandfather    • No Known Problems Paternal Grandfather     and   Social History     Tobacco Use   • Smoking status: Never Smoker   • Smokeless tobacco: Former User     Types: Chew   Substance Use Topics   • Alcohol use: No   • Drug use: No     Social History: He is a disabled former     Allergies:  Butorphanol; Hydroxyzine; Iodinated diagnostic agents; Iodine; Other; Phenytoin; Amoxicillin; and Clindamycin/lincomycin     Medication Review: His list was reviewed    Vital Signs:    Vitals:    11/20/19 1022   BP: 125/76   Pulse: 91   SpO2: 96%   Weight: 98 kg (216 lb)   Height: 188 cm (74\")      Body mass index is 27.73 kg/m².  Neck Circumference: 16 inches      Physical Exam:    Constitutional:  Well developed 59 y.o. male that appears in no apparent distress.  Awake & oriented times 3.  Normal mood with normal recent and remote memory and normal judgement.  Eyes:  Conjunctivae normal.  Oropharynx: Moist mucous membranes without exudate and a large tongue and borderline enlarged uvula and class III MP airway  Neck: Trachea midline  Respiratory: Effort is not labored  Cardiovascular: Radial pulse regular  Musculoskeletal: Gait appears normal, no digital clubbing evident, no pre-tibial edema    Results Review: I reviewed downloads from 2016.       Impression:   Obstructive sleep apnea with complex sleep apnea previously treated with BiPAP auto SV.  The patient presents with symptoms and signs consistent with his known history of obstructive sleep apnea with complaints of hypersomnolence.    Plan:  Good sleep hygiene measures should be maintained.  Weight loss would be beneficial in this patient who is overweight.    Pathophysiology of GAURANG described to the " patient.  Cardiovascular complications of untreated GAURANG also reviewed.      The sleep disorder staff did work with the patient and fitted him with an Air Fit N 30, medium.  The patient will present with up-to-date downloads in 1 month.    Thank you for requesting me to assist in this patient's care.    Rajan Tamez MD  Sleep Medicine  12/01/19  9:14 AM

## 2019-11-23 PROBLEM — G47.31 COMPLEX SLEEP APNEA SYNDROME: Status: ACTIVE | Noted: 2019-11-23

## 2019-11-23 PROBLEM — G47.14 HYPERSOMNIA DUE TO MEDICAL CONDITION: Status: ACTIVE | Noted: 2019-11-23

## 2019-11-23 PROBLEM — G47.39 COMPLEX SLEEP APNEA SYNDROME: Status: ACTIVE | Noted: 2019-11-23

## 2019-11-23 LAB
B BURGDOR IGG PATRN SER IB-IMP: NEGATIVE
B BURGDOR IGM PATRN SER IB-IMP: NEGATIVE
B BURGDOR18KD IGG SER QL IB: NORMAL
B BURGDOR23KD IGG SER QL IB: NORMAL
B BURGDOR23KD IGM SER QL IB: NORMAL
B BURGDOR28KD IGG SER QL IB: NORMAL
B BURGDOR30KD IGG SER QL IB: NORMAL
B BURGDOR39KD IGG SER QL IB: NORMAL
B BURGDOR39KD IGM SER QL IB: NORMAL
B BURGDOR41KD IGG SER QL IB: NORMAL
B BURGDOR41KD IGM SER QL IB: NORMAL
B BURGDOR45KD IGG SER QL IB: NORMAL
B BURGDOR58KD IGG SER QL IB: NORMAL
B BURGDOR66KD IGG SER QL IB: NORMAL
B BURGDOR93KD IGG SER QL IB: NORMAL
R RICKETTSI IGG SER QL IA: NEGATIVE
R RICKETTSI IGM TITR SER: 0.19 INDEX (ref 0–0.89)

## 2019-12-09 ENCOUNTER — OFFICE VISIT (OUTPATIENT)
Dept: INFECTIOUS DISEASES | Facility: CLINIC | Age: 59
End: 2019-12-09

## 2019-12-09 VITALS
SYSTOLIC BLOOD PRESSURE: 135 MMHG | HEIGHT: 74 IN | BODY MASS INDEX: 27.75 KG/M2 | WEIGHT: 216.2 LBS | DIASTOLIC BLOOD PRESSURE: 82 MMHG | TEMPERATURE: 98.3 F | HEART RATE: 82 BPM

## 2019-12-09 DIAGNOSIS — M79.7 FIBROMYALGIA: ICD-10-CM

## 2019-12-09 DIAGNOSIS — R50.9 FEVER OF UNKNOWN ORIGIN: Primary | ICD-10-CM

## 2019-12-09 PROCEDURE — 99205 OFFICE O/P NEW HI 60 MIN: CPT | Performed by: INTERNAL MEDICINE

## 2019-12-09 RX ORDER — DIPHENHYDRAMINE HYDROCHLORIDE 50 MG/ML
50 INJECTION INTRAMUSCULAR; INTRAVENOUS ONCE
Status: DISCONTINUED | OUTPATIENT
Start: 2019-12-09 | End: 2022-10-05

## 2019-12-09 RX ORDER — LEVOTHYROXINE SODIUM 100 MCG
TABLET ORAL
COMMUNITY
Start: 2019-12-02 | End: 2020-02-19

## 2019-12-09 NOTE — PROGRESS NOTES
Referring Provider: Cesar Hoover MD  [id number] [facility]  [address]  [place] [address]    Reason for Consultation: fever    History of present illness:  Joel Keating is a 59 y.o. male with severe fibromyalgia and chronic pain who I am asked to evaluate and give opinion for fever. History is obtained from the patient, his wife, and review of the old medical records which I summarize/synthesize as follows: He says about 3 months ago in September 2019 he developed a nagging cough and was diagnosed with bronchitis that did not improve w/ antibiotics. He had a CXR that did not show any infiltrate. He was given another round of antibiotics and the bronchitis resolved. He says that since that time he reports fever > 100.4 F on most days since that time. The temperature will return to normal without any specific intervention. He says sometimes his temperature will be too low at 95 F. He says that he feels bad all over when he has the fever. He says that his main symptoms the past few months have been occasional blacking out, dizziness, worsening fibromyalgia pain to the point he spends most of his time in bed. His appetite is poor but his weight has been stable between 214#-216# between September and now in review of EPIC. He denies lymphadenopathy. He has an occasional cough but not short of air. He occasionally has a night sweat.     He's traveled to [place] and [place] within the past 6 months. [place] was to visit [place] with his grandchild. [place] was to visit his brother. No known sick contacts at home or when traveling. He has 1 dog and 1 cat at home. Dog is 9 and cat is 10 years old. No significant bites or scratches. No farm exposures. No known TB exposures. He reports a negative TB skin test about 10 years ago.   He's lived in [place], [place], [place], and [place] (last 20-30 years).     He says none of his medications were new around the time the fevers began.     He says that his hips are his main joints that are hurting. He's required  steroid injections in each one. The incisions are intact. There is no heat or drainage.     His last flare of pancreatitis was 8-10 years ago. He had a lipase and amylase that were normal last month. His gallbladder and appendix were removed previously.    From an infection standpoint, he has a history of Staph infection in the neck in 2010 following cervical surgery. That infection is resolved and the incision is healed. At that time he also developed a C diff infection.     No family members with history of unexplained fevers.     No immunosuppressive medications.    He saw a rheumatologist a few years ago and was told he did not have RA.    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 Amoxicillin - rash  2. Clindamycin - rash    Medications:    Current Outpatient Medications:   •  albuterol sulfate  (90 Base) MCG/ACT inhaler, Inhale 2 puffs Every 4 (Four) Hours As Needed for Wheezing., Disp: 1 inhaler, Rfl: 0  •  cyclobenzaprine (FLEXERIL) 10 MG tablet, Take 10 mg by mouth Daily As Needed., Disp: , Rfl:   •  escitalopram (LEXAPRO) 20 MG tablet, Take 1 tablet by mouth daily., Disp: , Rfl:   •  gabapentin (NEURONTIN) 600 MG tablet, TAKE 2 TABLETS BY MOUTH  TWICE A DAY. MAXIMUM DAILY  AMOUNT: 2400MG, Disp: , Rfl:   •  guaifenesin-codeine (GUAIFENESIN AC) 100-10  "MG/5ML liquid, Take 5 mL by mouth 4 (Four) Times a Day As Needed for Cough., Disp: 180 mL, Rfl: 0  •  lisdexamfetamine (VYVANSE) 70 MG capsule, Take 1 capsule by mouth every morning., Disp: , Rfl:   •  methadone (DOLOPHINE) 5 MG tablet, Take 5 mg by mouth 2 (Two) Times a Day., Disp: , Rfl:   •  nortriptyline (PAMELOR) 50 MG capsule, Take 50 mg by mouth Every Night., Disp: , Rfl:   •  ondansetron (ZOFRAN) 4 MG tablet, Take 4 mg by mouth every 6 (six) hours as needed for nausea or vomiting. Take for nausea and vomiting., Disp: , Rfl:   •  Sennosides (SENNA) 8.6 MG capsule, Take 1 capsule by mouth Daily., Disp: , Rfl:   •  sucralfate (CARAFATE) 1 GM/10ML suspension, Take 10 mL by mouth 4 (four) times a day as needed., Disp: , Rfl:   •  SYNTHROID 100 MCG tablet, , Disp: , Rfl:   •  tamsulosin (FLOMAX) 0.4 MG capsule 24 hr capsule, Take 1 capsule by mouth daily., Disp: , Rfl:   •  TiZANidine (ZANAFLEX) 4 MG capsule, Take 4 mg by mouth Daily As Needed., Disp: , Rfl:   •  Turmeric 450 MG capsule, Take 1 capsule by mouth Daily., Disp: , Rfl:   •  Unable to find, Domperidone POWD; 1 tablet twice daily., Disp: , Rfl:   •  EVZIO 2 MG/0.4ML solution auto-injector, As Needed., Disp: , Rfl:   •  fluticasone (FLONASE) 50 MCG/ACT nasal spray, 1 spray into each nostril 2 (two) times a day. Administer 2 sprays in each nostril for each dose., Disp: 3 each, Rfl: 3      Review of Systems  All systems were reviewed; positives include fever, night sweats, weakness, dizziness, change in vision, double vision, oral ulcers, pain, cough, SOB, Chest discomfort, fainting, nausea, vomiting, R sided abdmoinal pain, constipation, urinary frequency and urgency, joint pain, back pain, blacking out, numbness, tingling, headache    Objective   Vital Signs   /82   Pulse 82   Temp 98.3 °F (36.8 °C)   Ht 188 cm (74.02\")   Wt 98.1 kg (216 lb 3.2 oz)   BMI 27.75 kg/m²       Physical Exam:   General: awake, alert, in some pain  Head: " Normocephalic, atraumatic  Eyes: Pupils equal, no scleral icterus, no conjunctival pallor, no conjunctival hemorrhages.   ENT: MMM, OP clear, no thrush. Good dentition.   Neck: Supple, neck incision is healed  Cardiovascular: NR, RR, no murmurs, no LE edema  Respiratory: Lungs are clear to auscultation bilaterally, no rales or wheezing; normal work of breathing on ambient air  GI: Abdomen is soft, non-tender, non-distended  : no Cotto catheter present  Musculoskeletal: B hip incisions are healed; L spine incision is healed  Skin: No rashes, lesions, or embolic phenomenon  Neurological: Alert and oriented x 3, motor strength 5/5 in all four extremities  Psychiatric: Normal mood and affect   Vasc: no cyanosis    Labs:     Lab Results   Component Value Date    WBC 5.50 10/16/2019    HGB 14.3 10/16/2019    HCT 41.0 10/16/2019    MCV 94.9 10/16/2019     10/16/2019       Lab Results   Component Value Date    GLUCOSE 115 (H) 12/08/2016    BUN 10 07/29/2019    CREATININE 1.34 (H) 07/29/2019    EGFRIFNONA 54 (L) 07/29/2019    EGFRIFAFRI 66 07/29/2019    BCR 7.5 07/29/2019    CO2 29.2 (H) 07/29/2019    CALCIUM 9.2 07/29/2019    PROTENTOTREF 7.0 02/04/2019    ALBUMIN 4.80 02/04/2019    LABIL2 2.2 02/04/2019    AST 16 02/04/2019    ALT 15 02/04/2019     Scanned labs (11/4/19)  CRP 1  WBC 5.6  Hct 39  Plt 220  AST 14  ALT 14  Amylase 51  Lipase 25    Other Labs:  Lyme Ab negative  RMSF Ab negative    Microbiology:  10/16 BCx: negative    Radiology (personally reviewed report):  9/26/19 CXR: negative for acute process    Assessment/Plan   1. Fever of unknown origin  2. Fibromyalgia    No clear explanation for the fevers. Infection seems less likely after history, exam, and review of his previous labs. He's had no documented fevers during his medical visits during this time. WBC has been normal, blood cultures negative, and CRP 1. However, I do think further investigation is warranted. Infection, malignancy, and  auto-immune are all considered. First, I asked him to keep a temperature diary at home so we can have more data to review at his next visit. Second, I will perform a lab workup with CBC, CMP, CRP, CARMEL, ANCA, anti-CCP, urine Histo Ag, urine Cocci Ag, blood cultures, and Bartonella Ab. Third, I am going to order a CT CAP w/ IV contrast if his kidney function is acceptable. He has a contrast allergy but says he has been successfully pre-medicated in the past so I ordered the contrast allergy protocol for steroids and Benadryl that is in Epic. He and his wife both expressed understanding and agreed to move forward w/ the CT scan.     Thank you for this consult. ID will follow. RTC 1 month.

## 2019-12-10 ENCOUNTER — APPOINTMENT (OUTPATIENT)
Dept: LAB | Facility: HOSPITAL | Age: 59
End: 2019-12-10

## 2019-12-10 LAB
ALBUMIN SERPL-MCNC: 5.3 G/DL (ref 3.5–5.2)
ALBUMIN/GLOB SERPL: 1.9 G/DL
ALP SERPL-CCNC: 88 U/L (ref 39–117)
ALT SERPL W P-5'-P-CCNC: 15 U/L (ref 1–41)
ANION GAP SERPL CALCULATED.3IONS-SCNC: 12.3 MMOL/L (ref 5–15)
AST SERPL-CCNC: 16 U/L (ref 1–40)
BASOPHILS # BLD AUTO: 0.02 10*3/MM3 (ref 0–0.2)
BASOPHILS NFR BLD AUTO: 0.4 % (ref 0–1.5)
BILIRUB SERPL-MCNC: 0.4 MG/DL (ref 0.2–1.2)
BUN BLD-MCNC: 10 MG/DL (ref 8–23)
BUN/CREAT SERPL: 6.7 (ref 7–25)
CALCIUM SPEC-SCNC: 9.6 MG/DL (ref 8.6–10.5)
CHLORIDE SERPL-SCNC: 98 MMOL/L (ref 98–107)
CO2 SERPL-SCNC: 27.7 MMOL/L (ref 22–29)
CREAT BLD-MCNC: 1.49 MG/DL (ref 0.76–1.27)
CRP SERPL-MCNC: 0.16 MG/DL (ref 0–0.5)
DEPRECATED RDW RBC AUTO: 44.1 FL (ref 37–54)
EOSINOPHIL # BLD AUTO: 0.23 10*3/MM3 (ref 0–0.4)
EOSINOPHIL NFR BLD AUTO: 4.1 % (ref 0.3–6.2)
ERYTHROCYTE [DISTWIDTH] IN BLOOD BY AUTOMATED COUNT: 12.9 % (ref 12.3–15.4)
GFR SERPL CREATININE-BSD FRML MDRD: 48 ML/MIN/1.73
GLOBULIN UR ELPH-MCNC: 2.8 GM/DL
GLUCOSE BLD-MCNC: 95 MG/DL (ref 65–99)
HCT VFR BLD AUTO: 41.5 % (ref 37.5–51)
HGB BLD-MCNC: 14.6 G/DL (ref 13–17.7)
IMM GRANULOCYTES # BLD AUTO: 0.01 10*3/MM3 (ref 0–0.05)
IMM GRANULOCYTES NFR BLD AUTO: 0.2 % (ref 0–0.5)
LYMPHOCYTES # BLD AUTO: 2.02 10*3/MM3 (ref 0.7–3.1)
LYMPHOCYTES NFR BLD AUTO: 35.8 % (ref 19.6–45.3)
MCH RBC QN AUTO: 33 PG (ref 26.6–33)
MCHC RBC AUTO-ENTMCNC: 35.2 G/DL (ref 31.5–35.7)
MCV RBC AUTO: 93.7 FL (ref 79–97)
MONOCYTES # BLD AUTO: 0.45 10*3/MM3 (ref 0.1–0.9)
MONOCYTES NFR BLD AUTO: 8 % (ref 5–12)
NEUTROPHILS # BLD AUTO: 2.92 10*3/MM3 (ref 1.7–7)
NEUTROPHILS NFR BLD AUTO: 51.5 % (ref 42.7–76)
NRBC BLD AUTO-RTO: 0 /100 WBC (ref 0–0.2)
PLATELET # BLD AUTO: 223 10*3/MM3 (ref 140–450)
PMV BLD AUTO: 8.5 FL (ref 6–12)
POTASSIUM BLD-SCNC: 3.9 MMOL/L (ref 3.5–5.2)
PROT SERPL-MCNC: 8.1 G/DL (ref 6–8.5)
RBC # BLD AUTO: 4.43 10*6/MM3 (ref 4.14–5.8)
SODIUM BLD-SCNC: 138 MMOL/L (ref 136–145)
WBC NRBC COR # BLD: 5.65 10*3/MM3 (ref 3.4–10.8)

## 2019-12-10 PROCEDURE — 87449 NOS EACH ORGANISM AG IA: CPT | Performed by: INTERNAL MEDICINE

## 2019-12-10 PROCEDURE — 86256 FLUORESCENT ANTIBODY TITER: CPT | Performed by: INTERNAL MEDICINE

## 2019-12-10 PROCEDURE — 86038 ANTINUCLEAR ANTIBODIES: CPT | Performed by: INTERNAL MEDICINE

## 2019-12-10 PROCEDURE — 86200 CCP ANTIBODY: CPT | Performed by: INTERNAL MEDICINE

## 2019-12-10 PROCEDURE — 85025 COMPLETE CBC W/AUTO DIFF WBC: CPT | Performed by: INTERNAL MEDICINE

## 2019-12-10 PROCEDURE — 86481 TB AG RESPONSE T-CELL SUSP: CPT | Performed by: INTERNAL MEDICINE

## 2019-12-10 PROCEDURE — 86225 DNA ANTIBODY NATIVE: CPT | Performed by: INTERNAL MEDICINE

## 2019-12-10 PROCEDURE — 86611 BARTONELLA ANTIBODY: CPT | Performed by: INTERNAL MEDICINE

## 2019-12-10 PROCEDURE — 36415 COLL VENOUS BLD VENIPUNCTURE: CPT | Performed by: INTERNAL MEDICINE

## 2019-12-10 PROCEDURE — 87385 HISTOPLASMA CAPSUL AG IA: CPT | Performed by: INTERNAL MEDICINE

## 2019-12-10 PROCEDURE — 80053 COMPREHEN METABOLIC PANEL: CPT | Performed by: INTERNAL MEDICINE

## 2019-12-10 PROCEDURE — 87040 BLOOD CULTURE FOR BACTERIA: CPT | Performed by: INTERNAL MEDICINE

## 2019-12-10 PROCEDURE — 86140 C-REACTIVE PROTEIN: CPT | Performed by: INTERNAL MEDICINE

## 2019-12-11 LAB
ANA SER QL: POSITIVE
C-ANCA TITR SER IF: NORMAL TITER
DSDNA AB SER-ACNC: 11 IU/ML (ref 0–9)
Lab: ABNORMAL
P-ANCA ATYPICAL TITR SER IF: NORMAL TITER
P-ANCA TITR SER IF: NORMAL TITER

## 2019-12-12 LAB
B HENSELAE IGG TITR SER IF: NEGATIVE TITER
B HENSELAE IGM TITR SER IF: NEGATIVE TITER
B QUINTANA IGG TITR SER: NEGATIVE TITER
B QUINTANA IGM TITR SER: NEGATIVE TITER
CCP IGA+IGG SERPL IA-ACNC: 8 UNITS (ref 0–19)
H CAPSUL AG SPEC QL: NORMAL
REF LAB TEST METHOD: NORMAL
TSPOT INTERPRETATION: NEGATIVE
TSPOT NIL CONTROL INTERPRETATION: NORMAL
TSPOT PANEL A: 0
TSPOT PANEL B: 0
TSPOT POS CONTROL INTERPRETATION: NORMAL

## 2019-12-15 LAB
BACTERIA SPEC AEROBE CULT: NORMAL
BACTERIA SPEC AEROBE CULT: NORMAL

## 2019-12-16 LAB — REF LAB TEST METHOD: NORMAL

## 2019-12-18 ENCOUNTER — APPOINTMENT (OUTPATIENT)
Dept: SLEEP MEDICINE | Facility: HOSPITAL | Age: 59
End: 2019-12-18

## 2019-12-19 ENCOUNTER — TELEPHONE (OUTPATIENT)
Dept: INFECTIOUS DISEASES | Facility: CLINIC | Age: 59
End: 2019-12-19

## 2019-12-19 DIAGNOSIS — R50.9 FEVER OF UNKNOWN ORIGIN: ICD-10-CM

## 2019-12-19 DIAGNOSIS — R79.89 CREATININE ELEVATION: ICD-10-CM

## 2019-12-19 DIAGNOSIS — R76.8 DS DNA ANTIBODY POSITIVE: Primary | ICD-10-CM

## 2019-12-19 NOTE — TELEPHONE ENCOUNTER
Pt to call schedule I for scheduling of CT.  Once Ct is ordered we will send in script per protocol due to iodine allergy.

## 2019-12-26 ENCOUNTER — TELEPHONE (OUTPATIENT)
Dept: INFECTIOUS DISEASES | Facility: CLINIC | Age: 59
End: 2019-12-26

## 2019-12-26 ENCOUNTER — DOCUMENTATION (OUTPATIENT)
Dept: INFECTIOUS DISEASES | Facility: CLINIC | Age: 59
End: 2019-12-26

## 2019-12-26 RX ORDER — PREDNISONE 50 MG/1
TABLET ORAL
Qty: 3 TABLET | Refills: 0 | Status: SHIPPED | OUTPATIENT
Start: 2019-12-26 | End: 2020-02-19

## 2019-12-26 NOTE — TELEPHONE ENCOUNTER
Patient notified his script has been sent in to his pharmacy and he will also need to  the OTC Benadryl to take one hour prior to Ct Scan. Mary Alice Rosario RN

## 2019-12-26 NOTE — TELEPHONE ENCOUNTER
Patient's CT is scheduled. He needs his pre-med for his iodine allergy called in to [facility]. The pharmacy info is correct in Epic. He will check with pharmacy tomorrow to make sure it is ready for . CARL RN

## 2019-12-26 NOTE — TELEPHONE ENCOUNTER
Please let patient know that I have sent in an RX for him to take 50 mg prednisone 13 hours prior to CT scan, 7 hours prior to CT scan, and 1 hour prior to CT scan. He will also need to take 50 mg of Benadryl (diphenhydramine) 1 hour prior to CT scan. He should have someone drive him to and from the scan as the Benadryl might make him drowsy. Thank you.

## 2019-12-30 ENCOUNTER — HOSPITAL ENCOUNTER (OUTPATIENT)
Dept: CT IMAGING | Facility: HOSPITAL | Age: 59
Discharge: HOME | End: 2019-12-30
Admitting: INTERNAL MEDICINE

## 2019-12-30 DIAGNOSIS — R50.9 FEVER OF UNKNOWN ORIGIN: ICD-10-CM

## 2019-12-30 PROCEDURE — 25010000002 IOPAMIDOL 61 % SOLUTION: Performed by: INTERNAL MEDICINE

## 2019-12-30 PROCEDURE — 71260 CT THORAX DX C+: CPT

## 2019-12-30 PROCEDURE — 82565 ASSAY OF CREATININE: CPT

## 2019-12-30 PROCEDURE — 74177 CT ABD & PELVIS W/CONTRAST: CPT

## 2019-12-30 RX ADMIN — IOPAMIDOL 90 ML: 612 INJECTION, SOLUTION INTRAVENOUS at 14:02

## 2019-12-31 LAB — CREAT BLDA-MCNC: 1.3 MG/DL (ref 0.6–1.3)

## 2020-01-10 ENCOUNTER — TELEPHONE (OUTPATIENT)
Dept: INFECTIOUS DISEASES | Facility: CLINIC | Age: 60
End: 2020-01-10

## 2020-01-10 NOTE — TELEPHONE ENCOUNTER
Pt Mr. Keating, states that he is returning Dr. Mckenna's call.  Patient states that he was seen by Dr. Duron on 01/08 and that Dr. Duron was very concerned.  Patient states at the time of his visit, his temp was 102 degrees and that he felt awful.  Mr. Keating states that Dr. Duron ordered multiple tests and that we should receive a copy of all results.  Mr. Keating wanted to thank Dr. Mckenna for all his care and states that he does not need a call back at this time unless Dr. Mckenna has any additional information that needs to be relayed.    (I have tried to get a copy of Dr. Duron's note.  It is not available at this time.  I will check back on Monday 01/13).

## 2020-01-20 ENCOUNTER — LAB (OUTPATIENT)
Dept: ENDOCRINOLOGY | Age: 60
End: 2020-01-20

## 2020-01-20 DIAGNOSIS — R73.03 PRE-DIABETES: ICD-10-CM

## 2020-01-20 DIAGNOSIS — E03.9 ACQUIRED HYPOTHYROIDISM: ICD-10-CM

## 2020-01-20 DIAGNOSIS — R53.82 CHRONIC FATIGUE: ICD-10-CM

## 2020-01-20 LAB
25(OH)D3+25(OH)D2 SERPL-MCNC: 49.3 NG/ML (ref 30–100)
BUN SERPL-MCNC: 10 MG/DL (ref 8–23)
BUN/CREAT SERPL: 6.3 (ref 7–25)
CALCIUM SERPL-MCNC: 9.2 MG/DL (ref 8.6–10.5)
CHLORIDE SERPL-SCNC: 99 MMOL/L (ref 98–107)
CHOLEST SERPL-MCNC: 193 MG/DL (ref 0–200)
CO2 SERPL-SCNC: 28.2 MMOL/L (ref 22–29)
CREAT SERPL-MCNC: 1.58 MG/DL (ref 0.76–1.27)
FOLATE SERPL-MCNC: >20 NG/ML (ref 4.78–24.2)
GLUCOSE SERPL-MCNC: 117 MG/DL (ref 65–99)
HBA1C MFR BLD: 5.6 % (ref 4.8–5.6)
HDLC SERPL-MCNC: 43 MG/DL (ref 40–60)
INTERPRETATION: NORMAL
LDLC SERPL CALC-MCNC: 108 MG/DL (ref 0–100)
Lab: NORMAL
POTASSIUM SERPL-SCNC: 4.3 MMOL/L (ref 3.5–5.2)
SODIUM SERPL-SCNC: 140 MMOL/L (ref 136–145)
T4 FREE SERPL-MCNC: 1.2 NG/DL (ref 0.93–1.7)
TRIGL SERPL-MCNC: 210 MG/DL (ref 0–150)
TSH SERPL DL<=0.005 MIU/L-ACNC: 5.75 UIU/ML (ref 0.27–4.2)
VIT B12 SERPL-MCNC: 1199 PG/ML (ref 211–946)
VLDLC SERPL CALC-MCNC: 42 MG/DL

## 2020-01-29 ENCOUNTER — TELEPHONE (OUTPATIENT)
Dept: ENDOCRINOLOGY | Age: 60
End: 2020-01-29

## 2020-01-29 NOTE — TELEPHONE ENCOUNTER
Pt called requesting a call back, said he said a  in [place] and they were looking at his labs from this office and he was told that his Thyroid is mess up. Pt asked to be call back very soon.

## 2020-01-31 RX ORDER — LEVOTHYROXINE SODIUM 112 MCG
112 TABLET ORAL DAILY
Qty: 30 TABLET | Refills: 11 | Status: SHIPPED | OUTPATIENT
Start: 2020-01-31 | End: 2020-11-03

## 2020-01-31 NOTE — TELEPHONE ENCOUNTER
Patient came into the office stating that another provide did is thyroid level and they were abnormal  He stated that he has not felt good in a while and have seen several specialist wanted to know if he needed his thyroid medication adjusted  Per Dr Serna  Patient is to increase his synthroid from 100 mcg to 112 mcg and have thyroid level check in 4 weeks   Patient notified and voice understanding of the medication change and lab draw

## 2020-02-14 ENCOUNTER — RESULTS ENCOUNTER (OUTPATIENT)
Dept: ENDOCRINOLOGY | Age: 60
End: 2020-02-14

## 2020-02-14 DIAGNOSIS — E03.9 ACQUIRED HYPOTHYROIDISM: ICD-10-CM

## 2020-02-14 DIAGNOSIS — E03.9 ACQUIRED HYPOTHYROIDISM: Primary | ICD-10-CM

## 2020-02-15 LAB
ALBUMIN SERPL-MCNC: 4.6 G/DL (ref 3.5–5.2)
ALBUMIN/GLOB SERPL: 2.1 G/DL
ALP SERPL-CCNC: 81 U/L (ref 39–117)
ALT SERPL-CCNC: 14 U/L (ref 1–41)
AST SERPL-CCNC: 16 U/L (ref 1–40)
BILIRUB SERPL-MCNC: 0.3 MG/DL (ref 0.2–1.2)
BUN SERPL-MCNC: 12 MG/DL (ref 8–23)
BUN/CREAT SERPL: 8.2 (ref 7–25)
CALCIUM SERPL-MCNC: 9.2 MG/DL (ref 8.6–10.5)
CHLORIDE SERPL-SCNC: 102 MMOL/L (ref 98–107)
CO2 SERPL-SCNC: 24.4 MMOL/L (ref 22–29)
CREAT SERPL-MCNC: 1.46 MG/DL (ref 0.76–1.27)
GLOBULIN SER CALC-MCNC: 2.2 GM/DL
GLUCOSE SERPL-MCNC: 111 MG/DL (ref 65–99)
POTASSIUM SERPL-SCNC: 4.2 MMOL/L (ref 3.5–5.2)
PROT SERPL-MCNC: 6.8 G/DL (ref 6–8.5)
SODIUM SERPL-SCNC: 140 MMOL/L (ref 136–145)
T4 FREE SERPL-MCNC: 1.66 NG/DL (ref 0.93–1.7)
TSH SERPL DL<=0.005 MIU/L-ACNC: 1.91 UIU/ML (ref 0.27–4.2)

## 2020-02-19 ENCOUNTER — OFFICE VISIT (OUTPATIENT)
Dept: ENDOCRINOLOGY | Age: 60
End: 2020-02-19

## 2020-02-19 ENCOUNTER — TELEPHONE (OUTPATIENT)
Dept: INFECTIOUS DISEASES | Facility: CLINIC | Age: 60
End: 2020-02-19

## 2020-02-19 VITALS
DIASTOLIC BLOOD PRESSURE: 70 MMHG | HEART RATE: 97 BPM | OXYGEN SATURATION: 97 % | SYSTOLIC BLOOD PRESSURE: 130 MMHG | WEIGHT: 221 LBS | HEIGHT: 74 IN | BODY MASS INDEX: 28.36 KG/M2

## 2020-02-19 DIAGNOSIS — R53.82 CHRONIC FATIGUE: ICD-10-CM

## 2020-02-19 DIAGNOSIS — E03.9 ACQUIRED HYPOTHYROIDISM: Primary | ICD-10-CM

## 2020-02-19 DIAGNOSIS — R73.03 PRE-DIABETES: ICD-10-CM

## 2020-02-19 PROCEDURE — 99214 OFFICE O/P EST MOD 30 MIN: CPT | Performed by: INTERNAL MEDICINE

## 2020-02-19 NOTE — TELEPHONE ENCOUNTER
Patient calling to say he has seen Rheumatologist and they are working to get his Thyroid in balance. He still has fevers (off and on x 4 months). Temp 101 today. He would like a call from Dr. Mckenna to see if he thinks this is related to his Fibromyalgia and has several other questions. Please advise if you would rather I made him an appointment. Mary Alice Rosario RN

## 2020-02-19 NOTE — PROGRESS NOTES
59 y.o.    Patient Care Team:  Provider, No Known as PCP - Cesar King MD as PCP - Internal Medicine    Chief Complaint:    Follow up/ hypohthyroidism  Subjective     HPI    59-year-old white male is here as a follow up for the management of hypothyroidism, hypogonadism.     Hypothyroidism  Today in clinic patient reports that he is on Synthroid 112 mcg oral daily. dose was recently increased in January 2020.  He is compliant with the medication and takes it regularly on empty stomach.    Patient continuously completes of feeling exhausted, he has chronic issue of fibromyalgia.  He is also currently dealing with fever-where he has been experiencing this for the last 4 months.  Patient is currently being followed by Dr. Duron and Dr. Mckenna for this issue.  Sleep is disturbed due to the pain.  Alternating heat and cold intolerance.  Tremors at times, no racing of heart or eye symptoms.  Does have history of orthostatic hypotension.  Does have family history of thyroid disease.     Hyperlipidemia- not on any medications      Borderline diabetes   Patient went for diabetic education and has been educated on diet changes and exercise regimen.      Fibromyalgia  Being followed by pain specialist      He has multiple questions pertaining to testosterone-natural medications in order to help with his energy levels.    Reviewed primary care physician's/consulting physician documentation and lab results :     Interval History      The following portions of the patient's history were reviewed and updated as appropriate: allergies, current medications, past family history, past medical history, past social history, past surgical history and problem list.    Past Medical History:   Diagnosis Date   • Abdominal pain     Recurrent sx and follows with Dr. Heard.   • Abscess of buttock    • Acute gastritis    • Allergic rhinitis    • Alopecia    • Angina pectoris (CMS/HCC)    • Anxiety    • Arteriosclerotic  coronary artery disease    • Benign prostatic hypertrophy (BPH) with nocturia    • Calf tenderness    • Cervical neck pain with evidence of disc disease    • CSA (central sleep apnea) 09/14/2015    Overnight polysomnogram.  Weight 205 pounds.  AHI severely abnormal at 58 events per hour.  Central sleep apnea index 34 events per hour.  Obstructive sleep apnea index 24 events per hour.  No sleep-related hypoxia.  Auto SV titration performed 11/2/2015.   • DDD (degenerative disc disease), lumbar    • Degeneration of cervical intervertebral disc    • Depression    • Esophageal reflux     Follows with Dr. Jackson   • Fatigue    • Fever 04/2015    Low grade symptoms of fever for 3 weeks.   • Fibromyalgia    • Gastroparesis    • Health care maintenance    • Heart palpitations    • History of deep vein thrombophlebitis of lower extremity 2003    DVT Dx 2003   • Hypokalemia    • Idiopathic peripheral neuropathy    • Impaired fasting glucose    • Insomnia    • Lichen planus     tongue   • LOM (loss of memory)    • Mental status change     Episodes where he stares ahead and have some shaking of his limbs.  Possible seizure activity.  Referred to Dr. Santana, October 2, 2013.   • Nausea    • Onychomycosis of toenail    • GAURANG (obstructive sleep apnea) 05/08/2007    Overnight polysomnogram.  Weight 247 pounds.  AHI mildly abnormal at 11 events per hour without sleep-related hypoxia noted.   • Osteoarthritis of hip    • PN (peripheral neuropathy)    • Primary hypothyroidism    • Rash    • Tendonitis of left hip    • Tendonitis of right hip    • TIA (transient ischemic attack) 06/2016   • Tinea versicolor      Family History   Problem Relation Age of Onset   • Heart failure Mother         Congestive   • Heart disease Mother    • Atrial fibrillation Mother    • Alcohol abuse Paternal Uncle    • Heart failure Paternal Grandmother         Congestive   • No Known Problems Father    • No Known Problems Sister    • No Known Problems  Brother    • Heart failure Maternal Grandmother    • Heart disease Maternal Grandmother    • No Known Problems Maternal Grandfather    • No Known Problems Paternal Grandfather      Social History     Socioeconomic History   • Marital status:      Spouse name: Not on file   • Number of children: 2   • Years of education: Not on file   • Highest education level: Not on file   Occupational History   • Occupation: Retired   Tobacco Use   • Smoking status: Never Smoker   • Smokeless tobacco: Former User     Types: Chew   Substance and Sexual Activity   • Alcohol use: No   • Drug use: No   • Sexual activity: Defer     Allergies   Allergen Reactions   • Butorphanol    • Hydroxyzine    • Iodinated Diagnostic Agents Hives   • Iodine    • Other      Vistra 650 Tabs   • Phenytoin    • Amoxicillin Rash   • Clindamycin/Lincomycin Rash       Current Outpatient Medications:   •  albuterol sulfate  (90 Base) MCG/ACT inhaler, Inhale 2 puffs Every 4 (Four) Hours As Needed for Wheezing., Disp: 1 inhaler, Rfl: 0  •  cyclobenzaprine (FLEXERIL) 10 MG tablet, Take 10 mg by mouth Daily As Needed., Disp: , Rfl:   •  escitalopram (LEXAPRO) 20 MG tablet, Take 1 tablet by mouth daily., Disp: , Rfl:   •  EVZIO 2 MG/0.4ML solution auto-injector, As Needed., Disp: , Rfl:   •  fluticasone (FLONASE) 50 MCG/ACT nasal spray, 1 spray into each nostril 2 (two) times a day. Administer 2 sprays in each nostril for each dose., Disp: 3 each, Rfl: 3  •  gabapentin (NEURONTIN) 600 MG tablet, TAKE 2 TABLETS BY MOUTH  TWICE A DAY. MAXIMUM DAILY  AMOUNT: 2400MG, Disp: , Rfl:   •  guaifenesin-codeine (GUAIFENESIN AC) 100-10 MG/5ML liquid, Take 5 mL by mouth 4 (Four) Times a Day As Needed for Cough., Disp: 180 mL, Rfl: 0  •  lisdexamfetamine (VYVANSE) 70 MG capsule, Take 1 capsule by mouth every morning., Disp: , Rfl:   •  methadone (DOLOPHINE) 5 MG tablet, Take 5 mg by mouth 2 (Two) Times a Day., Disp: , Rfl:   •  nortriptyline (PAMELOR) 50 MG  "capsule, Take 50 mg by mouth Every Night., Disp: , Rfl:   •  ondansetron (ZOFRAN) 4 MG tablet, Take 4 mg by mouth every 6 (six) hours as needed for nausea or vomiting. Take for nausea and vomiting., Disp: , Rfl:   •  Sennosides (SENNA) 8.6 MG capsule, Take 1 capsule by mouth Daily., Disp: , Rfl:   •  Sertraline HCl (ZOLOFT PO), Take  by mouth., Disp: , Rfl:   •  sucralfate (CARAFATE) 1 GM/10ML suspension, Take 10 mL by mouth 4 (four) times a day as needed., Disp: , Rfl:   •  SYNTHROID 112 MCG tablet, Take 1 tablet by mouth Daily., Disp: 30 tablet, Rfl: 11  •  tamsulosin (FLOMAX) 0.4 MG capsule 24 hr capsule, Take 1 capsule by mouth daily., Disp: , Rfl:   •  TiZANidine (ZANAFLEX) 4 MG capsule, Take 4 mg by mouth Daily As Needed., Disp: , Rfl:   •  Turmeric 450 MG capsule, Take 1 capsule by mouth Daily., Disp: , Rfl:   •  Unable to find, Domperidone POWD; 1 tablet twice daily., Disp: , Rfl:     Current Facility-Administered Medications:   •  diphenhydrAMINE (BENADRYL) injection 50 mg, 50 mg, Intravenous, Once, Tony Mckenna MD        Review of Systems   Constitutional: Positive for appetite change, fatigue and fever.   HENT: Positive for trouble swallowing.    Eyes: Negative for visual disturbance.   Respiratory: Positive for shortness of breath.    Cardiovascular: Positive for palpitations and leg swelling.   Gastrointestinal: Negative for abdominal pain and vomiting.   Endocrine: Negative for polydipsia and polyuria.   Musculoskeletal: Negative for joint swelling and neck pain.   Skin: Negative for rash.   Neurological: Positive for weakness and numbness.   Psychiatric/Behavioral: Negative for behavioral problems.     I have reviewed the ROS as documented by the MA; Janelle Serna MD.      Objective       Vitals:    02/19/20 1321   BP: 130/70   Pulse: 97   SpO2: 97%   Weight: 100 kg (221 lb)   Height: 188 cm (74\")     Body mass index is 28.37 kg/m².      Physical Exam   Constitutional: He is oriented " to person, place, and time. He appears well-nourished.   HENT:   Head: Normocephalic and atraumatic.   Eyes: Conjunctivae and EOM are normal. No scleral icterus.   Neck: No thyromegaly present.   Cardiovascular: Normal rate and regular rhythm.   Pulmonary/Chest: Effort normal and breath sounds normal. No stridor. No respiratory distress. He has no wheezes.   Abdominal: Soft. Bowel sounds are normal. He exhibits no distension. There is no tenderness.   Musculoskeletal: He exhibits no edema or deformity.   Lymphadenopathy:     He has no cervical adenopathy.   Neurological: He is alert and oriented to person, place, and time.   Skin: Skin is warm and dry. No rash noted. He is not diaphoretic.   Psychiatric: He has a normal mood and affect.   Vitals reviewed.    Results Review:     I reviewed the patient's new clinical results and mentioned them above in HPI and in plan as well.    Medical records reviewed  Summary:Done      Orders Only on 02/14/2020   Component Date Value Ref Range Status   • Glucose 02/14/2020 111* 65 - 99 mg/dL Final   • BUN 02/14/2020 12  8 - 23 mg/dL Final   • Creatinine 02/14/2020 1.46* 0.76 - 1.27 mg/dL Final   • eGFR Non African Am 02/14/2020 49* >60 mL/min/1.73 Final   • eGFR African Am 02/14/2020 59* >60 mL/min/1.73 Final   • BUN/Creatinine Ratio 02/14/2020 8.2  7.0 - 25.0 Final   • Sodium 02/14/2020 140  136 - 145 mmol/L Final   • Potassium 02/14/2020 4.2  3.5 - 5.2 mmol/L Final   • Chloride 02/14/2020 102  98 - 107 mmol/L Final   • Total CO2 02/14/2020 24.4  22.0 - 29.0 mmol/L Final   • Calcium 02/14/2020 9.2  8.6 - 10.5 mg/dL Final   • Total Protein 02/14/2020 6.8  6.0 - 8.5 g/dL Final   • Albumin 02/14/2020 4.60  3.50 - 5.20 g/dL Final   • Globulin 02/14/2020 2.2  gm/dL Final   • A/G Ratio 02/14/2020 2.1  g/dL Final   • Total Bilirubin 02/14/2020 0.3  0.2 - 1.2 mg/dL Final   • Alkaline Phosphatase 02/14/2020 81  39 - 117 U/L Final   • AST (SGOT) 02/14/2020 16  1 - 40 U/L Final   • ALT  "(SGPT) 02/14/2020 14  1 - 41 U/L Final   • Free T4 02/14/2020 1.66  0.93 - 1.70 ng/dL Final    Results may be falsely increased if patient taking Biotin.   • TSH 02/14/2020 1.910  0.270 - 4.200 uIU/mL Final     Lab Results   Component Value Date    HGBA1C 5.60 01/20/2020    HGBA1C 6.00 (H) 07/29/2019    HGBA1C 5.60 02/04/2019     Lab Results   Component Value Date    CREATININE 1.46 (H) 02/14/2020     Imaging Results (Most Recent)     None                Assessment and Plan:    Diagnoses and all orders for this visit:    Acquired hypothyroidism  -     TSH; Future  -     T4, Free; Future  -     TSH; Future  -     T4, Free; Future  -     Hemoglobin A1c; Future  -     Basic Metabolic Panel; Future  -     Lipid Panel; Future  -     Vitamin D 25 Hydroxy; Future    Chronic fatigue  -     TSH; Future  -     T4, Free; Future  -     TSH; Future  -     T4, Free; Future  -     Hemoglobin A1c; Future  -     Basic Metabolic Panel; Future  -     Lipid Panel; Future  -     Vitamin D 25 Hydroxy; Future    Pre-diabetes  -     TSH; Future  -     T4, Free; Future  -     TSH; Future  -     T4, Free; Future  -     Hemoglobin A1c; Future  -     Basic Metabolic Panel; Future  -     Lipid Panel; Future  -     Vitamin D 25 Hydroxy; Future        Hypothyroidism  Symptoms worse  Continue Synthroid 112 mcg oral daily  Repeat blood work-up in 2 to 3 months from now.    Prediabetes  Managed with diet control and exercise.    Chronic fatigue  Patient wants to be on testosterone replacement and some natural medications.  Given the patient's clinical issues explained to the patient the risks of being on testosterone and deferred this to the patient's urologist.  Refrained the patient from using natural medications given the risks.    Reviewed Lab results with the patient.             Janelle Serna MD  02/19/20    EM[facility] Dragon / transcription disclaimer:     \"Dictated utilizing Dragon dictation\".  "

## 2020-02-27 ENCOUNTER — TELEPHONE (OUTPATIENT)
Dept: INFECTIOUS DISEASES | Facility: CLINIC | Age: 60
End: 2020-02-27

## 2020-02-27 NOTE — TELEPHONE ENCOUNTER
Pt phoned the office on 02/19 and spoke with Mary Alice BISWAS at which time he stated that he is still having fever and was requesting a call back from Dr. Mckenna.  When the patient phoned today, he states that he does not have Lupus per his visit with Dr. Duron, but has been informed that his thyroid levels are abnormal.  Patient would like to speak with Dr. Mckenna regarding a couple of questions that he has.

## 2020-03-03 ENCOUNTER — OFFICE VISIT (OUTPATIENT)
Dept: INFECTIOUS DISEASES | Facility: CLINIC | Age: 60
End: 2020-03-03

## 2020-03-03 ENCOUNTER — APPOINTMENT (OUTPATIENT)
Dept: LAB | Facility: HOSPITAL | Age: 60
End: 2020-03-03

## 2020-03-03 VITALS
BODY MASS INDEX: 28.34 KG/M2 | HEIGHT: 74 IN | SYSTOLIC BLOOD PRESSURE: 124 MMHG | TEMPERATURE: 98.4 F | WEIGHT: 220.8 LBS | HEART RATE: 102 BPM | DIASTOLIC BLOOD PRESSURE: 78 MMHG

## 2020-03-03 DIAGNOSIS — M79.7 FIBROMYALGIA: ICD-10-CM

## 2020-03-03 DIAGNOSIS — R50.9 FUO (FEVER OF UNKNOWN ORIGIN): Primary | ICD-10-CM

## 2020-03-03 LAB
CORTIS SERPL-MCNC: 13.55 MCG/DL
ERYTHROCYTE [SEDIMENTATION RATE] IN BLOOD: 9 MM/HR (ref 0–20)
FERRITIN SERPL-MCNC: 80.8 NG/ML (ref 30–400)

## 2020-03-03 PROCEDURE — 88184 FLOWCYTOMETRY/ TC 1 MARKER: CPT | Performed by: INTERNAL MEDICINE

## 2020-03-03 PROCEDURE — 90674 CCIIV4 VAC NO PRSV 0.5 ML IM: CPT | Performed by: INTERNAL MEDICINE

## 2020-03-03 PROCEDURE — 85652 RBC SED RATE AUTOMATED: CPT | Performed by: INTERNAL MEDICINE

## 2020-03-03 PROCEDURE — 82533 TOTAL CORTISOL: CPT | Performed by: INTERNAL MEDICINE

## 2020-03-03 PROCEDURE — 81404 MOPATH PROCEDURE LEVEL 5: CPT | Performed by: INTERNAL MEDICINE

## 2020-03-03 PROCEDURE — 82164 ANGIOTENSIN I ENZYME TEST: CPT | Performed by: INTERNAL MEDICINE

## 2020-03-03 PROCEDURE — 88185 FLOWCYTOMETRY/TC ADD-ON: CPT | Performed by: INTERNAL MEDICINE

## 2020-03-03 PROCEDURE — 81479 UNLISTED MOLECULAR PATHOLOGY: CPT

## 2020-03-03 PROCEDURE — 90471 IMMUNIZATION ADMIN: CPT | Performed by: INTERNAL MEDICINE

## 2020-03-03 PROCEDURE — 36415 COLL VENOUS BLD VENIPUNCTURE: CPT | Performed by: INTERNAL MEDICINE

## 2020-03-03 PROCEDURE — 99214 OFFICE O/P EST MOD 30 MIN: CPT | Performed by: INTERNAL MEDICINE

## 2020-03-03 PROCEDURE — 82728 ASSAY OF FERRITIN: CPT | Performed by: INTERNAL MEDICINE

## 2020-03-03 NOTE — PROGRESS NOTES
CC: f/u FUO    History of present illness:  Joel Keating is a 59 y.o. male with PMH severe fibromyalgia here for follow-up of FUO. I saw him back in December and his infection workup was negative (see below). His CARMEL and anti-DS DNA were positive so I referred him to rheumatology but they did not feel that he had lupus or any connective tissue disorder. He was found to have hypothyroidism and was referred to endocrinology. He says there were no changes to his symptoms. He says that his temperature rises above 100.4 F once a day nearly every day. With the fever, he has body aches and fatigue. His symptoms improve when he goes to bed. No new symptoms to report today.     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 Amoxicillin - rash  2. Clindamycin - rash    Medications:    Current Outpatient Medications:   •  albuterol sulfate  (90 Base) MCG/ACT inhaler, Inhale 2 puffs Every 4 (Four) Hours As Needed for Wheezing., Disp: 1 inhaler, Rfl: 0  •  cyclobenzaprine (FLEXERIL) 10 MG tablet, Take 10 mg by mouth Daily As Needed., Disp: , Rfl:   •  escitalopram (LEXAPRO) 20 MG tablet, Take 1 tablet by mouth daily., Disp: , Rfl:   •  EVZIO 2 MG/0.4ML solution auto-injector, As Needed., Disp: , Rfl:   •  fluticasone (FLONASE) 50 MCG/ACT nasal spray, 1 spray  into each nostril 2 (two) times a day. Administer 2 sprays in each nostril for each dose., Disp: 3 each, Rfl: 3  •  gabapentin (NEURONTIN) 600 MG tablet, TAKE 2 TABLETS BY MOUTH  TWICE A DAY. MAXIMUM DAILY  AMOUNT: 2400MG, Disp: , Rfl:   •  guaifenesin-codeine (GUAIFENESIN AC) 100-10 MG/5ML liquid, Take 5 mL by mouth 4 (Four) Times a Day As Needed for Cough., Disp: 180 mL, Rfl: 0  •  lisdexamfetamine (VYVANSE) 70 MG capsule, Take 1 capsule by mouth every morning., Disp: , Rfl:   •  methadone (DOLOPHINE) 5 MG tablet, Take 5 mg by mouth 2 (Two) Times a Day., Disp: , Rfl:   •  nortriptyline (PAMELOR) 50 MG capsule, Take 50 mg by mouth Every Night., Disp: , Rfl:   •  ondansetron (ZOFRAN) 4 MG tablet, Take 4 mg by mouth every 6 (six) hours as needed for nausea or vomiting. Take for nausea and vomiting., Disp: , Rfl:   •  Sennosides (SENNA) 8.6 MG capsule, Take 1 capsule by mouth Daily., Disp: , Rfl:   •  Sertraline HCl (ZOLOFT PO), Take  by mouth., Disp: , Rfl:   •  sucralfate (CARAFATE) 1 GM/10ML suspension, Take 10 mL by mouth 4 (four) times a day as needed., Disp: , Rfl:   •  SYNTHROID 112 MCG tablet, Take 1 tablet by mouth Daily., Disp: 30 tablet, Rfl: 11  •  tamsulosin (FLOMAX) 0.4 MG capsule 24 hr capsule, Take 1 capsule by mouth daily., Disp: , Rfl:   •  TiZANidine (ZANAFLEX) 4 MG capsule, Take 4 mg by mouth Daily As Needed., Disp: , Rfl:   •  Turmeric 450 MG capsule, Take 1 capsule by mouth Daily., Disp: , Rfl:   •  Unable to find, Domperidone POWD; 1 tablet twice daily., Disp: , Rfl:     Current Facility-Administered Medications:   •  diphenhydrAMINE (BENADRYL) injection 50 mg, 50 mg, Intravenous, Once, Tony Mckenna MD      Review of Systems  All systems were reviewed; positives include fever, night sweats, weakness, dizziness, change in vision, double vision, oral ulcers, pain, cough, SOB, Chest discomfort, fainting, nausea, vomiting, R sided abdmoinal pain, constipation, urinary frequency and  "urgency, joint pain, back pain, blacking out, numbness, tingling, headache    Objective   Vital Signs   /78   Pulse 102   Temp 98.4 °F (36.9 °C)   Ht 188 cm (74.02\")   Wt 100 kg (220 lb 12.8 oz)   BMI 28.34 kg/m²     Physical Exam:   General: NAD, very nice  Eyes: Pupils equal, no scleral icterus  ENT: MMM, OP clear, no thrush. Good dentition.   Neck: Supple  Cardiovascular: NR, RR, no murmurs, no LE edema  Respiratory: Lungs are clear to auscultation bilaterally, no rales or wheezing; normal work of breathing on ambient air  GI: Abdomen is non-distended  : no Cotto catheter present  Skin: No rashes; dry skin on hands  Neurological: Alert and oriented x 3, motor strength 5/5 in all four extremities  Psychiatric: Normal mood and affect     Labs:   CBC, CMP, A1c, micro reviewed today  Lab Results   Component Value Date    WBC 5.65 12/10/2019    HGB 14.6 12/10/2019    HCT 41.5 12/10/2019    MCV 93.7 12/10/2019     12/10/2019       Lab Results   Component Value Date    GLUCOSE 95 12/10/2019    BUN 12 02/14/2020    CREATININE 1.46 (H) 02/14/2020    EGFRIFNONA 49 (L) 02/14/2020    EGFRIFAFRI 59 (L) 02/14/2020    BCR 8.2 02/14/2020    CO2 24.4 02/14/2020    CALCIUM 9.2 02/14/2020    PROTENTOTREF 6.8 02/14/2020    ALBUMIN 4.60 02/14/2020    LABIL2 2.1 02/14/2020    AST 16 02/14/2020    ALT 14 02/14/2020     Lab Results   Component Value Date    HGBA1C 5.60 01/20/2020     Lab Results   Component Value Date    CRP 0.16 12/10/2019     Previous Labs:  Lyme Ab negative  RMSF Ab negative  Histo Ag negative  Cocci Ag negative  Tspot negative  Bartonella Ab negative  Anti-CCP negative  ANCA negative  CARMLE positive with dsDNA also positive    Microbiology:  10/16 BCx: negative  12/10 BCx: negative    Radiology (personally reviewed report):  12/30 CT CAP:  Negative for mass, abscess, and lymphadenopathy    Assessment/Plan   1. Fever of unknown origin  2. Fibromyalgia    He still has daily fever but no new symptoms. " Previous infection and autoimmune workup negative to date. I thought he might have lupus with + CARMEL, + anti-DS DNA, and CKD but rheumatology did not feel that was the case. We discussed the differential diagnosis in FUO. The current statistics are that about 16% are due to infection, 7% due to cancer, 22% connective tissue disorder, 4% miscellaneous, and 51% go undiagnosed.     I am going to obtain a ferritin level, flow cytometry, period fever panel (sent to [email or web address]]), cortisol level, ESR, ACE level, and TTE. We reviewed his previously negative infection workup and negative CT CAP.     We reviewed his medication list. A few drugs are possibile culprits for drug fever such as escitalopram, nortriptyline, and Vyvanse but he has been on these for several years and isn't particularly interested in coming off of them to test the hypothesis which I think is reasonable given his severe underlying fibromyalgia.     I suggested the possibility of referral to an academic referral center such as the [facility] if this battery of tests does not reveal an answer. He is agreeable to this possibility.     He requests flu shot today which I have ordered.    [facility] TBD test results.

## 2020-03-04 LAB
ACE SERPL-CCNC: 56 U/L (ref 14–82)
REF LAB TEST METHOD: NORMAL

## 2020-03-12 ENCOUNTER — HOSPITAL ENCOUNTER (OUTPATIENT)
Dept: CARDIOLOGY | Facility: HOSPITAL | Age: 60
Discharge: HOME | End: 2020-03-12
Admitting: INTERNAL MEDICINE

## 2020-03-12 VITALS
HEIGHT: 74 IN | SYSTOLIC BLOOD PRESSURE: 130 MMHG | DIASTOLIC BLOOD PRESSURE: 64 MMHG | HEART RATE: 94 BPM | WEIGHT: 220 LBS | BODY MASS INDEX: 28.23 KG/M2

## 2020-03-12 LAB
AORTIC ARCH: 3.4 CM
AORTIC ROOT ANNULUS: 2.6 CM
ASCENDING AORTA: 3.8 CM
BH CV ECHO MEAS - ACS: 2.5 CM
BH CV ECHO MEAS - ASC AORTA: 3.8 CM
BH CV ECHO MEAS - BSA(HAYCOCK): 2.3 M^2
BH CV ECHO MEAS - BSA: 2.3 M^2
BH CV ECHO MEAS - BZI_BMI: 28.2 KILOGRAMS/M^2
BH CV ECHO MEAS - BZI_METRIC_HEIGHT: 188 CM
BH CV ECHO MEAS - BZI_METRIC_WEIGHT: 99.8 KG
BH CV ECHO MEAS - EDV(MOD-SP2): 83 ML
BH CV ECHO MEAS - EDV(MOD-SP4): 132 ML
BH CV ECHO MEAS - EDV(TEICH): 56.3 ML
BH CV ECHO MEAS - EF(CUBED): 60.1 %
BH CV ECHO MEAS - EF(MOD-BP): 68 %
BH CV ECHO MEAS - EF(MOD-SP2): 62.7 %
BH CV ECHO MEAS - EF(MOD-SP4): 72 %
BH CV ECHO MEAS - EF(TEICH): 52.5 %
BH CV ECHO MEAS - ESV(MOD-SP2): 31 ML
BH CV ECHO MEAS - ESV(MOD-SP4): 37 ML
BH CV ECHO MEAS - ESV(TEICH): 26.7 ML
BH CV ECHO MEAS - FS: 26.4 %
BH CV ECHO MEAS - IVS/LVPW: 0.97
BH CV ECHO MEAS - IVSD: 0.96 CM
BH CV ECHO MEAS - LAT PEAK E' VEL: 8 CM/SEC
BH CV ECHO MEAS - LV DIASTOLIC VOL/BSA (35-75): 58.3 ML/M^2
BH CV ECHO MEAS - LV MASS(C)D: 106.9 GRAMS
BH CV ECHO MEAS - LV MASS(C)DI: 47.2 GRAMS/M^2
BH CV ECHO MEAS - LV SYSTOLIC VOL/BSA (12-30): 16.4 ML/M^2
BH CV ECHO MEAS - LVIDD: 3.7 CM
BH CV ECHO MEAS - LVIDS: 2.7 CM
BH CV ECHO MEAS - LVLD AP2: 7.8 CM
BH CV ECHO MEAS - LVLD AP4: 8.4 CM
BH CV ECHO MEAS - LVLS AP2: 6.4 CM
BH CV ECHO MEAS - LVLS AP4: 6.8 CM
BH CV ECHO MEAS - LVOT AREA (M): 4.5 CM^2
BH CV ECHO MEAS - LVOT AREA: 4.6 CM^2
BH CV ECHO MEAS - LVOT DIAM: 2.4 CM
BH CV ECHO MEAS - LVPWD: 1 CM
BH CV ECHO MEAS - MED PEAK E' VEL: 5 CM/SEC
BH CV ECHO MEAS - MR MAX PG: 31.9 MMHG
BH CV ECHO MEAS - MR MAX VEL: 282.3 CM/SEC
BH CV ECHO MEAS - MV A DUR: 0.13 SEC
BH CV ECHO MEAS - MV A MAX VEL: 78.4 CM/SEC
BH CV ECHO MEAS - MV DEC SLOPE: 251.2 CM/SEC^2
BH CV ECHO MEAS - MV DEC TIME: 0.16 SEC
BH CV ECHO MEAS - MV E MAX VEL: 39.8 CM/SEC
BH CV ECHO MEAS - MV E/A: 0.51
BH CV ECHO MEAS - MV MAX PG: 2.9 MMHG
BH CV ECHO MEAS - MV MEAN PG: 1.4 MMHG
BH CV ECHO MEAS - MV P1/2T MAX VEL: 40.7 CM/SEC
BH CV ECHO MEAS - MV P1/2T: 47.5 MSEC
BH CV ECHO MEAS - MV V2 MAX: 85.5 CM/SEC
BH CV ECHO MEAS - MV V2 MEAN: 55.9 CM/SEC
BH CV ECHO MEAS - MV V2 VTI: 17.4 CM
BH CV ECHO MEAS - MVA P1/2T LCG: 5.4 CM^2
BH CV ECHO MEAS - MVA(P1/2T): 4.6 CM^2
BH CV ECHO MEAS - PA ACC TIME: 0.08 SEC
BH CV ECHO MEAS - PA PR(ACCEL): 42.2 MMHG
BH CV ECHO MEAS - PULM A REVS DUR: 0.14 SEC
BH CV ECHO MEAS - PULM A REVS VEL: 25.7 CM/SEC
BH CV ECHO MEAS - PULM DIAS VEL: 29.9 CM/SEC
BH CV ECHO MEAS - PULM S/D: 1.6
BH CV ECHO MEAS - PULM SYS VEL: 46.7 CM/SEC
BH CV ECHO MEAS - RAP SYSTOLE: 3 MMHG
BH CV ECHO MEAS - RV MAX PG: 1.1 MMHG
BH CV ECHO MEAS - RV MEAN PG: 0.73 MMHG
BH CV ECHO MEAS - RV V1 MAX: 53.1 CM/SEC
BH CV ECHO MEAS - RV V1 MEAN: 41.5 CM/SEC
BH CV ECHO MEAS - RV V1 VTI: 9.7 CM
BH CV ECHO MEAS - RVOT AREA: 3.8 CM^2
BH CV ECHO MEAS - RVOT DIAM: 2.2 CM
BH CV ECHO MEAS - RVSP: 26 MMHG
BH CV ECHO MEAS - SI(CUBED): 12.9 ML/M^2
BH CV ECHO MEAS - SI(MOD-SP2): 23 ML/M^2
BH CV ECHO MEAS - SI(MOD-SP4): 42 ML/M^2
BH CV ECHO MEAS - SI(TEICH): 13.1 ML/M^2
BH CV ECHO MEAS - SV(CUBED): 29.2 ML
BH CV ECHO MEAS - SV(MOD-SP2): 52 ML
BH CV ECHO MEAS - SV(MOD-SP4): 95 ML
BH CV ECHO MEAS - SV(RVOT): 37.1 ML
BH CV ECHO MEAS - SV(TEICH): 29.6 ML
BH CV ECHO MEAS - TAPSE (>1.6): 1.8 CM2
BH CV ECHO MEAS - TR MAX VEL: 241.6 CM/SEC
BH CV ECHO MEASUREMENTS AVERAGE E/E' RATIO: 6.12
BH CV XLRA - RV BASE: 2.6 CM
BH CV XLRA - RV LENGTH: 6.3 CM
BH CV XLRA - RV MID: 2.3 CM
BH CV XLRA - TDI S': 14 CM/SEC
LEFT ATRIUM VOLUME INDEX: 12 ML/M2
SINUS: 3.4 CM
STJ: 3.5 CM

## 2020-03-12 PROCEDURE — 93306 TTE W/DOPPLER COMPLETE: CPT

## 2020-03-12 PROCEDURE — 93306 TTE W/DOPPLER COMPLETE: CPT | Performed by: INTERNAL MEDICINE

## 2020-03-16 ENCOUNTER — TELEPHONE (OUTPATIENT)
Dept: INFECTIOUS DISEASES | Facility: CLINIC | Age: 60
End: 2020-03-16

## 2020-03-16 NOTE — TELEPHONE ENCOUNTER
Pt states that he phoned cardiology to obtain information regarding his recent Transthoracic Echo.  However, the MA informed the patient that he would have to speak with Dr. Mckenna regarding his results and any questions that he may have concerning those results.

## 2020-03-18 NOTE — TELEPHONE ENCOUNTER
His echo did not show any evidence of infection. It did show some impaired relaxation of the heart which is called diastolic dysfunction. Please offer to send a copy of the report to his PCP if he would like. Also please give him my apologies for the delayed response.

## 2020-03-20 ENCOUNTER — TELEPHONE (OUTPATIENT)
Dept: INFECTIOUS DISEASES | Facility: CLINIC | Age: 60
End: 2020-03-20

## 2020-03-20 NOTE — TELEPHONE ENCOUNTER
Patient notified of results of echo. He will follow up with Cardiology. Copy sent to Dr. Cesar Hoover per patient's request. Mary Alice Rosario RN

## 2020-04-10 LAB — REF LAB TEST RESULTS: NORMAL

## 2020-05-19 ENCOUNTER — RESULTS ENCOUNTER (OUTPATIENT)
Dept: ENDOCRINOLOGY | Age: 60
End: 2020-05-19

## 2020-05-19 DIAGNOSIS — E03.9 ACQUIRED HYPOTHYROIDISM: ICD-10-CM

## 2020-05-19 DIAGNOSIS — R73.03 PRE-DIABETES: ICD-10-CM

## 2020-05-19 DIAGNOSIS — R53.82 CHRONIC FATIGUE: ICD-10-CM

## 2020-06-15 ENCOUNTER — LAB (OUTPATIENT)
Dept: ENDOCRINOLOGY | Age: 60
End: 2020-06-15

## 2020-06-15 DIAGNOSIS — E03.9 ACQUIRED HYPOTHYROIDISM: ICD-10-CM

## 2020-06-15 DIAGNOSIS — R73.03 PRE-DIABETES: ICD-10-CM

## 2020-06-15 DIAGNOSIS — R53.82 CHRONIC FATIGUE: ICD-10-CM

## 2020-06-16 LAB
25(OH)D3+25(OH)D2 SERPL-MCNC: 36.6 NG/ML (ref 30–100)
BUN SERPL-MCNC: 11 MG/DL (ref 8–23)
BUN/CREAT SERPL: 6.8 (ref 7–25)
CALCIUM SERPL-MCNC: 9.2 MG/DL (ref 8.6–10.5)
CHLORIDE SERPL-SCNC: 103 MMOL/L (ref 98–107)
CHOLEST SERPL-MCNC: 157 MG/DL (ref 0–200)
CO2 SERPL-SCNC: 28 MMOL/L (ref 22–29)
CREAT SERPL-MCNC: 1.61 MG/DL (ref 0.76–1.27)
GLUCOSE SERPL-MCNC: 135 MG/DL (ref 65–99)
HBA1C MFR BLD: 5.5 % (ref 4.8–5.6)
HDLC SERPL-MCNC: 32 MG/DL (ref 40–60)
INTERPRETATION: NORMAL
LDLC SERPL CALC-MCNC: 78 MG/DL (ref 0–100)
Lab: NORMAL
POTASSIUM SERPL-SCNC: 4.4 MMOL/L (ref 3.5–5.2)
SODIUM SERPL-SCNC: 141 MMOL/L (ref 136–145)
T4 FREE SERPL-MCNC: 0.95 NG/DL (ref 0.93–1.7)
TRIGL SERPL-MCNC: 235 MG/DL (ref 0–150)
TSH SERPL DL<=0.005 MIU/L-ACNC: 0.51 UIU/ML (ref 0.27–4.2)
VLDLC SERPL CALC-MCNC: 47 MG/DL

## 2020-08-10 LAB
T4 FREE SERPL-MCNC: 1.43 NG/DL (ref 0.93–1.7)
TSH SERPL DL<=0.005 MIU/L-ACNC: 1.36 UIU/ML (ref 0.27–4.2)

## 2020-08-17 ENCOUNTER — OFFICE VISIT (OUTPATIENT)
Dept: INTERNAL MEDICINE | Facility: CLINIC | Age: 60
End: 2020-08-17

## 2020-08-17 VITALS
BODY MASS INDEX: 28.35 KG/M2 | HEART RATE: 108 BPM | OXYGEN SATURATION: 98 % | WEIGHT: 220.9 LBS | HEIGHT: 74 IN | DIASTOLIC BLOOD PRESSURE: 60 MMHG | SYSTOLIC BLOOD PRESSURE: 114 MMHG

## 2020-08-17 DIAGNOSIS — Z00.00 HEALTH CARE MAINTENANCE: Primary | ICD-10-CM

## 2020-08-17 PROCEDURE — 99396 PREV VISIT EST AGE 40-64: CPT | Performed by: FAMILY MEDICINE

## 2020-08-21 ENCOUNTER — TELEPHONE (OUTPATIENT)
Dept: INTERNAL MEDICINE | Facility: CLINIC | Age: 60
End: 2020-08-21

## 2020-08-21 NOTE — TELEPHONE ENCOUNTER
PATIENT CALLED FOR MED REQUEST FOR NASAL SPRAY FROM VISIT. PHARMACY DOES NOT HAVE PRESCRIPTION       [facility] [address] [address] [facility] [employer] [place] - [phone]  - [phone]   [phone]      PATIENT CALL BACK NUMBER [phone]    HE IS ALSO INQUIRING ABOUT DISABILITY PAPERWORK.

## 2020-08-21 NOTE — TELEPHONE ENCOUNTER
Called and spoke with pt, he said he has flonase but Dr. Hoover said he would call him in something different. His nose has been running like a faucet for months. Please advise?

## 2020-08-24 RX ORDER — IPRATROPIUM BROMIDE 42 UG/1
2 SPRAY, METERED NASAL 3 TIMES DAILY
Qty: 15 ML | Refills: 5 | Status: SHIPPED | OUTPATIENT
Start: 2020-08-24 | End: 2021-07-13

## 2020-09-04 ENCOUNTER — OFFICE VISIT (OUTPATIENT)
Dept: CARDIOLOGY | Facility: CLINIC | Age: 60
End: 2020-09-04

## 2020-09-04 VITALS
BODY MASS INDEX: 28.36 KG/M2 | DIASTOLIC BLOOD PRESSURE: 72 MMHG | HEART RATE: 89 BPM | WEIGHT: 221 LBS | SYSTOLIC BLOOD PRESSURE: 132 MMHG | HEIGHT: 74 IN

## 2020-09-04 DIAGNOSIS — M79.7 FIBROMYALGIA: ICD-10-CM

## 2020-09-04 DIAGNOSIS — N28.9 RENAL INSUFFICIENCY: ICD-10-CM

## 2020-09-04 DIAGNOSIS — R55 SYNCOPE AND COLLAPSE: ICD-10-CM

## 2020-09-04 DIAGNOSIS — R07.2 PRECORDIAL PAIN: Primary | ICD-10-CM

## 2020-09-04 DIAGNOSIS — I25.10 CHRONIC CORONARY ARTERY DISEASE: ICD-10-CM

## 2020-09-04 DIAGNOSIS — G47.33 OBSTRUCTIVE SLEEP APNEA, ADULT: ICD-10-CM

## 2020-09-04 PROCEDURE — 99214 OFFICE O/P EST MOD 30 MIN: CPT | Performed by: INTERNAL MEDICINE

## 2020-09-04 PROCEDURE — 93000 ELECTROCARDIOGRAM COMPLETE: CPT | Performed by: INTERNAL MEDICINE

## 2020-09-04 NOTE — PROGRESS NOTES
Subjective:     Encounter Date:09/04/2020      Patient ID: Joel Keating is a 60 y.o. male.    Chief Complaint:  History of Present Illness    This is a 60-year-old with a history of mild nonobstructive coronary artery disease, fibromyalgia, anxiety, hypothyroidism, who presents for follow-up.    The patient was previously followed by Dr. Ventura.  Began seeing Dr. Ventura in 2014 when he presented to the hospital with symptoms of unstable angina.  He underwent a cardiac catheterization at that time that showed mild nonobstructive coronary artery disease and small myocardial bridge of the LAD.  Since then he has followed up with Dr. Ventura on a yearly basis.  He was last seen by Dr. Ventura in 7/2017 at which time he appeared to be stable.  He was last seen in the office by KARTHIK Huynh and 8/2019 at which time he appeared to be stable from a cardiac standpoint.    Today he presents for routine follow-up.  He reports starting in December he started having daily mild fevers.  His work-up was unremarkable and he was even seen by Dr. Mckenna with infectious disease.  His work-up with him included an echocardiogram which was performed in 3/2020 and showed normal left ventricular systolic function and wall motion with an EF of 68%, grade 1 diastolic dysfunction, and no significant valvular disease.  He reports that his fevers have improved some since then.  He reports in the last 1 to 2 months he has been having episodes of chest pressure that occur at rest, radiate to his jaw and left shoulder, and associated with diaphoresis.  Symptoms can last up to 10 minutes.  He denies any symptoms with exertion.  He also reports episodes of syncope.  He reports that this usually occurs after he gets up from a seated position.  Normally when he gets up from a seated position he will take his time.  The syncopal episode will occur after he starts taking a few steps after arising from a seated position.  He reports he has had 5 or 6  episodes in the last couple months.  Denies any preceding symptoms.  He additionally denies any palpitations, or lower extremity edema.      Review of Systems   Constitution: Negative for malaise/fatigue.   HENT: Negative for hearing loss, hoarse voice, nosebleeds and sore throat.    Eyes: Negative for pain.   Cardiovascular: Positive for chest pain and palpitations. Negative for claudication, cyanosis, dyspnea on exertion, irregular heartbeat, leg swelling, near-syncope, orthopnea, paroxysmal nocturnal dyspnea and syncope.   Respiratory: Negative for shortness of breath and snoring.    Endocrine: Negative for cold intolerance, heat intolerance, polydipsia, polyphagia and polyuria.   Skin: Negative for itching and rash.   Musculoskeletal: Negative for arthritis, falls, joint pain, joint swelling, muscle cramps, muscle weakness and myalgias.   Gastrointestinal: Negative for constipation, diarrhea, dysphagia, heartburn, hematemesis, hematochezia, melena, nausea and vomiting.   Genitourinary: Negative for frequency, hematuria and hesitancy.   Neurological: Negative for excessive daytime sleepiness, dizziness, headaches, light-headedness, numbness and weakness.   Psychiatric/Behavioral: Negative for depression. The patient is not nervous/anxious.          Current Outpatient Medications:   •  albuterol sulfate  (90 Base) MCG/ACT inhaler, Inhale 2 puffs Every 4 (Four) Hours As Needed for Wheezing., Disp: 1 inhaler, Rfl: 0  •  cyclobenzaprine (FLEXERIL) 10 MG tablet, Take 10 mg by mouth Daily As Needed., Disp: , Rfl:   •  escitalopram (LEXAPRO) 20 MG tablet, Take 1 tablet by mouth daily., Disp: , Rfl:   •  EVZIO 2 MG/0.4ML solution auto-injector, As Needed., Disp: , Rfl:   •  fluticasone (FLONASE) 50 MCG/ACT nasal spray, 1 spray into each nostril 2 (two) times a day. Administer 2 sprays in each nostril for each dose., Disp: 3 each, Rfl: 3  •  gabapentin (NEURONTIN) 600 MG tablet, TAKE 2 TABLETS BY MOUTH  TWICE A  DAY. MAXIMUM DAILY  AMOUNT: 2400MG, Disp: , Rfl:   •  ipratropium (Atrovent) 0.06 % nasal spray, 2 sprays into the nostril(s) as directed by provider 3 (Three) Times a Day., Disp: 15 mL, Rfl: 5  •  lisdexamfetamine (VYVANSE) 70 MG capsule, Take 1 capsule by mouth every morning., Disp: , Rfl:   •  methadone (DOLOPHINE) 5 MG tablet, Take 5 mg by mouth 2 (Two) Times a Day., Disp: , Rfl:   •  nortriptyline (PAMELOR) 50 MG capsule, Take 50 mg by mouth Every Night., Disp: , Rfl:   •  ondansetron (ZOFRAN) 4 MG tablet, Take 4 mg by mouth every 6 (six) hours as needed for nausea or vomiting. Take for nausea and vomiting., Disp: , Rfl:   •  Sennosides (SENNA) 8.6 MG capsule, Take 1 capsule by mouth Daily., Disp: , Rfl:   •  sucralfate (CARAFATE) 1 GM/10ML suspension, Take 10 mL by mouth 4 (four) times a day as needed., Disp: , Rfl:   •  SYNTHROID 112 MCG tablet, Take 1 tablet by mouth Daily., Disp: 30 tablet, Rfl: 11  •  tamsulosin (FLOMAX) 0.4 MG capsule 24 hr capsule, Take 1 capsule by mouth daily., Disp: , Rfl:   •  Turmeric 450 MG capsule, Take 1 capsule by mouth Daily., Disp: , Rfl:   •  Unable to find, Domperidone POWD; 1 tablet twice daily., Disp: , Rfl:     Current Facility-Administered Medications:   •  diphenhydrAMINE (BENADRYL) injection 50 mg, 50 mg, Intravenous, Once, Tony Mckenna MD    Past Medical History:   Diagnosis Date   • Abdominal pain     Recurrent sx and follows with Dr. Heard.   • Abscess of buttock    • Acute gastritis    • Allergic rhinitis    • Alopecia    • Angina pectoris (CMS/HCC)    • Anxiety    • Arteriosclerotic coronary artery disease    • Benign prostatic hypertrophy (BPH) with nocturia    • Calf tenderness    • Cervical neck pain with evidence of disc disease    • CSA (central sleep apnea) 09/14/2015    Overnight polysomnogram.  Weight 205 pounds.  AHI severely abnormal at 58 events per hour.  Central sleep apnea index 34 events per hour.  Obstructive sleep apnea index 24  events per hour.  No sleep-related hypoxia.  Auto SV titration performed 11/2/2015.   • DDD (degenerative disc disease), lumbar    • Degeneration of cervical intervertebral disc    • Depression    • Esophageal reflux     Follows with Dr. Jackson   • Fatigue    • Fever 04/2015    Low grade symptoms of fever for 3 weeks.   • Fibromyalgia    • Gastroparesis    • Health care maintenance    • Heart palpitations    • History of deep vein thrombophlebitis of lower extremity 2003    DVT Dx 2003   • Hypokalemia    • Idiopathic peripheral neuropathy    • Impaired fasting glucose    • Insomnia    • Lichen planus     tongue   • LOM (loss of memory)    • Mental status change     Episodes where he stares ahead and have some shaking of his limbs.  Possible seizure activity.  Referred to Dr. Santana, October 2, 2013.   • Nausea    • Onychomycosis of toenail    • GAURANG (obstructive sleep apnea) 05/08/2007    Overnight polysomnogram.  Weight 247 pounds.  AHI mildly abnormal at 11 events per hour without sleep-related hypoxia noted.   • Osteoarthritis of hip    • PN (peripheral neuropathy)    • Primary hypothyroidism    • Rash    • Tendonitis of left hip    • Tendonitis of right hip    • TIA (transient ischemic attack) 06/2016   • Tinea versicolor        Past Surgical History:   Procedure Laterality Date   • APPENDECTOMY     • BACK SURGERY      Cervical   • CHOLECYSTECTOMY     • JOINT REPLACEMENT Bilateral     Hip   • LUMBAR FUSION     • SURAL NERVE/MUSCLE BIOPSY  04/2016   • TONSILLECTOMY         Family History   Problem Relation Age of Onset   • Heart failure Mother         Congestive   • Heart disease Mother    • Atrial fibrillation Mother    • Alcohol abuse Paternal Uncle    • Heart failure Paternal Grandmother         Congestive   • No Known Problems Father    • No Known Problems Sister    • No Known Problems Brother    • Heart failure Maternal Grandmother    • Heart disease Maternal Grandmother    • No Known Problems Maternal  "Grandfather    • No Known Problems Paternal Grandfather        Social History     Tobacco Use   • Smoking status: Never Smoker   • Smokeless tobacco: Former User     Types: Chew   Substance Use Topics   • Alcohol use: No   • Drug use: No         ECG 12 Lead  Date/Time: 9/4/2020 4:54 PM  Performed by: Shanta oJhnson MD  Authorized by: Shanta Johnson MD   Comparison: compared with previous ECG   Similar to previous ECG  Rhythm: sinus rhythm               Objective:     Visit Vitals  /72   Pulse 89   Ht 188 cm (74\")   Wt 100 kg (221 lb)   BMI 28.37 kg/m²         Physical Exam   Constitutional: He is oriented to person, place, and time. He appears well-developed and well-nourished.   HENT:   Head: Normocephalic and atraumatic.   Neck: No JVD present. Carotid bruit is not present.   Cardiovascular: Normal rate, regular rhythm, S1 normal and S2 normal. Exam reveals no gallop.   No murmur heard.  Pulses:       Radial pulses are 2+ on the right side, and 2+ on the left side.   Pulmonary/Chest: Effort normal and breath sounds normal.   Abdominal: Soft. Normal appearance.   Musculoskeletal: He exhibits no edema.   Neurological: He is alert and oriented to person, place, and time.   Skin: Skin is warm, dry and intact.   Psychiatric: He has a normal mood and affect.         Assessment:          Diagnosis Plan   1. Precordial pain     2. Chronic coronary artery disease     3. Obstructive sleep apnea, adult     4. Renal insufficiency     5. Fibromyalgia            Plan:       1.  Chest pain.  Typical and atypical features.  With his history of mild nonobstructive coronary artery disease in his last testing and in 2016 I think we should proceed with a stress test.  2.  Syncope.  Echocardiogram earlier this year was unremarkable.  We will proceed with a ZIO monitor.  3.  Mild nonobstructive coronary artery disease.  Noted on cardiac catheterization in 2014.  4.  Obstructive sleep apnea  5.  Chronic kidney disease  6.  " Hypothyroidism  7.  Fibromyalgia  8.  Anxiety    We will call and discuss results of the stress test and the ZIO monitor and determine further recommendations based on those results.

## 2020-09-09 ENCOUNTER — TELEPHONE (OUTPATIENT)
Dept: CARDIOLOGY | Facility: CLINIC | Age: 60
End: 2020-09-09

## 2020-09-09 NOTE — TELEPHONE ENCOUNTER
Can we find out from the company if we can try another monitor?  If not I would have go ahead and have him send it in.  thanks

## 2020-09-09 NOTE — TELEPHONE ENCOUNTER
Patient called today stating that he has been having a hard time getting the Zio to stay on.  It first fell off on Saturday, he called the company, they told him to just stick it back on, which he did, but it ended up falling off again.    Should he just go ahead and send it back in?  If not, what do you recommend?    Thanks!

## 2020-09-09 NOTE — TELEPHONE ENCOUNTER
Spoke with Penny in the Holter department.  She suggested that the patient come back into the office so that she can replace it.    She is going to call the patient to arrange day/time.

## 2020-09-16 ENCOUNTER — HOSPITAL ENCOUNTER (OUTPATIENT)
Dept: CARDIOLOGY | Facility: HOSPITAL | Age: 60
Discharge: HOME | End: 2020-09-16
Admitting: INTERNAL MEDICINE

## 2020-09-16 VITALS — BODY MASS INDEX: 28.29 KG/M2 | HEIGHT: 74 IN | WEIGHT: 220.46 LBS

## 2020-09-16 LAB
BH CV NUCLEAR PRIOR STUDY: 2
BH CV STRESS BP STAGE 1: NORMAL
BH CV STRESS BP STAGE 2: NORMAL
BH CV STRESS DURATION MIN STAGE 1: 3
BH CV STRESS DURATION MIN STAGE 2: 3
BH CV STRESS DURATION MIN STAGE 3: 2
BH CV STRESS DURATION SEC STAGE 1: 0
BH CV STRESS DURATION SEC STAGE 2: 0
BH CV STRESS DURATION SEC STAGE 3: 0
BH CV STRESS GRADE STAGE 1: 10
BH CV STRESS GRADE STAGE 2: 12
BH CV STRESS GRADE STAGE 3: 14
BH CV STRESS HR STAGE 1: 102
BH CV STRESS HR STAGE 2: 111
BH CV STRESS HR STAGE 3: 118
BH CV STRESS METS STAGE 1: 5
BH CV STRESS METS STAGE 2: 7.5
BH CV STRESS METS STAGE 3: 10
BH CV STRESS PROTOCOL 1: NORMAL
BH CV STRESS PROTOCOL 2 BP STAGE 1: NORMAL
BH CV STRESS PROTOCOL 2 COMMENTS STAGE 1: NORMAL
BH CV STRESS PROTOCOL 2 DOSE REGADENOSON STAGE 1: 0.4
BH CV STRESS PROTOCOL 2 DURATION MIN STAGE 1: 0
BH CV STRESS PROTOCOL 2 DURATION SEC STAGE 1: 10
BH CV STRESS PROTOCOL 2 HR STAGE 1: 110
BH CV STRESS PROTOCOL 2 STAGE 1: 1
BH CV STRESS PROTOCOL 2: NORMAL
BH CV STRESS RECOVERY BP: NORMAL MMHG
BH CV STRESS RECOVERY HR: 92 BPM
BH CV STRESS SPEED STAGE 1: 1.7
BH CV STRESS SPEED STAGE 2: 2.5
BH CV STRESS SPEED STAGE 3: 3.4
BH CV STRESS STAGE 1: 1
BH CV STRESS STAGE 2: 2
BH CV STRESS STAGE 3: 3
LV EF NUC BP: 54 %
MAXIMAL PREDICTED HEART RATE: 158 BPM
PERCENT MAX PREDICTED HR: 69.62 %
STRESS BASELINE BP: NORMAL MMHG
STRESS BASELINE HR: 82 BPM
STRESS PERCENT HR: 82 %
STRESS POST EXERCISE DUR MIN: 8 MIN
STRESS POST PEAK BP: NORMAL MMHG
STRESS POST PEAK HR: 110 BPM
STRESS TARGET HR: 134 BPM

## 2020-09-16 PROCEDURE — 25010000002 REGADENOSON 0.4 MG/5ML SOLUTION: Performed by: INTERNAL MEDICINE

## 2020-09-16 PROCEDURE — 93017 CV STRESS TEST TRACING ONLY: CPT

## 2020-09-16 PROCEDURE — 0 TECHNETIUM TETROFOSMIN KIT: Performed by: INTERNAL MEDICINE

## 2020-09-16 PROCEDURE — 93018 CV STRESS TEST I&R ONLY: CPT | Performed by: INTERNAL MEDICINE

## 2020-09-16 PROCEDURE — 93016 CV STRESS TEST SUPVJ ONLY: CPT | Performed by: INTERNAL MEDICINE

## 2020-09-16 PROCEDURE — 78452 HT MUSCLE IMAGE SPECT MULT: CPT

## 2020-09-16 PROCEDURE — A9502 TC99M TETROFOSMIN: HCPCS | Performed by: INTERNAL MEDICINE

## 2020-09-16 PROCEDURE — 78452 HT MUSCLE IMAGE SPECT MULT: CPT | Performed by: INTERNAL MEDICINE

## 2020-09-16 RX ADMIN — TETROFOSMIN 1 DOSE: 1.38 INJECTION, POWDER, LYOPHILIZED, FOR SOLUTION INTRAVENOUS at 11:49

## 2020-09-16 RX ADMIN — TETROFOSMIN 1 DOSE: 1.38 INJECTION, POWDER, LYOPHILIZED, FOR SOLUTION INTRAVENOUS at 12:46

## 2020-09-16 RX ADMIN — REGADENOSON 0.4 MG: 0.08 INJECTION, SOLUTION INTRAVENOUS at 12:46

## 2020-09-17 NOTE — PROGRESS NOTES
Called and discussed results of his normal stress test.  Will call back once I have the results of his ZIO monitor.

## 2020-09-30 NOTE — PROGRESS NOTES
Called the patient and explained the benign results of his ZIO monitor.  He reports that he did have episodes of near syncope while wearing the monitor.  He denies any syncope while wearing the monitor.  He has had further syncopal episodes since I saw him last.  He reports these usually occur after standing or after taking a few steps after getting up.  Would like to monitor him for now.  We will see him back in 3 months.  I asked him to call me if he has any new or concerning issues prior to that.

## 2020-11-03 ENCOUNTER — OFFICE VISIT (OUTPATIENT)
Dept: ENDOCRINOLOGY | Age: 60
End: 2020-11-03

## 2020-11-03 VITALS
WEIGHT: 223.2 LBS | BODY MASS INDEX: 28.64 KG/M2 | DIASTOLIC BLOOD PRESSURE: 66 MMHG | RESPIRATION RATE: 16 BRPM | HEIGHT: 74 IN | SYSTOLIC BLOOD PRESSURE: 112 MMHG

## 2020-11-03 DIAGNOSIS — R53.82 CHRONIC FATIGUE: ICD-10-CM

## 2020-11-03 DIAGNOSIS — E03.9 ACQUIRED HYPOTHYROIDISM: Primary | ICD-10-CM

## 2020-11-03 PROCEDURE — 99214 OFFICE O/P EST MOD 30 MIN: CPT | Performed by: INTERNAL MEDICINE

## 2020-11-03 RX ORDER — LEVOTHYROXINE SODIUM 112 UG/1
112 TABLET ORAL DAILY
Qty: 30 TABLET | Refills: 11 | Status: SHIPPED | OUTPATIENT
Start: 2020-11-03 | End: 2021-02-09

## 2021-01-25 ENCOUNTER — TELEPHONE (OUTPATIENT)
Dept: ENDOCRINOLOGY | Age: 61
End: 2021-01-25

## 2021-02-09 RX ORDER — LEVOTHYROXINE SODIUM 112 MCG
TABLET ORAL
Qty: 30 TABLET | Refills: 10 | Status: SHIPPED | OUTPATIENT
Start: 2021-02-09 | End: 2021-05-17 | Stop reason: SDUPTHER

## 2021-02-11 ENCOUNTER — TELEPHONE (OUTPATIENT)
Dept: ENDOCRINOLOGY | Age: 61
End: 2021-02-11

## 2021-02-11 NOTE — TELEPHONE ENCOUNTER
Patient called stating that medication synthroid was not received by his pharmacy  I verbally called the prescription in to [facility] listed in his chart patient notifed

## 2021-02-22 ENCOUNTER — OFFICE VISIT (OUTPATIENT)
Dept: SPORTS MEDICINE | Facility: CLINIC | Age: 61
End: 2021-02-22

## 2021-02-22 VITALS
RESPIRATION RATE: 16 BRPM | HEART RATE: 78 BPM | SYSTOLIC BLOOD PRESSURE: 122 MMHG | HEIGHT: 74 IN | OXYGEN SATURATION: 97 % | BODY MASS INDEX: 28.62 KG/M2 | TEMPERATURE: 98.6 F | DIASTOLIC BLOOD PRESSURE: 72 MMHG | WEIGHT: 223 LBS

## 2021-02-22 DIAGNOSIS — M79.645 PAIN OF LEFT THUMB: Primary | ICD-10-CM

## 2021-02-22 DIAGNOSIS — M65.312 TRIGGER THUMB OF LEFT HAND: ICD-10-CM

## 2021-02-22 PROCEDURE — 99214 OFFICE O/P EST MOD 30 MIN: CPT | Performed by: FAMILY MEDICINE

## 2021-02-22 PROCEDURE — 73130 X-RAY EXAM OF HAND: CPT | Performed by: FAMILY MEDICINE

## 2021-02-22 NOTE — PROGRESS NOTES
"Chief Complaint  trigger finger (Trigger finger left thumg, tried one cortisone injection about a month ago, states the injection did not help for long. )    Subjective          Joel Keating presents to [facility]  History of Present Illness  One morning in December 2020 patient awoke with significant left thumb pain and it was \"locked\" in flexed position.  Patient received injection for trigger finger by his pain management specialist which gave him some relief for about 2 weeks but has returned.  Thumb is still painful over the palmar aspect of the A1 pulley but also over the MCP joint.     Objective   Vital Signs:   /72 (BP Location: Left arm, Patient Position: Sitting, Cuff Size: Adult)   Pulse 78   Temp 98.6 °F (37 °C)   Resp 16   Ht 188 cm (74\")   Wt 101 kg (223 lb)   SpO2 97%   BMI 28.63 kg/m²     Physical Exam  Vitals signs reviewed.   Constitutional:       Appearance: He is well-developed.   HENT:      Head: Normocephalic and atraumatic.   Eyes:      Conjunctiva/sclera: Conjunctivae normal.      Pupils: Pupils are equal, round, and reactive to light.   Cardiovascular:      Comments: No peripheral edema  Pulmonary:      Effort: Pulmonary effort is normal.   Musculoskeletal:      Comments: Left thumb in comparison to the right thumb does have a prominence of the first MCP and some tenderness to palpation here as well., has tenderness over the A1 pulley, no locking today   Skin:     General: Skin is warm and dry.   Neurological:      Mental Status: He is alert and oriented to person, place, and time.   Psychiatric:         Behavior: Behavior normal.      Left Hand X-Ray  Indication: Pain  AP, Lateral, and Oblique views    Findings:  There is mild OA first MCP and first CMC.    Result Review :                 Assessment and Plan    Diagnoses and all orders for this visit:    1. Pain of left thumb (Primary)  -     XR Hand 3+ View Left  -     Ambulatory Referral to " Hand Surgery    2. Trigger thumb of left hand  -     Ambulatory Referral to Hand Surgery    Discussed with the patient that since he received very little relief from trigger point injection and now also has pain in the MCP with some swelling will place referral to hand surgeon.  Have given him a copy of his x-rays from today.    Follow Up   No follow-ups on file.  Patient was given instructions and counseling regarding his condition or for health maintenance advice. Please see specific information pulled into the AVS if appropriate.

## 2021-03-12 ENCOUNTER — OFFICE VISIT (OUTPATIENT)
Dept: SLEEP MEDICINE | Facility: HOSPITAL | Age: 61
End: 2021-03-12

## 2021-03-12 VITALS — HEIGHT: 74 IN | BODY MASS INDEX: 27.59 KG/M2 | WEIGHT: 215 LBS

## 2021-03-12 DIAGNOSIS — G47.14 HYPERSOMNIA DUE TO MEDICAL CONDITION: ICD-10-CM

## 2021-03-12 DIAGNOSIS — G47.31 COMPLEX SLEEP APNEA SYNDROME: ICD-10-CM

## 2021-03-12 DIAGNOSIS — G47.33 OBSTRUCTIVE SLEEP APNEA, ADULT: ICD-10-CM

## 2021-03-12 DIAGNOSIS — G47.31 CSA (CENTRAL SLEEP APNEA): ICD-10-CM

## 2021-03-12 DIAGNOSIS — G47.21 CIRCADIAN RHYTHM SLEEP DISORDER, DELAYED SLEEP PHASE TYPE: ICD-10-CM

## 2021-03-12 DIAGNOSIS — G47.33 OSA (OBSTRUCTIVE SLEEP APNEA): Primary | ICD-10-CM

## 2021-03-12 PROCEDURE — 99214 OFFICE O/P EST MOD 30 MIN: CPT | Performed by: INTERNAL MEDICINE

## 2021-03-12 NOTE — PROGRESS NOTES
[facility] Note     Chief Complaint:  GAURANG     Primary Care Physician: Cesar Hoover MD    Interval History:   The patient is a 60 y.o. male  who I last saw 11/20/2019 and that note was reviewed.  The patient has GAURANG with complex sleep apnea previously treated with BiPAP auto SV.  During his last visit, and new interface selected.  The patient was to provide up-to-date downloads within the month but they were never provided.  The patient is here today with complaints of excessive sleepiness and insomnia.  He states he cannot fall asleep once he is in bed.  He goes to bed around 11 PM and does not fall asleep until 3 AM.  Once asleep he cannot stay asleep.  He will stay in bed until 11 AM through 3 PM.  Therefore, he is very tired all day.  [place] Sleepiness Scale is borderline normal at 7.    Patient has bilateral hip and spine pain.  The patient takes methadone 5 mg twice daily.  He takes gabapentin 1200 mg twice daily.    For sleep, the patient uses amitriptyline 20 or 30 mg at night and nortriptyline 50 mg at night.    The patient takes Vyvanse in the AM.    Due to lab abnormalities, the patient also takes Nu-Adapt and DHEA.    Review of Systems:    A complete review of systems was done and all were negative with the exception of post ansal drip and WHITLOCK and some depression    I reviewed all notes in Epic since I last saw the patient.    Social History:    Social History     Socioeconomic History   • Marital status:      Spouse name: Not on file   • Number of children: 2   • Years of education: Not on file   • Highest education level: Not on file   Tobacco Use   • Smoking status: Never Smoker   • Smokeless tobacco: Former User     Types: Chew   Vaping Use   • Vaping Use: Never used   Substance and Sexual Activity   • Alcohol use: No   • Drug use: No   • Sexual activity: Defer       Allergies:  Butorphanol, Hydroxyzine, Iodinated diagnostic agents, Iodine, Other, Phenytoin,  "Amoxicillin, and Clindamycin/lincomycin     Medication Review:  His list was reviewed.      Vital Signs:    Vitals:    03/12/21 0952   Weight: 97.5 kg (215 lb)   Height: 188 cm (74\")     Body mass index is 27.6 kg/m².    Physical Exam:    Constitutional:  Well developed 60 y.o. male that appears in no apparent distress.  Awake & oriented times 3.  Normal mood with normal recent and remote memory and normal judgement.  Eyes:  Conjunctivae normal.  Oropharynx: Moist mucous membranes without exudate and a large tongue and borderline enlarged uvula and class III MP airway  Neck: Trachea midline  Respiratory: Effort is not labored  Cardiovascular: Radial pulse regular  Musculoskeletal: Gait appears normal, no digital clubbing evident, no pre-tibial edema     The patient provided no downloads to review.     Impression:   Obstructive sleep apnea with complex sleep apnea previously treated with BiPAP auto SV.  The patient presents with symptoms and signs consistent with his known history of obstructive sleep apnea with complaints of hypersomnolence.  Circadian rhythm sleep disoder, delayed sleep phase type    Plan:  Good sleep hygiene measures should be maintained.  Some weight loss would be beneficial in this patient who is overweight by BMI.      I reviewed all with the patient. The patient's sleep hygiene is poor. The patient sleeps and wakes up and variable times. Additionally, the patient takes his medicines at variable times which contributes to the problems of delayed sleep phase.    1st, the patient needs a set sleep schedule and a set schedule for his medicines. Vyvanse, gabapentin and methadone should be given by his wife prior to leaving for work at 7-8 AM.   Melatonin 20 mg, nortriptyline 50 mg and methadone should be taken at 6 PM. Amitriptyline and gabapentin should be taken between 10-11 PM.     The patient has not used BiPAP auto SV since 2016. I would recommend repeat HST to re-evaluate for GAURANG/CSA. The " patient is agreeable to that.      I answered all of the patient's questions.  The patient will call for any problems and will follow up in 6-8 weeks.      Rajan Tamez MD  Sleep Medicine  03/22/21  10:11 EDT

## 2021-03-19 ENCOUNTER — BULK ORDERING (OUTPATIENT)
Dept: CASE MANAGEMENT | Facility: OTHER | Age: 61
End: 2021-03-19

## 2021-03-19 DIAGNOSIS — Z23 IMMUNIZATION DUE: ICD-10-CM

## 2021-03-22 PROBLEM — G47.21 CIRCADIAN RHYTHM SLEEP DISORDER, DELAYED SLEEP PHASE TYPE: Status: ACTIVE | Noted: 2021-03-22

## 2021-04-01 ENCOUNTER — TELEPHONE (OUTPATIENT)
Dept: SLEEP MEDICINE | Facility: HOSPITAL | Age: 61
End: 2021-04-01

## 2021-04-08 ENCOUNTER — APPOINTMENT (OUTPATIENT)
Dept: SLEEP MEDICINE | Facility: HOSPITAL | Age: 61
End: 2021-04-08

## 2021-05-17 ENCOUNTER — OFFICE VISIT (OUTPATIENT)
Dept: ENDOCRINOLOGY | Age: 61
End: 2021-05-17

## 2021-05-17 VITALS
OXYGEN SATURATION: 95 % | DIASTOLIC BLOOD PRESSURE: 60 MMHG | BODY MASS INDEX: 27.36 KG/M2 | SYSTOLIC BLOOD PRESSURE: 102 MMHG | WEIGHT: 213.2 LBS | HEART RATE: 72 BPM | HEIGHT: 74 IN

## 2021-05-17 DIAGNOSIS — R53.82 CHRONIC FATIGUE: ICD-10-CM

## 2021-05-17 DIAGNOSIS — R73.03 PRE-DIABETES: ICD-10-CM

## 2021-05-17 DIAGNOSIS — E03.9 ACQUIRED HYPOTHYROIDISM: Primary | ICD-10-CM

## 2021-05-17 PROCEDURE — 99214 OFFICE O/P EST MOD 30 MIN: CPT | Performed by: INTERNAL MEDICINE

## 2021-05-17 RX ORDER — LEVOTHYROXINE SODIUM 112 MCG
112 TABLET ORAL DAILY
Qty: 30 TABLET | Refills: 11 | Status: SHIPPED | OUTPATIENT
Start: 2021-05-17 | End: 2022-06-21

## 2021-05-17 RX ORDER — PRASTERONE (DHEA) 25 MG
1 CAPSULE ORAL DAILY
COMMUNITY
End: 2021-10-25

## 2021-05-17 RX ORDER — LEVOTHYROXINE SODIUM 112 MCG
112 TABLET ORAL DAILY
Qty: 90 TABLET | Refills: 3 | Status: SHIPPED | OUTPATIENT
Start: 2021-05-17 | End: 2021-05-17

## 2021-05-17 NOTE — PROGRESS NOTES
"Chief Complaint  Benign Prostatic Hypertrophy, Anxiety, impaired fasting glucose, Hypothyroidism, and renal insuf    Subjective            History of Present Illness  Joel Keating,60 y.o. is here as a follow-up for the evaluation of Hypothyroidism.      Hypothyroidism  Today in clinic patient reports that he is on Synthroid 112 mcg oral daily.  He is compliant with the medication and takes it on empty stomach but with the rest of his pills    He continues to report feeling exhausted, deals with fibromyalgia and sleep is disturbed because of the chronic pain.  He sees Dr. He-rheumatologist and also Dr. Roldan from infectious disease.  Does have family history of thyroid disease.     Hyperlipidemia- not on any medications      Borderline diabetes   Patient went for diabetic education and has been educated on diet changes and exercise regimen.      Fibromyalgia  Being followed by pain specialist     Reviewed primary care physician's/consulting physician documentation and lab results     I have reviewed the patient's allergies, medicines, past medical hx, family hx and social hx in detail.    Objective   Vital Signs:   /60 (BP Location: Left arm, Patient Position: Sitting, Cuff Size: Large Adult)   Pulse 72   Ht 188 cm (74.02\")   Wt 96.7 kg (213 lb 3.2 oz)   SpO2 95%   BMI 27.36 kg/m²     Physical Exam   General appearance - no distress  Eyes- anicteric sclera  Ear nose and throat-external ears and nose normal.    Respiratory-normal chest on inspection.  No respiratory distress noted.  Skin-no rashes.  Neuro-alert and oriented x3          Result Review :   The following data was reviewed by: Janelle Serna MD on 05/17/2021:  Results Encounter on 05/03/2021   Component Date Value Ref Range Status   • Hemoglobin A1C 05/06/2021 5.6  4.8 - 5.6 % Final    Comment:          Prediabetes: 5.7 - 6.4           Diabetes: >6.4           Glycemic control for adults with diabetes: <7.0     • Creatinine 05/06/2021 " 1.32* 0.76 - 1.27 mg/dL Final   • eGFR Non African Am 05/06/2021 57* >59 mL/min/1.73 Final   • eGFR African Am 05/06/2021 66  >59 mL/min/1.73 Final    Comment: **[facility] currently reports eGFR in compliance with the current**    recommendations of the National Kidney Foundation. [facility] will    update reporting as new guidelines are published from the NKF-ASN    Task force.     • Total Cholesterol 05/06/2021 156  100 - 199 mg/dL Final   • Triglycerides 05/06/2021 132  0 - 149 mg/dL Final   • HDL Cholesterol 05/06/2021 37* >39 mg/dL Final   • VLDL Cholesterol [place] 05/06/2021 24  5 - 40 mg/dL Final   • LDL Chol Calc ([facility]) 05/06/2021 95  0 - 99 mg/dL Final   • TSH 05/06/2021 0.867  0.450 - 4.500 uIU/mL Final   • Vitamin B-12 05/06/2021 931  232 - 1,245 pg/mL Final   • Folate 05/06/2021 >20.0  >3.0 ng/mL Final    Comment: A serum folate concentration of less than 3.1 ng/mL is  considered to represent clinical deficiency.     • 25 Hydroxy, Vitamin D 05/06/2021 48.3  30.0 - 100.0 ng/mL Final    Comment: Vitamin D deficiency has been defined by the [place] of  Medicine and an Endocrine Society practice guideline as a  level of serum 25-OH vitamin D less than 20 ng/mL (1,2).  The Endocrine Society went on to further define vitamin D  insufficiency as a level between 21 and 29 ng/mL (2).  1. IOM ([place] of Medicine). 2010. Dietary reference     intakes for calcium and D. [place]: The     National Academies Press.  2. Carlos MF, Vandana NC, Otilia-Nav RUSH, et al.     Evaluation, treatment, and prevention of vitamin D     deficiency: an Endocrine Society clinical practice     guideline. JCEM. 2011 Jul; 96(7):1911-30.     • Free T4 05/06/2021 1.27  0.82 - 1.77 ng/dL Final   • Interpretation 05/06/2021 Note   Final    Supplemental report is available.   • Interpretation 05/06/2021 Note   Final    Comment: -------------------------------  CHRONIC KIDNEY DISEASE:  EGFR, BLOOD PRESSURE, AND PROTEINURIA  ASSESSMENT  Most recent order does not include a Renal Panel.  EGFR, BLOOD PRESSURE, AND PROTEINURIA FOLLOW-UP  -  Spot Urine Panel is recommended by KDOQI guidelines, at  least yearly; fasting Renal Panel within 6 months;  BONE and MINERAL ASSESSMENT  Most recent order does not include a Renal Panel.  BONE and MINERAL FOLLOW-UP  -  fasting PTH with Renal Panel is due;  LIPIDS ASSESSMENT  LDL-C is normal and has risen, was 78 and now is 95 mg/dL.  Triglyceride is normal and has decreased, was 235 and now is  132 mg/dL. Non-HDL Cholesterol is normal and has not changed  significantly, was 125 and now is 119 mg/dL. HDL-C is low  and has risen, was 32 and now is 37 mg/dL.  LIPIDS TREATMENT SUGGESTIONS  -  Therapeutic lifestyle changes are always valuable to  maintain optimal blood lipid status (diet, exercise, weight  management). If at least a 50% LDL reduction from baseline  has not been achi                           eved, begin or increase statin. Consider  measurement of LDL particle number or Apo B to adjudicate  need for further LDL lowering therapy. If statin cannot be  tolerated or increased, alternatives include use of an  intestinal agent (ezetimibe or bile acid sequestrant) or  niacin.  LIPIDS FOLLOW-UP  -  fasting Lipid Panel within 12 months;  ANEMIA ASSESSMENT  Most recent order does not include a CBC Panel or iron  studies.  ANEMIA FOLLOW-UP  -  CBC is recommended by KDOQI guidelines, at least yearly;  -------------------------------  DISCLAIMER  These assessments and treatment suggestions are provided as  a convenience in support of the physician-patient  relationship and are not intended to replace the physician's  clinical judgment. They are derived from national guidelines  in addition to other evidence and expert opinion. The  clinician should consider this information within the  context of clinical opinion and the individual patient.  SEE GUIDANCE FOR CHRONIC KIDNEY DISEASE PROG                            JUAN CARLOS: Kidney  Disease Improving Global Outcomes (KDIGO) clinical practice  guidelines are at http://kdigo.org/home/guidelines/.  National Kidney Foundation Kidney Disease Outcomes Quality  Initiative (KDOQI (TM)), with its limitations and  disclaimers, are at www.kidney.org/professionals/KDOQI. This  program is intended for patients who have been diagnosed  with stages 3, 4, or pre-dialysis 5 CKD. It is not intended  for children, pregnant patients, or transplant patients.     • Specimen Status 05/06/2021 CANCELED   Final-Edited    Comment: Test Not Performed.  Patient was unable to void at the time of  collection.  The following test(s) were not performed:        TEST:  [id number]  Albumin/Creatinine Ratio,Urine    Result canceled by the ancillary.       Data reviewed: PCP and neurologist documentation        I reviewed the patient's new clinical results and mentioned them above in HPI and in plan as well.          Assessment and Plan    Problem List Items Addressed This Visit        Other    Fatigue    Relevant Orders    Basic Metabolic Panel    Hemoglobin A1c    Lipid Panel    Microalbumin / Creatinine Urine Ratio - Urine, Clean Catch    TSH    T4, Free    Vitamin D 25 Hydroxy    Hypothyroidism - Primary    Relevant Medications    Synthroid 112 MCG tablet    Other Relevant Orders    Basic Metabolic Panel    Hemoglobin A1c    Lipid Panel    Microalbumin / Creatinine Urine Ratio - Urine, Clean Catch    TSH    T4, Free    Vitamin D 25 Hydroxy      Other Visit Diagnoses     Pre-diabetes        Relevant Orders    Basic Metabolic Panel    Hemoglobin A1c    Lipid Panel    Microalbumin / Creatinine Urine Ratio - Urine, Clean Catch    TSH    T4, Free    Vitamin D 25 Hydroxy        Hypothyroidism  Continue Synthroid 112 mcg oral daily    Prediabetes  Follow dietary restrictions and exercise regimen.    Chronic fatigue  Noted that patient is on DHEA, advised the patient to discuss this with the pharmacy who has been  prescribing him with this medication given this is not the medication usually recommended and approved by the endocrine Society.    Patient was interested in testosterone replacement, counseled the patient about the risk of the medication rather than the benefit in his scenario.  And also told him that back when we checked his cortisol, testosterone levels they have been stable.    Follow Up   No follow-ups on file.    Refills/Meds sent to pharmacy    Interpreted the blood work-up/imaging results performed by the primary care/consulting physician -    Patient was given instructions and counseling regarding his condition or for health maintenance advice. Please see specific information pulled into the AVS if appropriate.

## 2021-07-13 ENCOUNTER — OFFICE VISIT (OUTPATIENT)
Dept: INTERNAL MEDICINE | Facility: CLINIC | Age: 61
End: 2021-07-13

## 2021-07-13 VITALS
BODY MASS INDEX: 27.05 KG/M2 | WEIGHT: 210.8 LBS | HEIGHT: 74 IN | HEART RATE: 92 BPM | SYSTOLIC BLOOD PRESSURE: 108 MMHG | DIASTOLIC BLOOD PRESSURE: 60 MMHG | OXYGEN SATURATION: 99 %

## 2021-07-13 DIAGNOSIS — J02.9 PHARYNGITIS, UNSPECIFIED ETIOLOGY: Primary | ICD-10-CM

## 2021-07-13 PROCEDURE — 99213 OFFICE O/P EST LOW 20 MIN: CPT | Performed by: FAMILY MEDICINE

## 2021-07-13 NOTE — PROGRESS NOTES
"Chief Complaint  sinus drainage and ears feel full    Subjective          Joel Keating presents to [facility]  History of Present Illness  Patient follows up for ongoing chronic rhinitis and pharyngitis for over a year no improvement with antihistamines nasal steroids as well as nasal ipratropium.  Does not complain any sinus congestion or ear pain he says most of his irritation is posterior  nasal drainage.  No fever sweats or chills no known allergies  Objective   Vital Signs:   /60 (BP Location: Left arm, Patient Position: Sitting, Cuff Size: Adult)   Pulse 92   Ht 188 cm (74.02\")   Wt 95.6 kg (210 lb 12.8 oz)   SpO2 99%   BMI 27.05 kg/m²     Physical Exam  HENT:      Head: Normocephalic and atraumatic.      Right Ear: Ear canal and external ear normal.      Left Ear: Ear canal and external ear normal.      Nose: No congestion or rhinorrhea.      Right Sinus: No maxillary sinus tenderness or frontal sinus tenderness.      Left Sinus: No maxillary sinus tenderness or frontal sinus tenderness.      Mouth/Throat:      Pharynx: Posterior oropharyngeal erythema present.   Eyes:      General: No scleral icterus.       Result Review :                 Assessment and Plan    Diagnoses and all orders for this visit:    1. Pharyngitis, unspecified etiology (Primary)  -     Ambulatory Referral to ENT (Otolaryngology)        Follow Up   Return if symptoms worsen or fail to improve, for Recheck.  Patient was given instructions and counseling regarding his condition or for health maintenance advice. Please see specific information pulled into the AVS if appropriate.       "

## 2021-08-24 ENCOUNTER — OFFICE VISIT (OUTPATIENT)
Dept: INTERNAL MEDICINE | Facility: CLINIC | Age: 61
End: 2021-08-24

## 2021-08-24 VITALS
OXYGEN SATURATION: 98 % | SYSTOLIC BLOOD PRESSURE: 108 MMHG | HEART RATE: 74 BPM | WEIGHT: 214.8 LBS | DIASTOLIC BLOOD PRESSURE: 64 MMHG | BODY MASS INDEX: 27.57 KG/M2 | HEIGHT: 74 IN

## 2021-08-24 DIAGNOSIS — Z00.00 HEALTH CARE MAINTENANCE: Primary | ICD-10-CM

## 2021-08-24 DIAGNOSIS — Z12.11 COLON CANCER SCREENING: ICD-10-CM

## 2021-08-24 PROCEDURE — 99396 PREV VISIT EST AGE 40-64: CPT | Performed by: FAMILY MEDICINE

## 2021-08-24 RX ORDER — NALOXONE HYDROCHLORIDE 4 MG/.1ML
SPRAY NASAL
COMMUNITY
Start: 2021-05-18

## 2021-08-24 NOTE — PROGRESS NOTES
Subjective   Joel Keating is a 61 y.o. male.     Chief Complaint   Patient presents with   • Annual Exam     Health maintenance    History of Present Illness   Joel Keating 61 y.o. male who presents for an Annual Wellness Visit.  he has a history of   Patient Active Problem List   Diagnosis   • Atopic rhinitis   • Anxiety   • Chronic coronary artery disease   • Benign prostatic hyperplasia with urinary obstruction   • Tenderness of lower extremity   • Degeneration of intervertebral disc of cervical region   • Depression   • Degeneration of intervertebral disc of lumbar region   • Fatigue   • Fibromyalgia   • Gastroparesis   • Herpes simplex virus (HSV) infection   • Impaired fasting glucose   • Insomnia   • Memory loss   • Onychomycosis of toenail   • Osteoarthritis of hip   • Peripheral neuropathy   • Hypothyroidism   • Obstructive sleep apnea, adult   • Annual physical exam   • Chronic pain syndrome   • Renal insufficiency   • Complex sleep apnea syndrome   • Hypersomnia due to medical condition   • CSA (central sleep apnea)   • Health care maintenance   • Precordial pain   • Circadian rhythm sleep disorder, delayed sleep phase type   • Colon cancer screening   .  he has been feeling exhausted.  Labs results discussed in detail with the patient.  Plan to update vaccines if needed today.  I  reviewed health maintenance with him as part of my preventative care plan.  Chronic medical problems monitored through other physicians no significant changes in his plan  Health Habits:  Dental Exam. up to date  Eye Exam. up to date  Exercise: 0 times/week.  Current exercise activities include: none      The following portions of the patient's history were reviewed and updated as appropriate: allergies, current medications, past family history, past medical history, past social history, past surgical history and problem list.  Preventative service    Review of Systems   Constitutional: Positive for fatigue.   HENT: Negative.     Eyes: Negative.    Respiratory: Negative.    Cardiovascular: Negative.    Gastrointestinal: Positive for constipation.   Endocrine: Negative.    Genitourinary: Negative.    Musculoskeletal: Positive for back pain and myalgias.   Skin: Negative.    Allergic/Immunologic: Positive for environmental allergies.   Neurological: Positive for weakness.   Hematological: Negative.    Psychiatric/Behavioral: Positive for dysphoric mood.       Objective   Physical Exam  Vitals and nursing note reviewed.   Constitutional:       Appearance: Normal appearance. He is well-developed. He is not diaphoretic.   HENT:      Head: Normocephalic and atraumatic.      Right Ear: Tympanic membrane, ear canal and external ear normal.      Left Ear: Tympanic membrane, ear canal and external ear normal.      Nose: No congestion.   Eyes:      General: Lids are normal. No scleral icterus.     Extraocular Movements: Extraocular movements intact.      Conjunctiva/sclera: Conjunctivae normal.   Neck:      Thyroid: No thyroid mass or thyromegaly.      Vascular: No carotid bruit or JVD.   Cardiovascular:      Rate and Rhythm: Normal rate and regular rhythm.      Pulses: Normal pulses.           Radial pulses are 2+ on the right side and 2+ on the left side.      Heart sounds: Normal heart sounds. No murmur heard.     Pulmonary:      Effort: Pulmonary effort is normal. No respiratory distress.      Breath sounds: Normal breath sounds.   Abdominal:      Palpations: Abdomen is soft.   Musculoskeletal:      Cervical back: Normal range of motion.      Right lower leg: No edema.      Left lower leg: No edema.      Comments: DECreased range of motion with tenderness and trigger points   Skin:     General: Skin is warm and dry.      Coloration: Skin is not pale.      Findings: No erythema or rash.   Neurological:      General: No focal deficit present.      Mental Status: He is alert and oriented to person, place, and time.      Sensory: No sensory deficit.       Deep Tendon Reflexes: Reflexes are normal and symmetric.   Psychiatric:         Mood and Affect: Mood normal.         Behavior: Behavior normal. Behavior is cooperative.         Thought Content: Thought content normal.         Judgment: Judgment normal.         Assessment/Plan   Diagnoses and all orders for this visit:    1. Health care maintenance (Primary)    2. Colon cancer screening  -     Ambulatory Referral to Gastroenterology    Recommend attempts at healthy lifestyle calorie appropriate diet regular physical activity although difficulty with underlying fibromyalgia however provided education regarding treatment of fibromyalgia including exercise with pain control  Recommended also to have shingles vaccine and screening colonoscopy  Follow-up otherwise as needed or annually on preventative basis

## 2021-10-25 ENCOUNTER — LAB (OUTPATIENT)
Dept: LAB | Facility: HOSPITAL | Age: 61
End: 2021-10-25

## 2021-10-25 ENCOUNTER — OFFICE VISIT (OUTPATIENT)
Dept: CARDIOLOGY | Facility: CLINIC | Age: 61
End: 2021-10-25

## 2021-10-25 VITALS
OXYGEN SATURATION: 98 % | HEART RATE: 69 BPM | WEIGHT: 214 LBS | DIASTOLIC BLOOD PRESSURE: 62 MMHG | SYSTOLIC BLOOD PRESSURE: 108 MMHG | HEIGHT: 74 IN | BODY MASS INDEX: 27.46 KG/M2

## 2021-10-25 DIAGNOSIS — G47.33 OBSTRUCTIVE SLEEP APNEA, ADULT: ICD-10-CM

## 2021-10-25 DIAGNOSIS — R55 SYNCOPE AND COLLAPSE: ICD-10-CM

## 2021-10-25 DIAGNOSIS — R55 SYNCOPE AND COLLAPSE: Primary | ICD-10-CM

## 2021-10-25 DIAGNOSIS — M79.7 FIBROMYALGIA: ICD-10-CM

## 2021-10-25 DIAGNOSIS — I25.10 CHRONIC CORONARY ARTERY DISEASE: ICD-10-CM

## 2021-10-25 LAB
ALBUMIN SERPL-MCNC: 4.7 G/DL (ref 3.5–5.2)
ALBUMIN/GLOB SERPL: 2.2 G/DL
ALP SERPL-CCNC: 94 U/L (ref 39–117)
ALT SERPL W P-5'-P-CCNC: 17 U/L (ref 1–41)
ANION GAP SERPL CALCULATED.3IONS-SCNC: 13.3 MMOL/L (ref 5–15)
AST SERPL-CCNC: 23 U/L (ref 1–40)
BASOPHILS # BLD AUTO: 0.03 10*3/MM3 (ref 0–0.2)
BASOPHILS NFR BLD AUTO: 0.5 % (ref 0–1.5)
BILIRUB SERPL-MCNC: 0.6 MG/DL (ref 0–1.2)
BUN SERPL-MCNC: 15 MG/DL (ref 8–23)
BUN/CREAT SERPL: 10.1 (ref 7–25)
CALCIUM SPEC-SCNC: 9.2 MG/DL (ref 8.6–10.5)
CHLORIDE SERPL-SCNC: 103 MMOL/L (ref 98–107)
CO2 SERPL-SCNC: 25.7 MMOL/L (ref 22–29)
CREAT SERPL-MCNC: 1.48 MG/DL (ref 0.76–1.27)
D DIMER PPP FEU-MCNC: 0.33 MCGFEU/ML (ref 0–0.49)
DEPRECATED RDW RBC AUTO: 46.3 FL (ref 37–54)
EOSINOPHIL # BLD AUTO: 0.43 10*3/MM3 (ref 0–0.4)
EOSINOPHIL NFR BLD AUTO: 7.2 % (ref 0.3–6.2)
ERYTHROCYTE [DISTWIDTH] IN BLOOD BY AUTOMATED COUNT: 13 % (ref 12.3–15.4)
GFR SERPL CREATININE-BSD FRML MDRD: 48 ML/MIN/1.73
GLOBULIN UR ELPH-MCNC: 2.1 GM/DL
GLUCOSE SERPL-MCNC: 87 MG/DL (ref 65–99)
HCT VFR BLD AUTO: 41.6 % (ref 37.5–51)
HGB BLD-MCNC: 13.9 G/DL (ref 13–17.7)
IMM GRANULOCYTES # BLD AUTO: 0.01 10*3/MM3 (ref 0–0.05)
IMM GRANULOCYTES NFR BLD AUTO: 0.2 % (ref 0–0.5)
LYMPHOCYTES # BLD AUTO: 1.64 10*3/MM3 (ref 0.7–3.1)
LYMPHOCYTES NFR BLD AUTO: 27.3 % (ref 19.6–45.3)
MCH RBC QN AUTO: 32 PG (ref 26.6–33)
MCHC RBC AUTO-ENTMCNC: 33.4 G/DL (ref 31.5–35.7)
MCV RBC AUTO: 95.9 FL (ref 79–97)
MONOCYTES # BLD AUTO: 0.44 10*3/MM3 (ref 0.1–0.9)
MONOCYTES NFR BLD AUTO: 7.3 % (ref 5–12)
NEUTROPHILS NFR BLD AUTO: 3.45 10*3/MM3 (ref 1.7–7)
NEUTROPHILS NFR BLD AUTO: 57.5 % (ref 42.7–76)
NRBC BLD AUTO-RTO: 0 /100 WBC (ref 0–0.2)
PLATELET # BLD AUTO: 217 10*3/MM3 (ref 140–450)
PMV BLD AUTO: 8.8 FL (ref 6–12)
POTASSIUM SERPL-SCNC: 4.5 MMOL/L (ref 3.5–5.2)
PROT SERPL-MCNC: 6.8 G/DL (ref 6–8.5)
RBC # BLD AUTO: 4.34 10*6/MM3 (ref 4.14–5.8)
SODIUM SERPL-SCNC: 142 MMOL/L (ref 136–145)
WBC # BLD AUTO: 6 10*3/MM3 (ref 3.4–10.8)

## 2021-10-25 PROCEDURE — 85379 FIBRIN DEGRADATION QUANT: CPT

## 2021-10-25 PROCEDURE — 36415 COLL VENOUS BLD VENIPUNCTURE: CPT

## 2021-10-25 PROCEDURE — 80053 COMPREHEN METABOLIC PANEL: CPT

## 2021-10-25 PROCEDURE — 99215 OFFICE O/P EST HI 40 MIN: CPT | Performed by: NURSE PRACTITIONER

## 2021-10-25 PROCEDURE — 93000 ELECTROCARDIOGRAM COMPLETE: CPT | Performed by: NURSE PRACTITIONER

## 2021-10-25 PROCEDURE — 85025 COMPLETE CBC W/AUTO DIFF WBC: CPT

## 2021-10-25 NOTE — PROGRESS NOTES
"    CARDIOLOGY        Patient Name: Joel Keating  :1960  Age: 61 y.o.  Primary Cardiologist: Shanta Johnson MD  Encounter Provider:  PEPPER Turpin    Date of Service: 10/25/21          CHIEF COMPLAINT / REASON FOR OFFICE VISIT     Precordial pain (1 yr f/u )      HISTORY OF PRESENT ILLNESS       HPI  Joel Keating is a 61 y.o. male who presents today for annual evaluation.     Pt has a  history significant for CAD, GERD, peripheral idiopathic neuropathy, fibromyalgia.    Patient presents today stating that he has not felt well for the past several weeks.  He reports increased fatigue as well as increased dyspnea with exertion.  He states that at times he also has shortness of breath at rest.  Reports sinus congestion and states that he has an appointment with ENT later this month.  He denies any fever.  He also notes that 4 days ago he experienced a syncopal episode without any aura.  He reports that he was walking to the bathroom turned around and the next thing he knew he woke up on the floor.  He did not have any chest discomfort before or after the episode.  He states that his largest complaint is worsening fatigue.      The following portions of the patient's history were reviewed and updated as appropriate: allergies, current medications, past family history, past medical history, past social history, past surgical history and problem list.      VITAL SIGNS     Visit Vitals  /62 (BP Location: Left arm, Patient Position: Sitting, Cuff Size: Large Adult)   Pulse 69   Ht 188 cm (74\")   Wt 97.1 kg (214 lb)   SpO2 98%   BMI 27.48 kg/m²         Wt Readings from Last 3 Encounters:   10/25/21 97.1 kg (214 lb)   21 97.4 kg (214 lb 12.8 oz)   21 95.6 kg (210 lb 12.8 oz)     Body mass index is 27.48 kg/m².      REVIEW OF SYSTEMS   Review of Systems   Constitutional: Positive for malaise/fatigue. Negative for chills, fever, weight gain and weight loss.   HENT: Positive for congestion.  "   Cardiovascular: Positive for dyspnea on exertion. Negative for leg swelling.   Respiratory: Positive for shortness of breath. Negative for cough, snoring and wheezing.    Hematologic/Lymphatic: Negative for bleeding problem. Does not bruise/bleed easily.   Skin: Negative for color change.   Musculoskeletal: Negative for falls, joint pain and myalgias.   Gastrointestinal: Negative for melena.   Genitourinary: Negative for hematuria.   Neurological: Negative for excessive daytime sleepiness.   Psychiatric/Behavioral: Negative for depression. The patient is not nervous/anxious.            PHYSICAL EXAMINATION     Constitutional:       Appearance: Normal appearance. Well-developed.   Eyes:      Conjunctiva/sclera: Conjunctivae normal.   Neck:      Vascular: No carotid bruit.   Pulmonary:      Effort: Pulmonary effort is normal.      Breath sounds: Normal breath sounds.   Cardiovascular:      Normal rate. Regular rhythm. Normal S1. Normal S2.      Murmurs: There is no murmur.      No gallop. No click. No rub.   Edema:     Peripheral edema absent.   Musculoskeletal: Normal range of motion. Skin:     General: Skin is warm and dry.   Neurological:      Mental Status: Alert and oriented to person, place, and time.      GCS: GCS eye subscore is 4. GCS verbal subscore is 5. GCS motor subscore is 6.   Psychiatric:         Speech: Speech normal.         Behavior: Behavior normal.         Thought Content: Thought content normal.         Judgment: Judgment normal.           REVIEWED DATA       ECG 12 Lead    Date/Time: 10/25/2021 11:00 AM  Performed by: Cherelle Buckner APRN  Authorized by: Cherelle Buckner APRN   Comparison: compared with previous ECG from 9/4/2020  Rhythm: sinus rhythm  Rate: normal  Conduction: conduction normal  ST Segments: ST segments normal  T Waves: T waves normal  QRS axis: normal    Clinical impression: normal ECG            Cardiac Procedures:  1. cardiac catheterization December 2014 with Dr. Ventura.   Mild coronary disease with 30% diffuse stenosis as well as 30-40% lesion to the RCA.  Small myocardial bridge in the LAD which was not significant.  2. Echocardiogram 3/12/2020.  LVEF 68%.  Grade 1 diastolic dysfunction.  Poor visualization of aortic and tricuspid valves.  3. ZIO monitor 9/30/2020.  Relatively benign monitor study.  4. Myocardial perfusion stress test 9/16/2020.  Normal myocardial perfusion study without evidence of ischemia.  Impressions consistent with low risk study.      Lipid Panel    Lipid Panel 5/6/21   Total Cholesterol 156   Triglycerides 132   HDL Cholesterol 37 (A)   VLDL Cholesterol 24   LDL Cholesterol  95   (A) Abnormal value            LABORATORY FINDINGS:     Results Review:    Results from last 7 days   Lab Units 10/25/21  1239   SODIUM mmol/L 142   POTASSIUM mmol/L 4.5   CHLORIDE mmol/L 103   CO2 mmol/L 25.7   BUN mg/dL 15   CREATININE mg/dL 1.48*   GLUCOSE mg/dL 87   CALCIUM mg/dL 9.2         Results from last 7 days   Lab Units 10/25/21  1239   WBC 10*3/mm3 6.00   HEMOGLOBIN g/dL 13.9   HEMATOCRIT % 41.6   PLATELETS 10*3/mm3 217                         Lab Results   Component Value Date     (H) 06/15/2020    BUN 15 10/25/2021    CREATININE 1.48 (H) 10/25/2021    CREATININE 1.30 12/30/2019    EGFRIFNONA 48 (L) 10/25/2021    EGFRIFAFRI 66 05/06/2021     10/25/2021    K 4.5 10/25/2021     10/25/2021    CO2 25.7 10/25/2021    CALCIUM 9.2 10/25/2021    ALBUMIN 4.70 10/25/2021    LABGLOBREF 2.2 02/14/2020    BILITOT 0.6 10/25/2021    ALKPHOS 94 10/25/2021    AST 23 10/25/2021    ALT 17 10/25/2021    CHLPL 156 05/06/2021    TRIG 132 05/06/2021    HDL 37 (L) 05/06/2021    VLDL 24 05/06/2021    LDL 95 05/06/2021    HGBA1C 5.6 05/06/2021    WBC 6.00 10/25/2021    RBC 4.34 10/25/2021    HCT 41.6 10/25/2021    MCV 95.9 10/25/2021    MCH 32.0 10/25/2021    MCHC 33.4 10/25/2021    RDW 13.0 10/25/2021    TSH 0.867 05/06/2021    PSA 0.558 11/15/2017          ASSESSMENT & PLAN       Diagnosis Plan   1. Syncope and collapse  Adult Transthoracic Echo Complete W/ Cont if Necessary Per Protocol    Holter Monitor - 72 Hour Up To 15 Days    CBC & Differential    Comprehensive Metabolic Panel    D-dimer, Quantitative   2. Chronic coronary artery disease     3. Obstructive sleep apnea, adult     4. Fibromyalgia           SUMMARY/DISCUSSION  1. Syncope.  4 days ago patient experienced a syncopal episode without any aura.  He has not had any further episodes of lightheadedness but does report generalized fatigue, worsening shortness of breath with exertion.  I have recommended placement of a ZIO monitor to assess for dysrhythmias that could cause syncope.  Patient states that in the past when he has worn these monitors the adhesive does not stick well and it typically falls off.  I would still like to capture as much rhythm data as I can and I am hopeful that this will work for the patient at this time.  Of also recommended echocardiogram to further assess the structure and function of the heart although this was normal in September 2020.  Patient will go for a CBC, CMP and D-dimer.  I would like to get a CTA of the chest however patient is allergic to IV contrast dye, if D-dimer is negative that I think that substantially rules out pulmonary embolism as source for PE.  I have told patient that I have questioned COVID-19 virus but symptoms have been ongoing for a few weeks, if his cardiac work-up is unremarkable then I have recommended that he follow-up with his PCP.  At time of dissertation CBC, CMP and D-dimer have resulted.  WBC, hemoglobin, hematocrit are normal.  Creatinine is elevated but baseline for patient at 1.48 and there are no other electrolyte abnormalities.  D-dimer is negative.  2. Chronic coronary disease.  Patient had cardiac catheterization in 2014 which revealed nonobstructive coronary disease.  Currently denies angina.  No ischemic changes on ECG  today.  3. GAURANG  4. Fibromyalgia  5. Follow-up to be arranged after above-mentioned testing    Time Spent: I spent 45 minutes caring for Joel on this date of service. This time includes time spent by me in the following activities: preparing for the visit, reviewing tests, obtaining and/or reviewing a separately obtained history, counseling and educating the patient/family/caregiver, ordering medications, tests, or procedures, referring and communicating with other health care professionals, documenting information in the medical record, independently interpreting results and communicating that information with the patient/family/caregiver and care coordination.   I spent 2 minutes on the separately reported service of ECG. This time is not included in the time used to support the E/M service also reported today.      MEDICATIONS         Discharge Medications          Accurate as of October 25, 2021 11:20 AM. If you have any questions, ask your nurse or doctor.            Continue These Medications      Instructions Start Date   amitriptyline 10 MG tablet  Commonly known as: ELAVIL   10 mg, Oral, Nightly      Carafate 1 GM/10ML suspension  Generic drug: sucralfate   10 mL, Oral, 4 Times Daily PRN      DHEA 25 MG capsule   1 capsule, Oral, Daily      fluticasone 50 MCG/ACT nasal spray  Commonly known as: FLONASE   1 spray, Nasal, 2 Times Daily, Administer 2 sprays in each nostril for each dose.       gabapentin 600 MG tablet  Commonly known as: NEURONTIN   TAKE 2 TABLETS BY MOUTH  TWICE A DAY. MAXIMUM DAILY  AMOUNT: 2400MG      Lexapro 20 MG tablet  Generic drug: escitalopram   1 tablet, Oral, Daily      methadone 5 MG tablet  Commonly known as: DOLOPHINE   5 mg, Oral, 2 Times Daily      MULTI COMPLETE PO   Oral      Narcan 4 MG/0.1ML nasal spray  Generic drug: naloxone   No dose, route, or frequency recorded.      nortriptyline 50 MG capsule  Commonly known as: PAMELOR   50 mg, Oral, Nightly      ondansetron 4 MG  tablet  Commonly known as: ZOFRAN   4 mg, Oral, Every 6 Hours PRN, Take for nausea and vomiting.       Senna 8.6 MG capsule   1 capsule, Oral, Daily      Synthroid 112 MCG tablet  Generic drug: levothyroxine   112 mcg, Oral, Daily      tamsulosin 0.4 MG capsule 24 hr capsule  Commonly known as: FLOMAX   1 capsule, Oral, Daily      tiZANidine 4 MG tablet  Commonly known as: ZANAFLEX   4 mg, Oral, Every 6 Hours PRN      Turmeric 450 MG capsule   1 capsule, Oral, Daily      Unable to find   Does not apply, Domperidone POWD; 1 tablet twice daily.      Vyvanse 70 MG capsule  Generic drug: lisdexamfetamine   1 capsule, Oral, Every Morning         Stop These Medications    albuterol sulfate  (90 Base) MCG/ACT inhaler  Commonly known as: PROVENTIL HFA;VENTOLIN HFA;PROAIR HFA  Stopped by: PEPPER Turpin                **Dragon Disclaimer:   Much of this encounter note is an electronic transcription/translation of spoken language to printed text. The electronic translation of spoken language may permit erroneous, or at times, nonsensical words or phrases to be inadvertently transcribed. Although I have reviewed the note for such errors, some may still exist.

## 2021-10-25 NOTE — PROGRESS NOTES
Can you please advise patient of normal lab results.  Please let him know that he is not anemic, and there is no elevation in white blood cell count, renal function is actually improved and stable for him.  The D-dimer test is negative so I do not think we need to proceed with a CTA of the chest.

## 2021-10-26 ENCOUNTER — TELEPHONE (OUTPATIENT)
Dept: CARDIOLOGY | Facility: CLINIC | Age: 61
End: 2021-10-26

## 2021-10-26 NOTE — TELEPHONE ENCOUNTER
----- Message from PEPPER Turpin sent at 10/25/2021  3:01 PM EDT -----  Can you please advise patient of normal lab results.  Please let him know that he is not anemic, and there is no elevation in white blood cell count, renal function is actually improved and stable for him.  The D-dimer test is negative so I do not think we need to proceed with a CTA of the chest.

## 2021-11-03 ENCOUNTER — HOSPITAL ENCOUNTER (OUTPATIENT)
Dept: CARDIOLOGY | Facility: HOSPITAL | Age: 61
Discharge: HOME | End: 2021-11-03
Admitting: NURSE PRACTITIONER

## 2021-11-03 VITALS
HEART RATE: 72 BPM | WEIGHT: 214 LBS | SYSTOLIC BLOOD PRESSURE: 108 MMHG | BODY MASS INDEX: 27.46 KG/M2 | HEIGHT: 74 IN | DIASTOLIC BLOOD PRESSURE: 62 MMHG

## 2021-11-03 DIAGNOSIS — R55 SYNCOPE AND COLLAPSE: ICD-10-CM

## 2021-11-03 LAB
AORTIC ARCH: 2.8 CM
ASCENDING AORTA: 3.6 CM
BH CV ECHO MEAS - ACS: 2.5 CM
BH CV ECHO MEAS - AO MAX PG: 5.7 MMHG
BH CV ECHO MEAS - AO MEAN PG: 2.8 MMHG
BH CV ECHO MEAS - AO ROOT AREA (BSA CORRECTED): 1.8
BH CV ECHO MEAS - AO ROOT AREA: 12.3 CM^2
BH CV ECHO MEAS - AO ROOT DIAM: 4 CM
BH CV ECHO MEAS - AO V2 MAX: 119.9 CM/SEC
BH CV ECHO MEAS - AO V2 MEAN: 76.1 CM/SEC
BH CV ECHO MEAS - AO V2 VTI: 19.8 CM
BH CV ECHO MEAS - ASC AORTA: 3.6 CM
BH CV ECHO MEAS - BSA(HAYCOCK): 2.3 M^2
BH CV ECHO MEAS - BSA: 2.2 M^2
BH CV ECHO MEAS - BZI_BMI: 27.5 KILOGRAMS/M^2
BH CV ECHO MEAS - BZI_METRIC_HEIGHT: 188 CM
BH CV ECHO MEAS - BZI_METRIC_WEIGHT: 97.1 KG
BH CV ECHO MEAS - EDV(CUBED): 121.2 ML
BH CV ECHO MEAS - EDV(MOD-SP2): 67 ML
BH CV ECHO MEAS - EDV(MOD-SP4): 79 ML
BH CV ECHO MEAS - EDV(TEICH): 115.5 ML
BH CV ECHO MEAS - EF(CUBED): 48.7 %
BH CV ECHO MEAS - EF(MOD-BP): 57.7 %
BH CV ECHO MEAS - EF(MOD-SP2): 59.7 %
BH CV ECHO MEAS - EF(MOD-SP4): 58.2 %
BH CV ECHO MEAS - EF(TEICH): 40.7 %
BH CV ECHO MEAS - ESV(CUBED): 62.2 ML
BH CV ECHO MEAS - ESV(MOD-SP2): 27 ML
BH CV ECHO MEAS - ESV(MOD-SP4): 33 ML
BH CV ECHO MEAS - ESV(TEICH): 68.4 ML
BH CV ECHO MEAS - FS: 19.9 %
BH CV ECHO MEAS - IVS/LVPW: 1
BH CV ECHO MEAS - IVSD: 1.1 CM
BH CV ECHO MEAS - LAT PEAK E' VEL: 9.4 CM/SEC
BH CV ECHO MEAS - LV DIASTOLIC VOL/BSA (35-75): 35.3 ML/M^2
BH CV ECHO MEAS - LV MASS(C)D: 199 GRAMS
BH CV ECHO MEAS - LV MASS(C)DI: 89 GRAMS/M^2
BH CV ECHO MEAS - LV MAX PG: 3 MMHG
BH CV ECHO MEAS - LV MEAN PG: 1 MMHG
BH CV ECHO MEAS - LV SYSTOLIC VOL/BSA (12-30): 14.8 ML/M^2
BH CV ECHO MEAS - LV V1 MAX: 78 CM/SEC
BH CV ECHO MEAS - LV V1 VTI: 15 CM
BH CV ECHO MEAS - LVIDD: 4.9 CM
BH CV ECHO MEAS - LVIDS: 4 CM
BH CV ECHO MEAS - LVLD AP2: 8.2 CM
BH CV ECHO MEAS - LVLD AP4: 8 CM
BH CV ECHO MEAS - LVLS AP2: 6.3 CM
BH CV ECHO MEAS - LVLS AP4: 6.8 CM
BH CV ECHO MEAS - LVOT AREA (M): 4.5 CM^2
BH CV ECHO MEAS - LVOT AREA: 4.5 CM^2
BH CV ECHO MEAS - LVOT DIAM: 2.4 CM
BH CV ECHO MEAS - LVPWD: 1.1 CM
BH CV ECHO MEAS - MED PEAK E' VEL: 9.1 CM/SEC
BH CV ECHO MEAS - MV A DUR: 0.12 SEC
BH CV ECHO MEAS - MV A MAX VEL: 71.1 CM/SEC
BH CV ECHO MEAS - MV DEC SLOPE: 249.9 CM/SEC^2
BH CV ECHO MEAS - MV DEC TIME: 0.18 SEC
BH CV ECHO MEAS - MV E MAX VEL: 51.4 CM/SEC
BH CV ECHO MEAS - MV E/A: 0.72
BH CV ECHO MEAS - MV MAX PG: 3.4 MMHG
BH CV ECHO MEAS - MV MEAN PG: 1.6 MMHG
BH CV ECHO MEAS - MV P1/2T MAX VEL: 51.4 CM/SEC
BH CV ECHO MEAS - MV P1/2T: 60.3 MSEC
BH CV ECHO MEAS - MV V2 MAX: 91.7 CM/SEC
BH CV ECHO MEAS - MV V2 MEAN: 61.9 CM/SEC
BH CV ECHO MEAS - MV V2 VTI: 18.8 CM
BH CV ECHO MEAS - MVA P1/2T LCG: 4.3 CM^2
BH CV ECHO MEAS - MVA(P1/2T): 3.7 CM^2
BH CV ECHO MEAS - PA MAX PG (FULL): 10.2 MMHG
BH CV ECHO MEAS - PA MAX PG: 13.8 MMHG
BH CV ECHO MEAS - PA V2 MAX: 185.4 CM/SEC
BH CV ECHO MEAS - PULM A REVS DUR: 0.11 SEC
BH CV ECHO MEAS - PULM A REVS VEL: 39.4 CM/SEC
BH CV ECHO MEAS - PULM DIAS VEL: 38.6 CM/SEC
BH CV ECHO MEAS - PULM S/D: 1.3
BH CV ECHO MEAS - PULM SYS VEL: 48.4 CM/SEC
BH CV ECHO MEAS - PVA(V,A): 1.3 CM^2
BH CV ECHO MEAS - PVA(V,D): 1.3 CM^2
BH CV ECHO MEAS - RV MAX PG: 3.5 MMHG
BH CV ECHO MEAS - RV MEAN PG: 1.9 MMHG
BH CV ECHO MEAS - RV V1 MAX: 94.1 CM/SEC
BH CV ECHO MEAS - RV V1 MEAN: 60.4 CM/SEC
BH CV ECHO MEAS - RV V1 VTI: 14.7 CM
BH CV ECHO MEAS - RVOT AREA: 2.5 CM^2
BH CV ECHO MEAS - RVOT DIAM: 1.8 CM
BH CV ECHO MEAS - SI(AO): 109.4 ML/M^2
BH CV ECHO MEAS - SI(CUBED): 26.4 ML/M^2
BH CV ECHO MEAS - SI(MOD-SP2): 17.9 ML/M^2
BH CV ECHO MEAS - SI(MOD-SP4): 20.6 ML/M^2
BH CV ECHO MEAS - SI(TEICH): 21 ML/M^2
BH CV ECHO MEAS - SV(AO): 244.7 ML
BH CV ECHO MEAS - SV(CUBED): 59 ML
BH CV ECHO MEAS - SV(MOD-SP2): 40 ML
BH CV ECHO MEAS - SV(MOD-SP4): 46 ML
BH CV ECHO MEAS - SV(RVOT): 37.4 ML
BH CV ECHO MEAS - SV(TEICH): 47 ML
BH CV ECHO MEAS - TAPSE (>1.6): 2.7 CM
BH CV ECHO MEAS - TR MAX VEL: 267.3 CM/SEC
BH CV ECHO MEASUREMENTS AVERAGE E/E' RATIO: 5.56
BH CV XLRA - RV BASE: 2.9 CM
BH CV XLRA - RV LENGTH: 6.2 CM
BH CV XLRA - RV MID: 2.4 CM
BH CV XLRA - TDI S': 12.7 CM/SEC
LEFT ATRIUM VOLUME INDEX: 19 ML/M2
LV EF 2D ECHO EST: 58 %
SINUS: 3.6 CM
STJ: 3.1 CM

## 2021-11-03 PROCEDURE — 93306 TTE W/DOPPLER COMPLETE: CPT

## 2021-11-03 PROCEDURE — 93306 TTE W/DOPPLER COMPLETE: CPT | Performed by: INTERNAL MEDICINE

## 2021-11-04 NOTE — PROGRESS NOTES
Patient made aware of echocardiogram results.  Dilation of the aorta is similar to measurements on echocardiogram in 2019.  Consider CTA of the chest next year for routine surveillance.

## 2021-11-19 ENCOUNTER — TELEPHONE (OUTPATIENT)
Dept: INTERNAL MEDICINE | Facility: CLINIC | Age: 61
End: 2021-11-19

## 2021-11-19 NOTE — TELEPHONE ENCOUNTER
Caller: Joel Keating    Relationship: Self    Best call back number:[phone]       Who are you requesting to speak with (clinical staff, provider,  specific staff member):     HELEN    Do you require a callback: YES        PATIENT WOULD LIKE FOR HELEN TO RETURN HIS CALL REGARDING SOME PAPER WORK.

## 2021-11-24 ENCOUNTER — OFFICE VISIT (OUTPATIENT)
Dept: INTERNAL MEDICINE | Facility: CLINIC | Age: 61
End: 2021-11-24

## 2021-11-24 VITALS
SYSTOLIC BLOOD PRESSURE: 130 MMHG | HEIGHT: 74 IN | HEART RATE: 74 BPM | OXYGEN SATURATION: 99 % | BODY MASS INDEX: 29.2 KG/M2 | WEIGHT: 227.5 LBS | DIASTOLIC BLOOD PRESSURE: 76 MMHG

## 2021-11-24 DIAGNOSIS — G47.14 HYPERSOMNIA DUE TO MEDICAL CONDITION: ICD-10-CM

## 2021-11-24 DIAGNOSIS — M79.7 FIBROMYALGIA: Primary | ICD-10-CM

## 2021-11-24 DIAGNOSIS — N64.9 LESION OF BREAST: ICD-10-CM

## 2021-11-24 DIAGNOSIS — M50.30 DEGENERATION OF INTERVERTEBRAL DISC OF CERVICAL REGION: ICD-10-CM

## 2021-11-24 DIAGNOSIS — G89.4 CHRONIC PAIN SYNDROME: ICD-10-CM

## 2021-11-24 DIAGNOSIS — M51.36 DEGENERATION OF INTERVERTEBRAL DISC OF LUMBAR REGION: ICD-10-CM

## 2021-11-24 PROCEDURE — 99214 OFFICE O/P EST MOD 30 MIN: CPT | Performed by: FAMILY MEDICINE

## 2021-11-24 RX ORDER — EPINEPHRINE 0.3 MG/.3ML
INJECTION SUBCUTANEOUS
COMMUNITY
Start: 2021-10-27

## 2021-11-24 RX ORDER — KETOROLAC TROMETHAMINE 10 MG/1
TABLET, FILM COATED ORAL
COMMUNITY
Start: 2021-11-18 | End: 2022-03-07

## 2021-11-24 NOTE — PROGRESS NOTES
"Chief Complaint  disability form    Subjective          Joel Keating presents to [facility]  History of Present Illness  Patient comes in to discuss his ongoing requirement to submit documentation for his disability related to fibromyalgia chronic pain syndrome degenerative disc intervertebral disc disease cervical and lumbar regions  he is followed mostly through pain management, and says he is in daily pain  He has intermittent consultations with neurology as well  Concerned because he felt the nodule in his left breast approximately 2 weeks ago it is nontender  Objective   Vital Signs:   /76 (BP Location: Right arm, Patient Position: Sitting, Cuff Size: Adult)   Pulse 74   Ht 188 cm (74\")   Wt 103 kg (227 lb 8 oz)   SpO2 99%   BMI 29.21 kg/m²     Physical Exam  Chest:            Result Review :                 Assessment and Plan    Diagnoses and all orders for this visit:    1. Fibromyalgia (Primary)    2. Chronic pain syndrome    3. Degeneration of intervertebral disc of cervical region    4. Degeneration of intervertebral disc of lumbar region    5. Hypersomnia due to medical condition    6. Lesion of breast    Patient will monitor breast lesion over the next 2 to 3 months and if it is growing in size would recommend imaging  Continue present intervention and plan for ongoing treatment of his fibromyalgia and chronic pain syndrome  Mentation provider as patient requests for present disability status  I spent 30 minutes caring for Joel on this date of service. This time includes time spent by me in the following activities:preparing for the visit, reviewing tests, performing a medically appropriate examination and/or evaluation , counseling and educating the patient/family/caregiver and documenting information in the medical record  Follow Up   Return in about 3 months (around 2/24/2022), or if symptoms worsen or fail to improve, for Recheck.  Patient was given " instructions and counseling regarding his condition or for health maintenance advice. Please see specific information pulled into the AVS if appropriate.

## 2022-03-07 ENCOUNTER — OFFICE VISIT (OUTPATIENT)
Dept: ENDOCRINOLOGY | Age: 62
End: 2022-03-07

## 2022-03-07 VITALS
DIASTOLIC BLOOD PRESSURE: 74 MMHG | OXYGEN SATURATION: 96 % | BODY MASS INDEX: 28.64 KG/M2 | HEIGHT: 74 IN | HEART RATE: 107 BPM | WEIGHT: 223.2 LBS | SYSTOLIC BLOOD PRESSURE: 109 MMHG

## 2022-03-07 DIAGNOSIS — E03.9 ACQUIRED HYPOTHYROIDISM: Primary | ICD-10-CM

## 2022-03-07 DIAGNOSIS — R73.03 PRE-DIABETES: ICD-10-CM

## 2022-03-07 DIAGNOSIS — R53.82 CHRONIC FATIGUE: ICD-10-CM

## 2022-03-07 PROCEDURE — 99214 OFFICE O/P EST MOD 30 MIN: CPT | Performed by: INTERNAL MEDICINE

## 2022-03-07 NOTE — PROGRESS NOTES
"Chief Complaint  Hypothyroidism and chronic fatigue     Subjective            History of Present Illness  Joel Keating,61 y.o. is here as a Hypothyroidism and chronic fatigue    Today in clinic patient reports that he is on Synthroid 112 mcg oral daily.  He is compliant with the medication and takes it on empty stomach but with the rest of his pills     He still complains of feeling extremely exhausted, feels horrible, has been dealing with fibromyalgia and disturbed sleep due to chronic pain.  He sees Dr. He-rheumatologist and also Dr. Roldan from infectious disease.  Does have family history of thyroid disease.  Because of his above symptoms he is taking otc supplements- cumin, turmeric to help with his symptoms.      Hyperlipidemia- not on any medications      Borderline diabetes   Patient went for diabetic education and has been educated on diet changes and exercise regimen.      Fibromyalgia  Being followed by pain specialist.     Reviewed primary care physician's/consulting physician documentation and lab results     I have reviewed the patient's allergies, medicines, past medical hx, family hx and social hx in detail.    Objective   Vital Signs:   /74   Pulse 107   Ht 188 cm (74\")   Wt 101 kg (223 lb 3.2 oz)   SpO2 96%   BMI 28.66 kg/m²     Physical Exam   General appearance - no distress  Eyes- anicteric sclera  Ear nose and throat-external ears and nose normal.    Respiratory-normal chest on inspection.  No respiratory distress noted.  Skin-no rashes.  Neuro-alert and oriented x3            Result Review :   The following data was reviewed by: Janelle Serna MD on 03/07/2022:  Results Encounter on 02/17/2022   Component Date Value Ref Range Status   • Glucose 03/01/2022 158 (A) 65 - 99 mg/dL Final   • BUN 03/01/2022 12  8 - 27 mg/dL Final   • Creatinine 03/01/2022 1.42 (A) 0.76 - 1.27 mg/dL Final   • EGFR Result 03/01/2022 56 (A) >59 mL/min/1.73 Final    Comment: **In accordance with " recommendations from the NKF-ASN Task force,**    [facility] has updated its eGFR calculation to the 2021 CKD-EPI    creatinine equation that estimates kidney function without a race    variable.     • BUN/Creatinine Ratio 03/01/2022 8 (A) 10 - 24 Final   • Sodium 03/01/2022 140  134 - 144 mmol/L Final   • Potassium 03/01/2022 4.1  3.5 - 5.2 mmol/L Final   • Chloride 03/01/2022 101  96 - 106 mmol/L Final   • Total CO2 03/01/2022 23  20 - 29 mmol/L Final   • Calcium 03/01/2022 9.0  8.6 - 10.2 mg/dL Final   • Hemoglobin A1C 03/01/2022 5.7 (A) 4.8 - 5.6 % Final    Comment:          Prediabetes: 5.7 - 6.4           Diabetes: >6.4           Glycemic control for adults with diabetes: <7.0     • Total Cholesterol 03/01/2022 173  100 - 199 mg/dL Final   • Triglycerides 03/01/2022 246 (A) 0 - 149 mg/dL Final   • HDL Cholesterol 03/01/2022 35 (A) >39 mg/dL Final   • VLDL Cholesterol [place] 03/01/2022 42 (A) 5 - 40 mg/dL Final   • LDL Chol Calc (NIH) 03/01/2022 96  0 - 99 mg/dL Final   • Creatinine, Urine 03/01/2022 CANCELED  mg/dL Final-Edited    Comment: Test not performed. Patient was unable to provide a self-collected  specimen for the requested testing. The following test(s) were not  performed:    Result canceled by the ancillary.     • Microalbumin, Urine 03/01/2022 CANCELED   Final-Edited    Comment: Test not performed    Result canceled by the ancillary.     • TSH 03/01/2022 3.170  0.450 - 4.500 uIU/mL Final   • Free T4 03/01/2022 1.30  0.82 - 1.77 ng/dL Final   • 25 Hydroxy, Vitamin D 03/01/2022 48.8  30.0 - 100.0 ng/mL Final    Comment: Vitamin D deficiency has been defined by the [place] of  Medicine and an Endocrine Society practice guideline as a  level of serum 25-OH vitamin D less than 20 ng/mL (1,2).  The Endocrine Society went on to further define vitamin D  insufficiency as a level between 21 and 29 ng/mL (2).  1. IOM ([place] of Medicine). 2010. Dietary reference     intakes for calcium and D. [place]:  The     National Academies Press.  2. Carlos MF, Vandana AUSTIN, Ramon RUSH, et al.     Evaluation, treatment, and prevention of vitamin D     deficiency: an Endocrine Society clinical practice     guideline. JCEM. 2011 Jul; 96(7):1911-30.     • Interpretation 03/01/2022 Note   Final    Supplemental report is available.   • Specimen Status 03/01/2022 CANCELED   Final-Edited    Comment: Test not performed. Patient was unable to provide a self-collected  specimen for the requested testing. The following test(s) were not  performed:        TEST:  [id number]  Albumin/Creatinine Ratio,Urine    Result canceled by the ancillary.       Data reviewed: pcp notes        I reviewed the patient's new clinical results and mentioned them above in HPI and in plan as well.          Assessment and Plan    Problem List Items Addressed This Visit        Other    Fatigue    Relevant Orders    TSH    Lipid Panel    T4, Free    TSH    T4, Free    T3, Free    Basic Metabolic Panel    Hemoglobin A1c    Lipid Panel    Vitamin B12 & Folate    Vitamin D 25 Hydroxy    Hypothyroidism - Primary    Relevant Orders    TSH    Lipid Panel    T4, Free    TSH    T4, Free    T3, Free    Basic Metabolic Panel    Hemoglobin A1c    Lipid Panel    Vitamin B12 & Folate    Vitamin D 25 Hydroxy      Other Visit Diagnoses     Pre-diabetes        Relevant Orders    TSH    Lipid Panel    T4, Free    TSH    T4, Free    T3, Free    Basic Metabolic Panel    Hemoglobin A1c    Lipid Panel    Vitamin B12 & Folate    Vitamin D 25 Hydroxy        Hypothyroidism - chronic  On synthroid 112 mcg oral daily.     Fibromyalgia - worse  Seeing pain specialist     Pre - dm - following diet and exercise.     Follow Up   No follow-ups on file.    Refills/Meds sent to pharmacy    Interpreted the blood work-up/imaging results performed by the primary care/consulting physician -    Patient was given instructions and counseling regarding his condition or for health maintenance advice.  Please see specific information pulled into the AVS if appropriate.

## 2022-06-21 RX ORDER — LEVOTHYROXINE SODIUM 112 MCG
TABLET ORAL
Qty: 30 TABLET | Refills: 11 | Status: SHIPPED | OUTPATIENT
Start: 2022-06-21

## 2022-06-28 ENCOUNTER — LAB (OUTPATIENT)
Dept: ENDOCRINOLOGY | Age: 62
End: 2022-06-28

## 2022-06-28 DIAGNOSIS — E03.9 ACQUIRED HYPOTHYROIDISM: ICD-10-CM

## 2022-06-28 DIAGNOSIS — R73.03 PRE-DIABETES: ICD-10-CM

## 2022-06-28 DIAGNOSIS — R53.82 CHRONIC FATIGUE: ICD-10-CM

## 2022-06-29 LAB
25(OH)D3+25(OH)D2 SERPL-MCNC: 47 NG/ML (ref 30–100)
BUN SERPL-MCNC: 11 MG/DL (ref 8–27)
BUN/CREAT SERPL: 7 (ref 10–24)
CALCIUM SERPL-MCNC: 9.6 MG/DL (ref 8.6–10.2)
CHLORIDE SERPL-SCNC: 102 MMOL/L (ref 96–106)
CHOLEST SERPL-MCNC: 209 MG/DL (ref 100–199)
CO2 SERPL-SCNC: 23 MMOL/L (ref 20–29)
CREAT SERPL-MCNC: 1.5 MG/DL (ref 0.76–1.27)
EGFRCR SERPLBLD CKD-EPI 2021: 52 ML/MIN/1.73
FOLATE SERPL-MCNC: >20 NG/ML
GLUCOSE SERPL-MCNC: 105 MG/DL (ref 65–99)
HBA1C MFR BLD: 5.8 % (ref 4.8–5.6)
HDLC SERPL-MCNC: 44 MG/DL
IMP & REVIEW OF LAB RESULTS: NORMAL
LDLC SERPL CALC-MCNC: 135 MG/DL (ref 0–99)
POTASSIUM SERPL-SCNC: 4.2 MMOL/L (ref 3.5–5.2)
REPORT: NORMAL
SODIUM SERPL-SCNC: 140 MMOL/L (ref 134–144)
T3FREE SERPL-MCNC: 2.8 PG/ML (ref 2–4.4)
T4 FREE SERPL-MCNC: 0.99 NG/DL (ref 0.82–1.77)
TRIGL SERPL-MCNC: 169 MG/DL (ref 0–149)
TSH SERPL DL<=0.005 MIU/L-ACNC: 3.63 UIU/ML (ref 0.45–4.5)
VIT B12 SERPL-MCNC: 926 PG/ML (ref 232–1245)
VLDLC SERPL CALC-MCNC: 30 MG/DL (ref 5–40)

## 2022-09-12 ENCOUNTER — OFFICE VISIT (OUTPATIENT)
Dept: INTERNAL MEDICINE | Facility: CLINIC | Age: 62
End: 2022-09-12

## 2022-09-12 VITALS
WEIGHT: 228 LBS | OXYGEN SATURATION: 97 % | DIASTOLIC BLOOD PRESSURE: 64 MMHG | BODY MASS INDEX: 29.26 KG/M2 | HEIGHT: 74 IN | HEART RATE: 98 BPM | SYSTOLIC BLOOD PRESSURE: 116 MMHG

## 2022-09-12 DIAGNOSIS — Z00.00 HEALTH CARE MAINTENANCE: Primary | ICD-10-CM

## 2022-09-12 DIAGNOSIS — M25.551 RIGHT HIP PAIN: ICD-10-CM

## 2022-09-12 DIAGNOSIS — Z12.11 COLON CANCER SCREENING: ICD-10-CM

## 2022-09-12 PROCEDURE — 99396 PREV VISIT EST AGE 40-64: CPT | Performed by: FAMILY MEDICINE

## 2022-09-12 RX ORDER — FAMOTIDINE 20 MG/1
20 TABLET, FILM COATED ORAL 2 TIMES DAILY
COMMUNITY

## 2022-09-12 NOTE — PROGRESS NOTES
Subjective   Joel Keating is a 62 y.o. male.     Chief Complaint   Patient presents with   • Annual Exam     Health maintenance    History of Present Illness   Joel Keating 62 y.o. male who presents for an Annual Wellness Visit.  he has a history of   Patient Active Problem List   Diagnosis   • Atopic rhinitis   • Anxiety   • Chronic coronary artery disease   • Benign prostatic hyperplasia with urinary obstruction   • Tenderness of lower extremity   • Degeneration of intervertebral disc of cervical region   • Depression   • Degeneration of intervertebral disc of lumbar region   • Fatigue   • Fibromyalgia   • Gastroparesis   • Herpes simplex virus (HSV) infection   • Impaired fasting glucose   • Insomnia   • Memory loss   • Onychomycosis of toenail   • Osteoarthritis of hip   • Peripheral neuropathy   • Hypothyroidism   • Obstructive sleep apnea, adult   • Annual physical exam   • Chronic pain syndrome   • Renal insufficiency   • Complex sleep apnea syndrome   • Hypersomnia due to medical condition   • CSA (central sleep apnea)   • Health care maintenance   • Precordial pain   • Circadian rhythm sleep disorder, delayed sleep phase type   • Colon cancer screening   • Lesion of breast   .  he has been feeling poorly.   I  reviewed health maintenance with him as part of my preventative care plan.  He is followed by neurology orthopedics pain management gastroenterology urology    The following portions of the patient's history were reviewed and updated as appropriate: allergies, current medications, past family history, past medical history, past social history, past surgical history and problem list.  Has regular dental and eye exam  Review of Systems   Constitutional: Positive for fatigue.   HENT: Negative.    Eyes: Negative.    Respiratory: Negative.    Cardiovascular: Negative.    Gastrointestinal: Positive for constipation.   Genitourinary: Negative.    Musculoskeletal: Positive for back pain and neck pain.    Skin: Negative.    Neurological: Positive for dizziness, tremors and weakness.   Psychiatric/Behavioral: Positive for sleep disturbance. The patient is nervous/anxious.        Objective   Physical Exam  Vitals and nursing note reviewed.   Constitutional:       Appearance: Normal appearance. He is well-developed. He is not diaphoretic.   HENT:      Head: Normocephalic and atraumatic.      Right Ear: Tympanic membrane, ear canal and external ear normal.      Left Ear: Tympanic membrane, ear canal and external ear normal.   Eyes:      General: Lids are normal. No scleral icterus.     Extraocular Movements: Extraocular movements intact.      Conjunctiva/sclera: Conjunctivae normal.   Neck:      Thyroid: No thyroid mass or thyromegaly.      Vascular: No carotid bruit or JVD.   Cardiovascular:      Rate and Rhythm: Normal rate and regular rhythm.      Pulses: Normal pulses.           Radial pulses are 2+ on the right side and 2+ on the left side.      Heart sounds: Normal heart sounds. No murmur heard.  Pulmonary:      Effort: Pulmonary effort is normal. No respiratory distress.      Breath sounds: Normal breath sounds.   Abdominal:      Palpations: Abdomen is soft.   Musculoskeletal:         General: Tenderness present.      Cervical back: Normal range of motion.      Right lower leg: No edema.      Left lower leg: No edema.   Skin:     General: Skin is warm and dry.      Coloration: Skin is not pale.      Findings: No erythema or rash.   Neurological:      General: No focal deficit present.      Mental Status: He is alert and oriented to person, place, and time.      Sensory: No sensory deficit.      Deep Tendon Reflexes: Reflexes are normal and symmetric.   Psychiatric:         Mood and Affect: Mood normal.         Behavior: Behavior normal. Behavior is cooperative.         Thought Content: Thought content normal.         Judgment: Judgment normal.         Assessment & Plan   Diagnoses and all orders for this  visit:    1. Health care maintenance (Primary)    2. Right hip pain  -     Ambulatory Referral For Screening Colonoscopy  -     Ambulatory Referral to Orthopedic Surgery    3. Colon cancer screening  -     Ambulatory Referral For Screening Colonoscopy      Continue attempts at healthy lifestyle calorie appropriate diet and regular physical activity  Patient is confined by chronic pain syndrome  Education provided regarding immunizations and early detection for cancer  Follow-up as needed otherwise preventatively annually

## 2022-09-19 ENCOUNTER — TELEPHONE (OUTPATIENT)
Dept: CARDIOLOGY | Facility: CLINIC | Age: 62
End: 2022-09-19

## 2022-09-19 NOTE — TELEPHONE ENCOUNTER
[phone]  4:15 pm      Pt called and lm that he needs to schedule an appt due to fatigue and dizziness he has been having x a few days.    Scheduled for 9/20 @ 12:45pm-mel w info for pt and to call if that didn't work for him.    [facility]

## 2022-09-20 ENCOUNTER — OFFICE VISIT (OUTPATIENT)
Dept: CARDIOLOGY | Facility: CLINIC | Age: 62
End: 2022-09-20

## 2022-09-20 VITALS
BODY MASS INDEX: 29.16 KG/M2 | HEART RATE: 102 BPM | HEIGHT: 74 IN | WEIGHT: 227.2 LBS | OXYGEN SATURATION: 98 % | DIASTOLIC BLOOD PRESSURE: 77 MMHG | SYSTOLIC BLOOD PRESSURE: 130 MMHG

## 2022-09-20 DIAGNOSIS — I25.10 CHRONIC CORONARY ARTERY DISEASE: ICD-10-CM

## 2022-09-20 DIAGNOSIS — F33.1 MODERATE EPISODE OF RECURRENT MAJOR DEPRESSIVE DISORDER: ICD-10-CM

## 2022-09-20 DIAGNOSIS — R53.83 OTHER FATIGUE: ICD-10-CM

## 2022-09-20 DIAGNOSIS — R07.2 PRECORDIAL PAIN: ICD-10-CM

## 2022-09-20 DIAGNOSIS — F41.9 ANXIETY: ICD-10-CM

## 2022-09-20 DIAGNOSIS — M79.7 FIBROMYALGIA: ICD-10-CM

## 2022-09-20 DIAGNOSIS — G47.33 OBSTRUCTIVE SLEEP APNEA, ADULT: ICD-10-CM

## 2022-09-20 DIAGNOSIS — I25.10 CHRONIC CORONARY ARTERY DISEASE: Primary | ICD-10-CM

## 2022-09-20 DIAGNOSIS — R06.09 DYSPNEA ON EXERTION: ICD-10-CM

## 2022-09-20 DIAGNOSIS — E03.9 ACQUIRED HYPOTHYROIDISM: ICD-10-CM

## 2022-09-20 PROCEDURE — 93000 ELECTROCARDIOGRAM COMPLETE: CPT | Performed by: INTERNAL MEDICINE

## 2022-09-20 PROCEDURE — 99214 OFFICE O/P EST MOD 30 MIN: CPT | Performed by: INTERNAL MEDICINE

## 2022-09-20 NOTE — PROGRESS NOTES
Subjective:     Encounter Date:09/20/2022      Patient ID: Joel Keating is a 62 y.o. male.    Chief Complaint:  History of Present Illness    This is a 62-year-old with mild nonobstructive coronary artery disease, fibromyalgia, anxiety, hypothyroidism, who presents for follow-up and evaluation of chest pain, shortness of breath, fatigue, and palpitations.    He returns today for follow-up and evaluation of the above symptoms.  He reports that since he was last seen in the office his symptoms of fatigue have progressed.  He also reports episodes of chest pressure that have been going on for the last couple of months.  This occur several times over the course of the day but are not continuous.  Symptoms occur both at rest and with activity.  They are not exacerbated or relieved by any triggers.  He reports frequent palpitations.  He gets lightheaded with position changes.  Denies any near-syncope or syncope.  He has no lower extremity edema on exam.  He reports she is getting tired and out of breath with any kind of activity.  He reports that although his symptoms outside of the chest pain have been going on for several months they have worsened recently.    Prior History:  Patient was previously followed by Dr. Ventura.  He began seeing Dr. Ventura in 2014 when he presented to the hospital with symptoms of unstable angina.  He underwent a cardiac catheterization at that time that showed mild nonobstructive coronary artery disease and small myocardial bridge of the LAD.  Since then he has followed up with Dr. Ventura on a yearly basis.  He was last seen by Dr. Ventura in 7/2017 at which time he appeared to be stable.  He was last seen in the office by ELIAS Huynh and 8/2019 at which time he appeared to be stable from a cardiac standpoint.    Exam initially in 9/2020 when he presented for follow-up.  He reported at that office visit that he had began seeing Dr. Mckenna with infectious disease in 12/2019 for daily mild  fevers.  His work-up included an echocardiogram which was performed on 3/2020 which showed normal left ventricular systolic function, EF of 60%, grade 1 diastolic dysfunction, no significant valvular disease.  At the time of his office visit he reported episodes of daily chest pressure radiating to his jaw and left shoulder and associated with diaphoresis.  Symptoms do not extremely occur with exertion.  He also reported an episode of syncope while raising from a seated position.  Their office visit recommended proceeding with further work-up with a stress test and a ZIO monitor.  ZIO monitor was unremarkable.  Her stress test showed no evidence of ischemia.    Patient was not seen back in follow-up until 10/2021 when he was evaluated by JULIANN Veloz  Who presented to the office at that time with complaints of a syncopal episode without any preceding symptoms.  His ZIO monitor and echocardiogram were recommended at that time.  His ZIO monitor was unremarkable.  His echocardiogram continues showed normal left ventricular systolic function wall motion with EF of 58%, grade 1 diastolic dysfunction, no significant valvular disease, and borderline dilatation of his aorta with an aortic root of 4 cm.  He also had lab work performed at that office visit including a normal D-dimer.        Review of Systems   Constitutional: Positive for malaise/fatigue.   HENT: Negative for hearing loss, hoarse voice, nosebleeds and sore throat.    Eyes: Negative for pain.   Cardiovascular: Positive for chest pain, dyspnea on exertion and palpitations. Negative for claudication, cyanosis, irregular heartbeat, leg swelling, near-syncope, orthopnea, paroxysmal nocturnal dyspnea and syncope.   Respiratory: Negative for shortness of breath and snoring.    Endocrine: Negative for cold intolerance, heat intolerance, polydipsia, polyphagia and polyuria.   Skin: Negative for itching and rash.   Musculoskeletal: Negative for arthritis,  falls, joint pain, joint swelling, muscle cramps, muscle weakness and myalgias.   Gastrointestinal: Negative for constipation, diarrhea, dysphagia, heartburn, hematemesis, hematochezia, melena, nausea and vomiting.   Genitourinary: Negative for frequency, hematuria and hesitancy.   Neurological: Positive for light-headedness. Negative for excessive daytime sleepiness, dizziness, headaches, numbness and weakness.   Psychiatric/Behavioral: Negative for depression. The patient is not nervous/anxious.          Current Outpatient Medications:   •  ALLERGY SERUM SUBLINGUAL DROPS, Place  under the tongue Daily., Disp: , Rfl:   •  amitriptyline (ELAVIL) 10 MG tablet, Take 10 mg by mouth Every Night., Disp: , Rfl:   •  EPINEPHrine (EPIPEN) 0.3 MG/0.3ML solution auto-injector injection, , Disp: , Rfl:   •  escitalopram (LEXAPRO) 20 MG tablet, Take 1 tablet by mouth daily., Disp: , Rfl:   •  famotidine (PEPCID) 20 MG tablet, Take 1 tablet by mouth 2 (Two) Times a Day., Disp: , Rfl:   •  fluticasone (FLONASE) 50 MCG/ACT nasal spray, 1 spray into each nostril 2 (two) times a day. Administer 2 sprays in each nostril for each dose., Disp: 3 each, Rfl: 3  •  gabapentin (NEURONTIN) 600 MG tablet, TAKE 2 TABLETS BY MOUTH  TWICE A DAY. MAXIMUM DAILY  AMOUNT: 2400MG, Disp: , Rfl:   •  methadone (DOLOPHINE) 5 MG tablet, Take 5 mg by mouth 2 (Two) Times a Day., Disp: , Rfl:   •  Multiple Vitamins-Minerals (MULTI COMPLETE PO), Take  by mouth., Disp: , Rfl:   •  Narcan 4 MG/0.1ML nasal spray, , Disp: , Rfl:   •  ondansetron (ZOFRAN) 4 MG tablet, Take 4 mg by mouth Every 6 (Six) Hours As Needed for Nausea or Vomiting. Take for nausea and vomiting., Disp: , Rfl:   •  Sennosides (SENNA) 8.6 MG capsule, Take 1 capsule by mouth Daily., Disp: , Rfl:   •  sucralfate (CARAFATE) 1 GM/10ML suspension, Take 10 mL by mouth 4 (four) times a day as needed., Disp: , Rfl:   •  Synthroid 112 MCG tablet, TAKE ONE TABLET BY MOUTH DAILY, Disp: 30 tablet, Rfl:  11  •  tamsulosin (FLOMAX) 0.4 MG capsule 24 hr capsule, Take 1 capsule by mouth daily., Disp: , Rfl:   •  tiZANidine (ZANAFLEX) 4 MG tablet, Take 4 mg by mouth Every 6 (Six) Hours As Needed., Disp: , Rfl:   •  Unable to find, Domperidone POWD; 1 tablet twice daily., Disp: , Rfl:     Current Facility-Administered Medications:   •  diphenhydrAMINE (BENADRYL) injection 50 mg, 50 mg, Intravenous, Once, Tony Mckenna MD    Past Medical History:   Diagnosis Date   • Abdominal pain     Recurrent sx and follows with Dr. Heard.   • Abscess of buttock    • Acute gastritis    • Allergic rhinitis    • Alopecia    • Angina pectoris (HCC)    • Anxiety    • Arteriosclerotic coronary artery disease    • Benign prostatic hypertrophy (BPH) with nocturia    • Calf tenderness    • Cervical neck pain with evidence of disc disease    • CSA (central sleep apnea) 09/14/2015    Overnight polysomnogram.  Weight 205 pounds.  AHI severely abnormal at 58 events per hour.  Central sleep apnea index 34 events per hour.  Obstructive sleep apnea index 24 events per hour.  No sleep-related hypoxia.  Auto SV titration performed 11/2/2015.   • DDD (degenerative disc disease), lumbar    • Degeneration of cervical intervertebral disc    • Depression    • Esophageal reflux     Follows with Dr. Jackson   • Fatigue    • Fever 04/2015    Low grade symptoms of fever for 3 weeks.   • Fibromyalgia    • Gastroparesis    • Health care maintenance    • Heart palpitations    • History of deep vein thrombophlebitis of lower extremity 2003    DVT Dx 2003   • Hypokalemia    • Hypothyroidism    • Idiopathic peripheral neuropathy    • Impaired fasting glucose    • Insomnia    • Lichen planus     tongue   • LOM (loss of memory)    • Mental status change     Episodes where he stares ahead and have some shaking of his limbs.  Possible seizure activity.  Referred to Dr. Santana, October 2, 2013.   • Nausea    • Onychomycosis of toenail    • GAURANG (obstructive  "sleep apnea) 05/08/2007    Overnight polysomnogram.  Weight 247 pounds.  AHI mildly abnormal at 11 events per hour without sleep-related hypoxia noted.   • Osteoarthritis of hip    • PN (peripheral neuropathy)    • Primary hypothyroidism    • Rash    • Tendonitis of left hip    • Tendonitis of right hip    • TIA (transient ischemic attack) 06/2016   • Tinea versicolor        Past Surgical History:   Procedure Laterality Date   • APPENDECTOMY     • BACK SURGERY      Cervical   • CHOLECYSTECTOMY     • JOINT REPLACEMENT Bilateral     Hip   • LUMBAR FUSION     • SURAL NERVE/MUSCLE BIOPSY  04/2016   • TONSILLECTOMY         Family History   Problem Relation Age of Onset   • Heart failure Mother         Congestive   • Heart disease Mother    • Atrial fibrillation Mother    • Alcohol abuse Paternal Uncle    • Heart failure Paternal Grandmother         Congestive   • No Known Problems Father    • No Known Problems Sister    • No Known Problems Brother    • Heart failure Maternal Grandmother    • Heart disease Maternal Grandmother    • No Known Problems Maternal Grandfather    • No Known Problems Paternal Grandfather        Social History     Tobacco Use   • Smoking status: Never Smoker   • Smokeless tobacco: Former User     Types: Chew   Vaping Use   • Vaping Use: Never used   Substance Use Topics   • Alcohol use: No   • Drug use: No         ECG 12 Lead    Date/Time: 9/20/2022 2:42 PM  Performed by: Shanta Johnson MD  Authorized by: Shanta Johnson MD   Comparison: compared with previous ECG   Comparison to previous ECG: Rate has increased  Rhythm: sinus tachycardia  QRS axis: left                 Objective:     Visit Vitals  /77 (BP Location: Right arm, Patient Position: Sitting, Cuff Size: Adult)   Pulse 102   Ht 188 cm (74\")   Wt 103 kg (227 lb 3.2 oz)   SpO2 98%   BMI 29.17 kg/m²         Constitutional:       Appearance: Normal appearance. Well-developed.   HENT:      Head: Normocephalic and atraumatic.   Neck: "      Vascular: No carotid bruit or JVD.   Pulmonary:      Effort: Pulmonary effort is normal.      Breath sounds: Normal breath sounds.   Cardiovascular:      Tachycardia present. Regular rhythm.      No gallop.   Pulses:     Radial: 2+ bilaterally.  Edema:     Peripheral edema absent.   Abdominal:      Palpations: Abdomen is soft.   Skin:     General: Skin is warm and dry.   Neurological:      Mental Status: Alert and oriented to person, place, and time.             Assessment:          Diagnosis Plan   1. Non-obstructive coronary artery disease     2. Precordial pain     3. Acquired hypothyroidism     4. Fibromyalgia     5. Moderate episode of recurrent major depressive disorder (HCC)     6. Anxiety     7. Obstructive sleep apnea, adult            Plan:       1.  Dyspnea/fatigue/chest pressure.  Symptoms sound atypical but in light of his history including that of nonobstructive coronary artery disease I think we should proceed with further ischemic work-up.  We will start with noninvasive testing with a stress test.  Also check an echocardiogram to ensure that he has not developed any structural heart disease since his last echocardiogram.  2.  Mild nonobstructive coronary artery disease.  Noted on his prior cardiac catheterization in 2014.  Plan as per #1.  3.  Hypothyroidism  4.  Fibromyalgia  5.  Depression/anxiety  6.  Obstructive sleep apnea    I will call and discuss results of his stress test and echocardiogram and determine further recommendations based on those results.

## 2022-10-05 ENCOUNTER — HOSPITAL ENCOUNTER (OUTPATIENT)
Dept: CARDIOLOGY | Facility: HOSPITAL | Age: 62
Discharge: HOME | End: 2022-10-05
Admitting: INTERNAL MEDICINE

## 2022-10-05 ENCOUNTER — APPOINTMENT (OUTPATIENT)
Dept: GENERAL RADIOLOGY | Facility: HOSPITAL | Age: 62
End: 2022-10-05

## 2022-10-05 ENCOUNTER — HOSPITAL ENCOUNTER (OUTPATIENT)
Facility: HOSPITAL | Age: 62
Discharge: HOME | End: 2022-10-06
Attending: EMERGENCY MEDICINE | Admitting: INTERNAL MEDICINE

## 2022-10-05 VITALS
DIASTOLIC BLOOD PRESSURE: 80 MMHG | BODY MASS INDEX: 29.13 KG/M2 | HEIGHT: 74 IN | WEIGHT: 227 LBS | HEART RATE: 65 BPM | SYSTOLIC BLOOD PRESSURE: 130 MMHG

## 2022-10-05 VITALS — HEIGHT: 74 IN | BODY MASS INDEX: 29.14 KG/M2 | WEIGHT: 227.07 LBS

## 2022-10-05 DIAGNOSIS — I25.10 CHRONIC CORONARY ARTERY DISEASE: ICD-10-CM

## 2022-10-05 DIAGNOSIS — R53.83 OTHER FATIGUE: ICD-10-CM

## 2022-10-05 DIAGNOSIS — R07.2 PRECORDIAL PAIN: ICD-10-CM

## 2022-10-05 DIAGNOSIS — R06.09 DYSPNEA ON EXERTION: ICD-10-CM

## 2022-10-05 DIAGNOSIS — R07.89 CHEST PRESSURE: Primary | ICD-10-CM

## 2022-10-05 LAB
ALBUMIN SERPL-MCNC: 5.3 G/DL (ref 3.5–5.2)
ALBUMIN/GLOB SERPL: 2.1 G/DL
ALP SERPL-CCNC: 111 U/L (ref 39–117)
ALT SERPL W P-5'-P-CCNC: 21 U/L (ref 1–41)
ANION GAP SERPL CALCULATED.3IONS-SCNC: 14.8 MMOL/L (ref 5–15)
AORTIC ARCH: 2.9 CM
ASCENDING AORTA: 4 CM
AST SERPL-CCNC: 17 U/L (ref 1–40)
BASOPHILS # BLD AUTO: 0.03 10*3/MM3 (ref 0–0.2)
BASOPHILS NFR BLD AUTO: 0.4 % (ref 0–1.5)
BH CV ECHO LEFT VENTRICLE GLOBAL LONGITUDINAL STRAIN: -14.5 %
BH CV ECHO MEAS - ACS: 2.09 CM
BH CV ECHO MEAS - AO MAX PG: 4.1 MMHG
BH CV ECHO MEAS - AO MEAN PG: 1.9 MMHG
BH CV ECHO MEAS - AO ROOT DIAM: 3.4 CM
BH CV ECHO MEAS - AO V2 MAX: 101.8 CM/SEC
BH CV ECHO MEAS - AO V2 VTI: 21.7 CM
BH CV ECHO MEAS - AVA(I,D): 3.2 CM2
BH CV ECHO MEAS - EDV(CUBED): 97.6 ML
BH CV ECHO MEAS - EDV(MOD-SP2): 83 ML
BH CV ECHO MEAS - EDV(MOD-SP4): 98 ML
BH CV ECHO MEAS - EF(MOD-BP): 57.8 %
BH CV ECHO MEAS - EF(MOD-SP2): 57.8 %
BH CV ECHO MEAS - EF(MOD-SP4): 56.1 %
BH CV ECHO MEAS - ESV(CUBED): 31.9 ML
BH CV ECHO MEAS - ESV(MOD-SP2): 35 ML
BH CV ECHO MEAS - ESV(MOD-SP4): 43 ML
BH CV ECHO MEAS - FS: 31.1 %
BH CV ECHO MEAS - IVS/LVPW: 0.83 CM
BH CV ECHO MEAS - IVSD: 0.86 CM
BH CV ECHO MEAS - LAT PEAK E' VEL: 7 CM/SEC
BH CV ECHO MEAS - LV DIASTOLIC VOL/BSA (35-75): 42.7 CM2
BH CV ECHO MEAS - LV MASS(C)D: 147.4 GRAMS
BH CV ECHO MEAS - LV MAX PG: 2.05 MMHG
BH CV ECHO MEAS - LV MEAN PG: 1.11 MMHG
BH CV ECHO MEAS - LV SYSTOLIC VOL/BSA (12-30): 18.8 CM2
BH CV ECHO MEAS - LV V1 MAX: 71.6 CM/SEC
BH CV ECHO MEAS - LV V1 VTI: 17 CM
BH CV ECHO MEAS - LVIDD: 4.6 CM
BH CV ECHO MEAS - LVIDS: 3.2 CM
BH CV ECHO MEAS - LVOT AREA: 4 CM2
BH CV ECHO MEAS - LVOT DIAM: 2.27 CM
BH CV ECHO MEAS - LVPWD: 1.03 CM
BH CV ECHO MEAS - MED PEAK E' VEL: 6.4 CM/SEC
BH CV ECHO MEAS - MR MAX PG: 35.3 MMHG
BH CV ECHO MEAS - MR MAX VEL: 297.3 CM/SEC
BH CV ECHO MEAS - MV A DUR: 0.15 SEC
BH CV ECHO MEAS - MV A MAX VEL: 59.1 CM/SEC
BH CV ECHO MEAS - MV DEC SLOPE: 135.5 CM/SEC2
BH CV ECHO MEAS - MV DEC TIME: 0.36 MSEC
BH CV ECHO MEAS - MV E MAX VEL: 34.3 CM/SEC
BH CV ECHO MEAS - MV E/A: 0.58
BH CV ECHO MEAS - MV MAX PG: 1.49 MMHG
BH CV ECHO MEAS - MV MEAN PG: 0.6 MMHG
BH CV ECHO MEAS - MV P1/2T: 111.5 MSEC
BH CV ECHO MEAS - MV V2 VTI: 20.6 CM
BH CV ECHO MEAS - MVA(P1/2T): 1.97 CM2
BH CV ECHO MEAS - MVA(VTI): 3.3 CM2
BH CV ECHO MEAS - PA ACC TIME: 0.06 SEC
BH CV ECHO MEAS - PA PR(ACCEL): 50.5 MMHG
BH CV ECHO MEAS - PA V2 MAX: 114 CM/SEC
BH CV ECHO MEAS - PULM A REVS DUR: 0.15 SEC
BH CV ECHO MEAS - PULM A REVS VEL: 37.9 CM/SEC
BH CV ECHO MEAS - PULM DIAS VEL: 36.2 CM/SEC
BH CV ECHO MEAS - PULM S/D: 0.99
BH CV ECHO MEAS - PULM SYS VEL: 35.8 CM/SEC
BH CV ECHO MEAS - QP/QS: 0.6
BH CV ECHO MEAS - RAP SYSTOLE: 8 MMHG
BH CV ECHO MEAS - RV MAX PG: 1.04 MMHG
BH CV ECHO MEAS - RV V1 MAX: 51 CM/SEC
BH CV ECHO MEAS - RV V1 VTI: 11.3 CM
BH CV ECHO MEAS - RVOT DIAM: 2.16 CM
BH CV ECHO MEAS - RVSP: 29 MMHG
BH CV ECHO MEAS - SI(MOD-SP2): 20.9 ML/M2
BH CV ECHO MEAS - SI(MOD-SP4): 24 ML/M2
BH CV ECHO MEAS - SV(LVOT): 69 ML
BH CV ECHO MEAS - SV(MOD-SP2): 48 ML
BH CV ECHO MEAS - SV(MOD-SP4): 55 ML
BH CV ECHO MEAS - SV(RVOT): 41.5 ML
BH CV ECHO MEAS - TAPSE (>1.6): 2.03 CM
BH CV ECHO MEAS - TR MAX PG: 21 MMHG
BH CV ECHO MEAS - TR MAX VEL: 229.4 CM/SEC
BH CV ECHO MEASUREMENTS AVERAGE E/E' RATIO: 5.12
BH CV XLRA - RV BASE: 3.5 CM
BH CV XLRA - RV LENGTH: 7.9 CM
BH CV XLRA - RV MID: 2.14 CM
BH CV XLRA - TDI S': 10.8 CM/SEC
BILIRUB SERPL-MCNC: 0.6 MG/DL (ref 0–1.2)
BUN SERPL-MCNC: 18 MG/DL (ref 8–23)
BUN/CREAT SERPL: 13.3 (ref 7–25)
CALCIUM SPEC-SCNC: 10.5 MG/DL (ref 8.6–10.5)
CHLORIDE SERPL-SCNC: 100 MMOL/L (ref 98–107)
CO2 SERPL-SCNC: 25.2 MMOL/L (ref 22–29)
CREAT SERPL-MCNC: 1.35 MG/DL (ref 0.76–1.27)
D DIMER PPP FEU-MCNC: 0.34 MCGFEU/ML (ref 0–0.49)
DEPRECATED RDW RBC AUTO: 45.9 FL (ref 37–54)
EGFRCR SERPLBLD CKD-EPI 2021: 58.6 ML/MIN/1.73
EOSINOPHIL # BLD AUTO: 0.15 10*3/MM3 (ref 0–0.4)
EOSINOPHIL NFR BLD AUTO: 1.8 % (ref 0.3–6.2)
ERYTHROCYTE [DISTWIDTH] IN BLOOD BY AUTOMATED COUNT: 13.2 % (ref 12.3–15.4)
GLOBULIN UR ELPH-MCNC: 2.5 GM/DL
GLUCOSE SERPL-MCNC: 101 MG/DL (ref 65–99)
HCT VFR BLD AUTO: 46.3 % (ref 37.5–51)
HGB BLD-MCNC: 15.9 G/DL (ref 13–17.7)
IMM GRANULOCYTES # BLD AUTO: 0.03 10*3/MM3 (ref 0–0.05)
IMM GRANULOCYTES NFR BLD AUTO: 0.4 % (ref 0–0.5)
LEFT ATRIUM VOLUME INDEX: 15.4 ML/M2
LIPASE SERPL-CCNC: 29 U/L (ref 13–60)
LYMPHOCYTES # BLD AUTO: 3.58 10*3/MM3 (ref 0.7–3.1)
LYMPHOCYTES NFR BLD AUTO: 42.9 % (ref 19.6–45.3)
MAXIMAL PREDICTED HEART RATE: 156 BPM
MCH RBC QN AUTO: 32.3 PG (ref 26.6–33)
MCHC RBC AUTO-ENTMCNC: 34.3 G/DL (ref 31.5–35.7)
MCV RBC AUTO: 93.9 FL (ref 79–97)
MONOCYTES # BLD AUTO: 0.55 10*3/MM3 (ref 0.1–0.9)
MONOCYTES NFR BLD AUTO: 6.6 % (ref 5–12)
NEUTROPHILS NFR BLD AUTO: 4.01 10*3/MM3 (ref 1.7–7)
NEUTROPHILS NFR BLD AUTO: 47.9 % (ref 42.7–76)
NRBC BLD AUTO-RTO: 0 /100 WBC (ref 0–0.2)
NT-PROBNP SERPL-MCNC: 42.8 PG/ML (ref 0–900)
PLATELET # BLD AUTO: 249 10*3/MM3 (ref 140–450)
PMV BLD AUTO: 8.6 FL (ref 6–12)
POTASSIUM SERPL-SCNC: 3.9 MMOL/L (ref 3.5–5.2)
PROT SERPL-MCNC: 7.8 G/DL (ref 6–8.5)
QT INTERVAL: 349 MS
RBC # BLD AUTO: 4.93 10*6/MM3 (ref 4.14–5.8)
SINUS: 4 CM
SODIUM SERPL-SCNC: 140 MMOL/L (ref 136–145)
STJ: 3.4 CM
STRESS TARGET HR: 133 BPM
TROPONIN T SERPL-MCNC: <0.01 NG/ML (ref 0–0.03)
TROPONIN T SERPL-MCNC: <0.01 NG/ML (ref 0–0.03)
WBC NRBC COR # BLD: 8.35 10*3/MM3 (ref 3.4–10.8)

## 2022-10-05 PROCEDURE — 93356 MYOCRD STRAIN IMG SPCKL TRCK: CPT | Performed by: INTERNAL MEDICINE

## 2022-10-05 PROCEDURE — 93010 ELECTROCARDIOGRAM REPORT: CPT | Performed by: INTERNAL MEDICINE

## 2022-10-05 PROCEDURE — G0378 HOSPITAL OBSERVATION PER HR: HCPCS

## 2022-10-05 PROCEDURE — C1769 GUIDE WIRE: HCPCS | Performed by: INTERNAL MEDICINE

## 2022-10-05 PROCEDURE — 93017 CV STRESS TEST TRACING ONLY: CPT

## 2022-10-05 PROCEDURE — 93005 ELECTROCARDIOGRAM TRACING: CPT | Performed by: EMERGENCY MEDICINE

## 2022-10-05 PROCEDURE — 25010000002 DIPHENHYDRAMINE PER 50 MG: Performed by: INTERNAL MEDICINE

## 2022-10-05 PROCEDURE — 0 IOPAMIDOL PER 1 ML: Performed by: INTERNAL MEDICINE

## 2022-10-05 PROCEDURE — C1894 INTRO/SHEATH, NON-LASER: HCPCS | Performed by: INTERNAL MEDICINE

## 2022-10-05 PROCEDURE — 25010000002 MIDAZOLAM PER 1 MG: Performed by: INTERNAL MEDICINE

## 2022-10-05 PROCEDURE — 84484 ASSAY OF TROPONIN QUANT: CPT | Performed by: EMERGENCY MEDICINE

## 2022-10-05 PROCEDURE — 83880 ASSAY OF NATRIURETIC PEPTIDE: CPT | Performed by: EMERGENCY MEDICINE

## 2022-10-05 PROCEDURE — 25010000002 MORPHINE PER 10 MG: Performed by: INTERNAL MEDICINE

## 2022-10-05 PROCEDURE — 25010000002 REGADENOSON 0.4 MG/5ML SOLUTION: Performed by: INTERNAL MEDICINE

## 2022-10-05 PROCEDURE — 99219 PR INITIAL OBSERVATION CARE/DAY 50 MINUTES: CPT | Performed by: INTERNAL MEDICINE

## 2022-10-05 PROCEDURE — 25010000002 MORPHINE PER 10 MG: Performed by: EMERGENCY MEDICINE

## 2022-10-05 PROCEDURE — 83690 ASSAY OF LIPASE: CPT | Performed by: EMERGENCY MEDICINE

## 2022-10-05 PROCEDURE — 93306 TTE W/DOPPLER COMPLETE: CPT | Performed by: INTERNAL MEDICINE

## 2022-10-05 PROCEDURE — 85379 FIBRIN DEGRADATION QUANT: CPT | Performed by: INTERNAL MEDICINE

## 2022-10-05 PROCEDURE — 0 TECHNETIUM TETROFOSMIN KIT: Performed by: INTERNAL MEDICINE

## 2022-10-05 PROCEDURE — 25010000002 METHYLPREDNISOLONE PER 125 MG: Performed by: INTERNAL MEDICINE

## 2022-10-05 PROCEDURE — 93356 MYOCRD STRAIN IMG SPCKL TRCK: CPT

## 2022-10-05 PROCEDURE — 71045 X-RAY EXAM CHEST 1 VIEW: CPT

## 2022-10-05 PROCEDURE — 93306 TTE W/DOPPLER COMPLETE: CPT

## 2022-10-05 PROCEDURE — 25010000002 AMINOPHYLLINE PER 250 MG: Performed by: INTERNAL MEDICINE

## 2022-10-05 PROCEDURE — 96374 THER/PROPH/DIAG INJ IV PUSH: CPT

## 2022-10-05 PROCEDURE — A9502 TC99M TETROFOSMIN: HCPCS | Performed by: INTERNAL MEDICINE

## 2022-10-05 PROCEDURE — 80053 COMPREHEN METABOLIC PANEL: CPT | Performed by: EMERGENCY MEDICINE

## 2022-10-05 PROCEDURE — 78451 HT MUSCLE IMAGE SPECT SING: CPT

## 2022-10-05 PROCEDURE — 96376 TX/PRO/DX INJ SAME DRUG ADON: CPT

## 2022-10-05 PROCEDURE — 99285 EMERGENCY DEPT VISIT HI MDM: CPT

## 2022-10-05 PROCEDURE — 85025 COMPLETE CBC W/AUTO DIFF WBC: CPT | Performed by: EMERGENCY MEDICINE

## 2022-10-05 PROCEDURE — 96375 TX/PRO/DX INJ NEW DRUG ADDON: CPT

## 2022-10-05 PROCEDURE — 93458 L HRT ARTERY/VENTRICLE ANGIO: CPT | Performed by: INTERNAL MEDICINE

## 2022-10-05 PROCEDURE — 36415 COLL VENOUS BLD VENIPUNCTURE: CPT

## 2022-10-05 PROCEDURE — 25010000002 FENTANYL CITRATE (PF) 50 MCG/ML SOLUTION: Performed by: EMERGENCY MEDICINE

## 2022-10-05 PROCEDURE — 25010000002 HEPARIN (PORCINE) PER 1000 UNITS: Performed by: INTERNAL MEDICINE

## 2022-10-05 PROCEDURE — 78451 HT MUSCLE IMAGE SPECT SING: CPT | Performed by: INTERNAL MEDICINE

## 2022-10-05 RX ORDER — ONDANSETRON 4 MG/1
4 TABLET, FILM COATED ORAL EVERY 6 HOURS PRN
Status: DISCONTINUED | OUTPATIENT
Start: 2022-10-05 | End: 2022-10-06 | Stop reason: HOSPADM

## 2022-10-05 RX ORDER — MORPHINE SULFATE 2 MG/ML
4 INJECTION, SOLUTION INTRAMUSCULAR; INTRAVENOUS ONCE
Status: COMPLETED | OUTPATIENT
Start: 2022-10-05 | End: 2022-10-05

## 2022-10-05 RX ORDER — FLUTICASONE PROPIONATE 50 MCG
1 SPRAY, SUSPENSION (ML) NASAL 2 TIMES DAILY
Status: DISCONTINUED | OUTPATIENT
Start: 2022-10-05 | End: 2022-10-06 | Stop reason: HOSPADM

## 2022-10-05 RX ORDER — TAMSULOSIN HYDROCHLORIDE 0.4 MG/1
0.4 CAPSULE ORAL DAILY
Status: DISCONTINUED | OUTPATIENT
Start: 2022-10-05 | End: 2022-10-06 | Stop reason: HOSPADM

## 2022-10-05 RX ORDER — LIDOCAINE HYDROCHLORIDE 20 MG/ML
INJECTION, SOLUTION INFILTRATION; PERINEURAL AS NEEDED
Status: DISCONTINUED | OUTPATIENT
Start: 2022-10-05 | End: 2022-10-05 | Stop reason: HOSPADM

## 2022-10-05 RX ORDER — GABAPENTIN 300 MG/1
1200 CAPSULE ORAL EVERY 12 HOURS SCHEDULED
Status: DISCONTINUED | OUTPATIENT
Start: 2022-10-05 | End: 2022-10-06 | Stop reason: HOSPADM

## 2022-10-05 RX ORDER — AMINOPHYLLINE DIHYDRATE 25 MG/ML
250 INJECTION, SOLUTION INTRAVENOUS ONCE
Status: DISCONTINUED | OUTPATIENT
Start: 2022-10-05 | End: 2022-10-05

## 2022-10-05 RX ORDER — SODIUM CHLORIDE 9 MG/ML
100 INJECTION, SOLUTION INTRAVENOUS CONTINUOUS
Status: ACTIVE | OUTPATIENT
Start: 2022-10-05 | End: 2022-10-05

## 2022-10-05 RX ORDER — FENTANYL CITRATE 50 UG/ML
100 INJECTION, SOLUTION INTRAMUSCULAR; INTRAVENOUS ONCE
Status: COMPLETED | OUTPATIENT
Start: 2022-10-05 | End: 2022-10-05

## 2022-10-05 RX ORDER — ESCITALOPRAM OXALATE 20 MG/1
20 TABLET ORAL DAILY
Status: DISCONTINUED | OUTPATIENT
Start: 2022-10-05 | End: 2022-10-06 | Stop reason: HOSPADM

## 2022-10-05 RX ORDER — SODIUM CHLORIDE 0.9 % (FLUSH) 0.9 %
10 SYRINGE (ML) INJECTION AS NEEDED
Status: DISCONTINUED | OUTPATIENT
Start: 2022-10-05 | End: 2022-10-06 | Stop reason: HOSPADM

## 2022-10-05 RX ORDER — NITROGLYCERIN 0.4 MG/1
0.4 TABLET SUBLINGUAL
Status: DISCONTINUED | OUTPATIENT
Start: 2022-10-05 | End: 2022-10-06 | Stop reason: HOSPADM

## 2022-10-05 RX ORDER — SUCRALFATE 1 G/1
1 TABLET ORAL
Status: DISCONTINUED | OUTPATIENT
Start: 2022-10-05 | End: 2022-10-06 | Stop reason: HOSPADM

## 2022-10-05 RX ORDER — LEVOTHYROXINE SODIUM 112 UG/1
112 TABLET ORAL DAILY
Status: DISCONTINUED | OUTPATIENT
Start: 2022-10-06 | End: 2022-10-06 | Stop reason: HOSPADM

## 2022-10-05 RX ORDER — TIZANIDINE 4 MG/1
4 TABLET ORAL EVERY 6 HOURS PRN
Status: DISCONTINUED | OUTPATIENT
Start: 2022-10-05 | End: 2022-10-06 | Stop reason: HOSPADM

## 2022-10-05 RX ORDER — METHYLPREDNISOLONE SODIUM SUCCINATE 125 MG/2ML
125 INJECTION, POWDER, LYOPHILIZED, FOR SOLUTION INTRAMUSCULAR; INTRAVENOUS ONCE
Status: COMPLETED | OUTPATIENT
Start: 2022-10-05 | End: 2022-10-05

## 2022-10-05 RX ORDER — CHOLECALCIFEROL (VITAMIN D3) 125 MCG
5 CAPSULE ORAL NIGHTLY PRN
Status: DISCONTINUED | OUTPATIENT
Start: 2022-10-05 | End: 2022-10-06 | Stop reason: HOSPADM

## 2022-10-05 RX ORDER — NALOXONE HCL 0.4 MG/ML
0.4 VIAL (ML) INJECTION
Status: DISCONTINUED | OUTPATIENT
Start: 2022-10-05 | End: 2022-10-06 | Stop reason: HOSPADM

## 2022-10-05 RX ORDER — AMITRIPTYLINE HYDROCHLORIDE 10 MG/1
10 TABLET, FILM COATED ORAL NIGHTLY
Status: DISCONTINUED | OUTPATIENT
Start: 2022-10-05 | End: 2022-10-06 | Stop reason: HOSPADM

## 2022-10-05 RX ORDER — FAMOTIDINE 20 MG/1
20 TABLET, FILM COATED ORAL 2 TIMES DAILY
Status: DISCONTINUED | OUTPATIENT
Start: 2022-10-05 | End: 2022-10-06 | Stop reason: HOSPADM

## 2022-10-05 RX ORDER — CHOLECALCIFEROL (VITAMIN D3) 125 MCG
10 CAPSULE ORAL NIGHTLY
COMMUNITY

## 2022-10-05 RX ORDER — ONDANSETRON 2 MG/ML
4 INJECTION INTRAMUSCULAR; INTRAVENOUS EVERY 6 HOURS PRN
Status: DISCONTINUED | OUTPATIENT
Start: 2022-10-05 | End: 2022-10-06 | Stop reason: HOSPADM

## 2022-10-05 RX ORDER — DIPHENHYDRAMINE HYDROCHLORIDE 50 MG/ML
50 INJECTION INTRAMUSCULAR; INTRAVENOUS ONCE
Status: COMPLETED | OUTPATIENT
Start: 2022-10-05 | End: 2022-10-05

## 2022-10-05 RX ORDER — AMINOPHYLLINE DIHYDRATE 25 MG/ML
250 INJECTION, SOLUTION INTRAVENOUS ONCE AS NEEDED
Status: COMPLETED | OUTPATIENT
Start: 2022-10-05 | End: 2022-10-05

## 2022-10-05 RX ORDER — SODIUM CHLORIDE 9 MG/ML
INJECTION, SOLUTION INTRAVENOUS CONTINUOUS PRN
Status: COMPLETED | OUTPATIENT
Start: 2022-10-05 | End: 2022-10-05

## 2022-10-05 RX ORDER — MORPHINE SULFATE 2 MG/ML
2 INJECTION, SOLUTION INTRAMUSCULAR; INTRAVENOUS
Status: DISCONTINUED | OUTPATIENT
Start: 2022-10-05 | End: 2022-10-06 | Stop reason: HOSPADM

## 2022-10-05 RX ORDER — MIDAZOLAM HYDROCHLORIDE 1 MG/ML
INJECTION INTRAMUSCULAR; INTRAVENOUS AS NEEDED
Status: DISCONTINUED | OUTPATIENT
Start: 2022-10-05 | End: 2022-10-05 | Stop reason: HOSPADM

## 2022-10-05 RX ORDER — METHADONE HYDROCHLORIDE 5 MG/1
5 TABLET ORAL 2 TIMES DAILY
Status: DISCONTINUED | OUTPATIENT
Start: 2022-10-05 | End: 2022-10-06 | Stop reason: HOSPADM

## 2022-10-05 RX ORDER — UBIDECARENONE 100 MG
100 CAPSULE ORAL DAILY
COMMUNITY

## 2022-10-05 RX ORDER — SODIUM CHLORIDE 9 MG/ML
100 INJECTION, SOLUTION INTRAVENOUS CONTINUOUS
Status: DISCONTINUED | OUTPATIENT
Start: 2022-10-05 | End: 2022-10-05

## 2022-10-05 RX ORDER — FENTANYL CITRATE 50 UG/ML
100 INJECTION, SOLUTION INTRAMUSCULAR; INTRAVENOUS
Status: DISCONTINUED | OUTPATIENT
Start: 2022-10-05 | End: 2022-10-06 | Stop reason: HOSPADM

## 2022-10-05 RX ORDER — ACETAMINOPHEN 325 MG/1
650 TABLET ORAL EVERY 4 HOURS PRN
Status: DISCONTINUED | OUTPATIENT
Start: 2022-10-05 | End: 2022-10-06 | Stop reason: HOSPADM

## 2022-10-05 RX ADMIN — AMITRIPTYLINE HYDROCHLORIDE 10 MG: 10 TABLET, FILM COATED ORAL at 20:35

## 2022-10-05 RX ADMIN — TAMSULOSIN HYDROCHLORIDE 0.4 MG: 0.4 CAPSULE ORAL at 17:26

## 2022-10-05 RX ADMIN — MORPHINE SULFATE 4 MG: 2 INJECTION, SOLUTION INTRAMUSCULAR; INTRAVENOUS at 12:54

## 2022-10-05 RX ADMIN — MORPHINE SULFATE 2 MG: 2 INJECTION, SOLUTION INTRAMUSCULAR; INTRAVENOUS at 17:26

## 2022-10-05 RX ADMIN — REGADENOSON 0.4 MG: 0.08 INJECTION, SOLUTION INTRAVENOUS at 09:20

## 2022-10-05 RX ADMIN — TETROFOSMIN 1 DOSE: 1.38 INJECTION, POWDER, LYOPHILIZED, FOR SOLUTION INTRAVENOUS at 08:24

## 2022-10-05 RX ADMIN — ESCITALOPRAM 20 MG: 20 TABLET, FILM COATED ORAL at 17:26

## 2022-10-05 RX ADMIN — TETROFOSMIN 1 DOSE: 1.38 INJECTION, POWDER, LYOPHILIZED, FOR SOLUTION INTRAVENOUS at 09:20

## 2022-10-05 RX ADMIN — FENTANYL CITRATE 100 MCG: 50 INJECTION INTRAMUSCULAR; INTRAVENOUS at 10:26

## 2022-10-05 RX ADMIN — FENTANYL CITRATE 100 MCG: 50 INJECTION INTRAMUSCULAR; INTRAVENOUS at 11:48

## 2022-10-05 RX ADMIN — AMINOPHYLLINE 250 MG: 25 INJECTION, SOLUTION INTRAVENOUS at 09:26

## 2022-10-05 RX ADMIN — NITROGLYCERIN 1 INCH: 20 OINTMENT TOPICAL at 10:29

## 2022-10-05 RX ADMIN — SUCRALFATE 1 G: 1 TABLET ORAL at 17:27

## 2022-10-05 RX ADMIN — GABAPENTIN 1200 MG: 300 CAPSULE ORAL at 20:35

## 2022-10-05 RX ADMIN — MORPHINE SULFATE 4 MG: 2 INJECTION, SOLUTION INTRAMUSCULAR; INTRAVENOUS at 14:50

## 2022-10-05 RX ADMIN — Medication 5 MG: at 20:59

## 2022-10-05 RX ADMIN — MORPHINE SULFATE 2 MG: 2 INJECTION, SOLUTION INTRAMUSCULAR; INTRAVENOUS at 22:18

## 2022-10-05 RX ADMIN — METHYLPREDNISOLONE SODIUM SUCCINATE 125 MG: 125 INJECTION, POWDER, FOR SOLUTION INTRAMUSCULAR; INTRAVENOUS at 14:16

## 2022-10-05 RX ADMIN — FAMOTIDINE 20 MG: 20 TABLET ORAL at 20:35

## 2022-10-05 RX ADMIN — SUCRALFATE 1 G: 1 TABLET ORAL at 20:35

## 2022-10-05 RX ADMIN — METHADONE HYDROCHLORIDE 5 MG: 5 TABLET ORAL at 20:35

## 2022-10-05 RX ADMIN — FLUTICASONE PROPIONATE 1 SPRAY: 50 SPRAY, METERED NASAL at 20:36

## 2022-10-05 RX ADMIN — DIPHENHYDRAMINE HYDROCHLORIDE 50 MG: 50 INJECTION INTRAMUSCULAR; INTRAVENOUS at 14:15

## 2022-10-05 NOTE — ED PROVIDER NOTES
EMERGENCY DEPARTMENT ENCOUNTER    Room Number:  13/13  Date of encounter:  10/5/2022  PCP: Cesar Hoover MD  Historian: Patient      HPI:  Chief Complaint: Chest pain and confusion  A complete HPI/ROS/PMH/PSH/SH/FH are unobtainable due to: Confusion    Context: Joel Keating is a 62 y.o. male who presents to the ED c/o chest pain and confusion.  Patient was having Lexiscan stress test earlier today.  He developed significant chest pain and was therefore given aminophylline.  Subsequent to this, patient then developed agitation and confusion.  He stated that he wanted to go home.  Given these changes, he was sent to the emergency department.    At this time, the patient is complaining of chest pain that feels like someone is standing on his chest.  Symptoms are constant.  Nothing makes this worse or better.  Nonradiating.      PAST MEDICAL HISTORY  Active Ambulatory Problems     Diagnosis Date Noted   • Atopic rhinitis 07/18/2016   • Anxiety 07/18/2016   • Non-obstructive coronary artery disease 07/18/2016   • Benign prostatic hyperplasia with urinary obstruction 07/18/2016   • Tenderness of lower extremity 07/18/2016   • Degeneration of intervertebral disc of cervical region 07/18/2016   • Depression 07/18/2016   • Degeneration of intervertebral disc of lumbar region 07/18/2016   • Fatigue 07/18/2016   • Fibromyalgia 07/18/2016   • Gastroparesis 07/18/2016   • Herpes simplex virus (HSV) infection 07/18/2016   • Impaired fasting glucose 07/18/2016   • Insomnia 07/18/2016   • Memory loss 07/18/2016   • Onychomycosis of toenail 07/18/2016   • Osteoarthritis of hip 07/18/2016   • Peripheral neuropathy 07/18/2016   • Hypothyroidism 07/18/2016   • Obstructive sleep apnea, adult 07/18/2016   • Annual physical exam 07/18/2016   • Chronic pain syndrome 04/06/2017   • Renal insufficiency 08/14/2017   • Complex sleep apnea syndrome 11/23/2019   • Hypersomnia due to medical condition 11/23/2019   • CSA (central sleep apnea)  09/14/2015   • Health care maintenance 08/17/2020   • Precordial pain 09/04/2020   • Circadian rhythm sleep disorder, delayed sleep phase type 03/22/2021   • Colon cancer screening    • Lesion of breast 11/24/2021     Resolved Ambulatory Problems     Diagnosis Date Noted   • Abdominal pain 07/18/2016   • Angina pectoris (HCC) 07/18/2016   • Gastroesophageal reflux disease 07/18/2016   • Fever and chills 07/18/2016   • Altered mental status 07/18/2016   • Nausea 07/18/2016   • Thrombophlebitis of deep veins of lower extremity (HCC) 07/18/2016   • Tinea versicolor 07/18/2016   • Bronchitis 08/14/2017   • History of deep vein thrombophlebitis of lower extremity 2003   • Acute gastritis    • Abscess of buttock      Past Medical History:   Diagnosis Date   • Allergic rhinitis    • Alopecia    • Arteriosclerotic coronary artery disease    • Benign prostatic hypertrophy (BPH) with nocturia    • Calf tenderness    • Cervical neck pain with evidence of disc disease    • DDD (degenerative disc disease), lumbar    • Degeneration of cervical intervertebral disc    • Esophageal reflux    • Fever 04/2015   • Heart palpitations    • Hypokalemia    • Idiopathic peripheral neuropathy    • Lichen planus    • LOM (loss of memory)    • Mental status change    • GAURANG (obstructive sleep apnea) 05/08/2007   • PN (peripheral neuropathy)    • Primary hypothyroidism    • Rash    • Tendonitis of left hip    • Tendonitis of right hip    • TIA (transient ischemic attack) 06/2016         PAST SURGICAL HISTORY  Past Surgical History:   Procedure Laterality Date   • APPENDECTOMY     • BACK SURGERY      Cervical   • CHOLECYSTECTOMY     • JOINT REPLACEMENT Bilateral     Hip   • LUMBAR FUSION     • SURAL NERVE/MUSCLE BIOPSY  04/2016   • TONSILLECTOMY           FAMILY HISTORY  Family History   Problem Relation Age of Onset   • Heart failure Mother         Congestive   • Heart disease Mother    • Atrial fibrillation Mother    • Alcohol abuse Paternal  Uncle    • Heart failure Paternal Grandmother         Congestive   • No Known Problems Father    • No Known Problems Sister    • No Known Problems Brother    • Heart failure Maternal Grandmother    • Heart disease Maternal Grandmother    • No Known Problems Maternal Grandfather    • No Known Problems Paternal Grandfather          SOCIAL HISTORY  Social History     Socioeconomic History   • Marital status:    • Number of children: 2   Tobacco Use   • Smoking status: Never Smoker   • Smokeless tobacco: Former User     Types: Chew   Vaping Use   • Vaping Use: Never used   Substance and Sexual Activity   • Alcohol use: No   • Drug use: No   • Sexual activity: Defer         ALLERGIES  Butorphanol, Hydroxyzine, Iodinated diagnostic agents, Iodine, Other, Phenytoin, Amoxicillin, and Clindamycin/lincomycin        REVIEW OF SYSTEMS  Review of Systems     All systems reviewed and negative except for those discussed in HPI.       PHYSICAL EXAM    I have reviewed the triage vital signs and nursing notes.    ED Triage Vitals   Temp Heart Rate Resp BP SpO2   10/05/22 1030 10/05/22 0959 10/05/22 0959 10/05/22 0959 10/05/22 0959   97.7 °F (36.5 °C) 95 18 107/91 91 %      Temp src Heart Rate Source Patient Position BP Location FiO2 (%)   10/05/22 1030 -- -- -- --   Oral           Physical Exam  GENERAL: Comfortable, nontoxic  HENT: nares patent  EYES: no scleral icterus  CV: regular rhythm, regular rate  RESPIRATORY: normal effort, clear to auscultation bilaterally  ABDOMEN: soft, nontender  MUSCULOSKELETAL: no deformity, no lower extremity edema or tenderness  NEURO: alert, oriented to hospital, name and year.  Moves all extremities, follows commands without difficulty.  However, he will then suddenly get up and state that he wants to go home.  I can redirect him easily.  Cranial nerves II through XII are grossly intact.  Symmetric smile.  EOMI.  PERRL.  SKIN: warm, dry        LAB RESULTS  Recent Results (from the past 24  hour(s))   Adult Transthoracic Echo Complete w/ Color, Spectral and Contrast if Necessary Per Protocol    Collection Time: 10/05/22  7:55 AM   Result Value Ref Range    Target HR (85%) 133 bpm    Max. Pred. HR (100%) 156 bpm    RV S' 10.8 cm/sec    RV Base 3.5 cm    RV Length 7.9 cm    RV Mid 2.14 cm    Sinus 4.0 cm    STJ 3.4 cm    Ascending aorta 4.0 cm    Aortic arch 2.9 cm    LA ESV Index (BP) 15.4 ml/m2    Avg E/e' ratio 5.12     LV GLOBAL STRAIN  -14.5 %    ACS 2.09 cm    Ao root diam 3.4 cm    Ao pk sanket 101.8 cm/sec    Ao V2 VTI 21.7 cm    PB(I,D) 3.2 cm2    EDV(cubed) 97.6 ml    EDV(MOD-sp2) 83.0 ml    EDV(MOD-sp4) 98.0 ml    EF(MOD-bp) 57.8 %    EF(MOD-sp2) 57.8 %    EF(MOD-sp4) 56.1 %    ESV(cubed) 31.9 ml    ESV(MOD-sp2) 35.0 ml    ESV(MOD-sp4) 43.0 ml    IVS/LVPW 0.83 cm    Lat Peak E' Sanket 7.0 cm/sec    LV mass(C)d 147.4 grams    LV V1 max PG 2.05 mmHg    LV V1 mean PG 1.11 mmHg    LV V1 max 71.6 cm/sec    LVPWd 1.03 cm    Med Peak E' Sanket 6.4 cm/sec    MR max PG 35.3 mmHg    MV dec slope 135.5 cm/sec2    MV dec time 0.36 msec    MV P1/2t 111.5 msec    MV V2 VTI 20.6 cm    MVA(VTI) 3.3 cm2    PA acc time 0.06 sec    PA pr(Accel) 50.5 mmHg    PA V2 max 114.0 cm/sec    Pulm A Revs Sanket 37.9 cm/sec    RAP systole 8.0 mmHg    RV V1 max PG 1.04 mmHg    RV V1 max 51.0 cm/sec    RV V1 VTI 11.3 cm    RVSP(TR) 29.0 mmHg    SI(MOD-sp2) 20.9 ml/m2    SI(MOD-sp4) 24.0 ml/m2    SV(LVOT) 69.0 ml    SV(MOD-sp2) 48.0 ml    SV(MOD-sp4) 55.0 ml    SV(RVOT) 41.5 ml    TR max PG 21.0 mmHg    Ao max PG 4.1 mmHg    Ao mean PG 1.90 mmHg    FS 31.1 %    IVSd 0.86 cm    LV V1 VTI 17.0 cm    LVIDd 4.6 cm    LVIDs 3.2 cm    LVOT area 4.0 cm2    LVOT diam 2.27 cm    MV E/A 0.58     MV max PG 1.49 mmHg    MV mean PG 0.60 mmHg    MVA(P1/2t) 1.97 cm2    Pulm S/D 0.99     Qp/Qs 0.60     RVOT diam 2.16 cm    MR max sanket 297.3 cm/sec    MV A dur 0.15 sec    MV A max sanket 59.1 cm/sec    MV E max sanket 34.3 cm/sec    Pulm A Revs Dur 0.15 sec     Pulm Delaney Sanket 36.2 cm/sec    Pulm Sys Sanket 35.8 cm/sec    TR max sanket 229.4 cm/sec    LV Delaney Vol (BSA corrected) 42.7 cm2    LV Sys Vol (BSA corrected) 18.8 cm2    TAPSE (>1.6) 2.03 cm   ECG 12 Lead    Collection Time: 10/05/22 10:05 AM   Result Value Ref Range    QT Interval 349 ms   Stress Test With Myocardial Perfusion One Day    Collection Time: 10/05/22 10:20 AM   Result Value Ref Range    Target HR (85%) 133 bpm    Max. Pred. HR (100%) 156 bpm    BH CV REST NUCLEAR ISOTOPE DOSE 10.7 mCi    Nuclear Prior Study 1     BH CV STRESS NUCLEAR ISOTOPE DOSE 35.6 mCi     CV STRESS PROTOCOL 1 Pharmacologic     Stage 1 1     HR Stage 1 88     BP Stage 1 110/72     Duration Min Stage 1 0     Duration Sec Stage 1 10     Stress Dose Regadenoson Stage 1 0.4     Stress Comments Stage 1 10 sec bolus injection     Baseline HR 70 bpm    Baseline /80 mmHg    Peak HR 88 bpm    Percent Max Pred HR 56.41 %    Percent Target HR 66 %    Peak /72 mmHg    Recovery HR 80 bpm    Recovery /82 mmHg    Exercise duration (sec) 10 sec   Comprehensive Metabolic Panel    Collection Time: 10/05/22 10:25 AM    Specimen: Blood   Result Value Ref Range    Glucose 101 (H) 65 - 99 mg/dL    BUN 18 8 - 23 mg/dL    Creatinine 1.35 (H) 0.76 - 1.27 mg/dL    Sodium 140 136 - 145 mmol/L    Potassium 3.9 3.5 - 5.2 mmol/L    Chloride 100 98 - 107 mmol/L    CO2 25.2 22.0 - 29.0 mmol/L    Calcium 10.5 8.6 - 10.5 mg/dL    Total Protein 7.8 6.0 - 8.5 g/dL    Albumin 5.30 (H) 3.50 - 5.20 g/dL    ALT (SGPT) 21 1 - 41 U/L    AST (SGOT) 17 1 - 40 U/L    Alkaline Phosphatase 111 39 - 117 U/L    Total Bilirubin 0.6 0.0 - 1.2 mg/dL    Globulin 2.5 gm/dL    A/G Ratio 2.1 g/dL    BUN/Creatinine Ratio 13.3 7.0 - 25.0    Anion Gap 14.8 5.0 - 15.0 mmol/L    eGFR 58.6 (L) >60.0 mL/min/1.73   Lipase    Collection Time: 10/05/22 10:25 AM    Specimen: Blood   Result Value Ref Range    Lipase 29 13 - 60 U/L   BNP    Collection Time: 10/05/22 10:25 AM    Specimen:  Blood   Result Value Ref Range    proBNP 42.8 0.0 - 900.0 pg/mL   Troponin    Collection Time: 10/05/22 10:25 AM    Specimen: Blood   Result Value Ref Range    Troponin T <0.010 0.000 - 0.030 ng/mL   CBC Auto Differential    Collection Time: 10/05/22 10:25 AM    Specimen: Blood   Result Value Ref Range    WBC 8.35 3.40 - 10.80 10*3/mm3    RBC 4.93 4.14 - 5.80 10*6/mm3    Hemoglobin 15.9 13.0 - 17.7 g/dL    Hematocrit 46.3 37.5 - 51.0 %    MCV 93.9 79.0 - 97.0 fL    MCH 32.3 26.6 - 33.0 pg    MCHC 34.3 31.5 - 35.7 g/dL    RDW 13.2 12.3 - 15.4 %    RDW-SD 45.9 37.0 - 54.0 fl    MPV 8.6 6.0 - 12.0 fL    Platelets 249 140 - 450 10*3/mm3    Neutrophil % 47.9 42.7 - 76.0 %    Lymphocyte % 42.9 19.6 - 45.3 %    Monocyte % 6.6 5.0 - 12.0 %    Eosinophil % 1.8 0.3 - 6.2 %    Basophil % 0.4 0.0 - 1.5 %    Immature Grans % 0.4 0.0 - 0.5 %    Neutrophils, Absolute 4.01 1.70 - 7.00 10*3/mm3    Lymphocytes, Absolute 3.58 (H) 0.70 - 3.10 10*3/mm3    Monocytes, Absolute 0.55 0.10 - 0.90 10*3/mm3    Eosinophils, Absolute 0.15 0.00 - 0.40 10*3/mm3    Basophils, Absolute 0.03 0.00 - 0.20 10*3/mm3    Immature Grans, Absolute 0.03 0.00 - 0.05 10*3/mm3    nRBC 0.0 0.0 - 0.2 /100 WBC   Troponin    Collection Time: 10/05/22  1:06 PM    Specimen: Blood   Result Value Ref Range    Troponin T <0.010 0.000 - 0.030 ng/mL       Ordered the above labs and independently reviewed the results.        RADIOLOGY  Adult Transthoracic Echo Complete w/ Color, Spectral and Contrast if Necessary Per Protocol    Result Date: 10/5/2022  · Calculated left ventricular EF = 57.8% Estimated left ventricular EF was in agreement with the calculated left ventricular EF. Left ventricular systolic function is normal. · Left ventricular diastolic function was normal. · Mild dilatation of aortic root and proximal ascending aorta measuring about 4.0 cm.      XR Chest 1 View    Result Date: 10/5/2022  Portable chest radiograph  HISTORY:Chest pain following Lexiscan   TECHNIQUE: Single AP portable radiograph of the chest  COMPARISON:Chest radiograph 09/26/2019 and chest CT 12/03/2020      FINDINGS AND IMPRESSION: Cervical fusion hardware is not well evaluated. There is asymmetric left basilar pulmonary opacification which was not seen on prior imaging most suggestive atelectasis versus pneumonia in the appropriate clinical context and correlation with patient history is recommended. No pneumothorax or pleural effusion is seen. Cardiac silhouette is within normal limits for size.  This report was finalized on 10/5/2022 11:10 AM by Dr. Radhames Pedraza M.D.        I ordered the above noted radiological studies. Reviewed by me and discussed with radiologist.  See dictation for official radiology interpretation.      PROCEDURES    Procedures      MEDICATIONS GIVEN IN ER    Medications   sodium chloride 0.9 % flush 10 mL (has no administration in time range)   fentaNYL citrate (PF) (SUBLIMAZE) injection 100 mcg (100 mcg Intravenous Given 10/5/22 1148)   nitroglycerin (NITROSTAT) SL tablet 0.4 mg (has no administration in time range)   methylPREDNISolone sodium succinate (SOLU-Medrol) injection 125 mg (has no administration in time range)   diphenhydrAMINE (BENADRYL) injection 50 mg (has no administration in time range)   sodium chloride 0.9 % infusion (has no administration in time range)   fentaNYL citrate (PF) (SUBLIMAZE) injection 100 mcg (100 mcg Intravenous Given 10/5/22 1026)   nitroglycerin (NITROSTAT) ointment 1 inch (1 inch Topical Given 10/5/22 1029)   morphine injection 4 mg (4 mg Intravenous Given 10/5/22 1254)         PROGRESS, DATA ANALYSIS, CONSULTS, AND MEDICAL DECISION MAKING    All labs have been independently reviewed by me.  All radiology studies have been reviewed by me and discussed with radiologist dictating the report.   EKG's independently viewed and interpreted by me.  Discussion below represents my analysis of pertinent findings related to patient's condition,  differential diagnosis, treatment plan and final disposition.    Differential diagnosis includes but not limited to acute coronary syndrome, pulmonary embolism, thoracic aortic dissection, pneumonia, pneumothorax, musculoskeletal pain, GERD or esophageal spasm, anxiety, myocarditis/pericarditis, esophageal rupture, pancreatitis.     History: 1  EC  Age: 1  Risk factors: 2    HEART score: 4        ED Course as of 10/05/22 1405   Wed Oct 05, 2022   0958 On medical chart review, patient seen by cardiology 2022.  He was seen for dyspnea with chest pressure.  Symptoms felt to be atypical but in light of his history of coronary artery disease, plan for stress test.  Patient got a stress test today and reportedly became very confused after the stress test.  He received aminophylline. [TD]   1010 EKG interpreted by myself.  Time 10:05 AM.  Sinus rhythm.  Heart rate 99.  Left axis deviation.  No acute ST abnormality. [TD]   1128 WBC: 8.35 [TD]   1129 I discussed the case with Dr. Johnson, cardiology.  She obtain additional information from about the patient's events prior to his ER transfer.  He had his Lexiscan done with significant chest pain.  He was then given aminophylline.  After the aminophylline, he developed restlessness and confusion. [TD]   1139 Troponin T: <0.010 [TD]   1231 Creatinine(!): 1.35 [TD]      ED Course User Index  [TD] Brady Cook II, MD       Patient presented initially for chest pain and confusion.  Chest pain seems to be the patient's primary complaint.  He is showing some signs of mild confusion where he wants to go home despite the severity of the chest pain that he is describing.  Throughout his ED course, his mental status has improved tremendously.  He is now back to his normal self and does not have episodes of requesting to be discharged.  It appears that agitation is a known side effect of aminophylline and I believe this is the likely cause of his symptoms given his  nonfocal neurological examination.  However, he is continue to demonstrate chest pressure.  Therefore, cardiology came down to see the patient and his agreed to admit him.    PPE: The patient wore a surgical mask throughout the entire patient encounter. I wore an N95.    AS OF 14:05 EDT VITALS:    BP - 108/70  HR - 68  TEMP - 97.7 °F (36.5 °C) (Oral)  O2 SATS - 96%        DIAGNOSIS  Final diagnoses:   Chest pressure   Agitation after administration of aminophylline      DISPOSITION  Admit           Brady Cook II, MD  10/05/22 5736

## 2022-10-05 NOTE — H&P
[facility] Consult Note    Patient Name: Joel Keating  :1960  62 y.o.    Date of Admission: 10/5/2022  Date of Consultation:  10/05/22  Encounter Provider: Shanta Johnson MD  Place of Service: [facility] CARDIOLOGY  Referring Provider: Sahnta Johnsno MD  Patient Care Team:  Cesar Hoover MD as PCP - General (Family Medicine)  Cesar Hoover MD as PCP - Internal Medicine      Chief complaint: Chest pain     History of Present Illness:    Mr Keating is a 62 year old patient with fibromyalgia, hypothyroidism, mild non obstructive CAD, anxiety, presented to the emergency room with chest discomfort.    Saw the patient for urgent follow-up on 2022 with complaints of increasing fatigue and chest pressure occurring both with rest and exertion.  He also reported symptoms of palpitations and lightheadedness with position changes.  Recommended further work-up with an echocardiogram and a stress test.  He underwent an echocardiogram today that was unremarkable except for mildly dilated aorta.  During the stress portion of his stress test he received Lexiscan infusion and began developing chest pressure radiating to his left arm and jaw.  He was given aminophylline to reverse his symptoms but his symptoms continued and he actually developed a brief syncopal episode per the staff.  When he came to he appeared to be confused was not oriented to place.  His symptoms did not improve with observation so he was sent to the emergency room for further evaluation.    Following his arrival to the emergency room his work-up has been unremarkable including unremarkable troponins x2 and an unchanged EKG.  His confusion has cleared up.  He continues to have chest pressure that he describes as something sitting on his chest with radiation to his left arm and jaw.  This is despite administration of nitroglycerin, IV fentanyl, and IV morphine.  He reports that his pain only improved from a  7/10 to a 6/10.  He also reports some shortness of breath.    In addition to the above symptoms the patient also reports that he has been having syncopal episodes at home preceded by lightheadedness.  These episodes appear to be brief and occur frequently.          Previous Cardiac Testing  ECHO  10/5/2022  · Calculated left ventricular EF = 57.8% Estimated left ventricular EF was in agreement with the calculated left ventricular EF. Left ventricular systolic function is normal.  · Left ventricular diastolic function was normal.  · Mild dilatation of aortic root and proximal ascending aorta measuring about 4.0 cm.     Stress Test 9/16/2020  · Myocardial perfusion imaging indicates a normal myocardial perfusion study with no evidence of ischemia.  · Left ventricular ejection fraction is normal. EF=54%  · Impressions are consistent with a low risk study.    Cardiac Catheterization 12/10/2014  FINDINGS:  1.  Hemodynamics: LV 95/4, AO 95/42/70.   2.  Left ventriculography: EF 60%. No mitral regurgitation.   3.  Coronary angiography: Right dominant system, mild coronary disease. The  left main is normal. The LAD has a 30% proximal stenosis with some myocardial  bridging. The circumflex has 30% stenosis of the AV groove circumflex. The  ramus branch has 30% stenosis. The RCA has 30% to 40% proximal stenosis.      SUMMARY: Normal left ventricular systolic function with mild diffuse coronary  disease and a mild proximal LAD myocardial bridge.      RECOMMENDATIONS: Medical management.     Holter Monitor 10/25/2021  Study Findings    Patient diary submitted. There were 15 triggered episodes with no recorded symptoms was reported during the monitoring period. There were 15 triggered episodes with no recorded symptoms had no correlations. The diary states that frequent episodes of There were 15 triggered episodes with no recorded symptoms occurred. No complications noted. The predominant rhythm noted during the testing period  was sinus rhythm. Premature atrial contractions occured rarely. There was a 4 beat episode of nonsustained supraventricular tachycardia 138 bpm. Premature ventricular contractions occured rarely. Ventricular couplets. There were no episodes of ventricular tachycardia. Sinoatrial node conduction was normal. No atrioventricular block noted.       Nuclear Perfusion Findings    Study Impression A relatively benign monitor study.           Past Medical History:   Diagnosis Date   • Abdominal pain     Recurrent sx and follows with Dr. Heard.   • Abscess of buttock    • Acute gastritis    • Allergic rhinitis    • Alopecia    • Angina pectoris (HCC)    • Anxiety    • Arteriosclerotic coronary artery disease    • Benign prostatic hypertrophy (BPH) with nocturia    • Calf tenderness    • Cervical neck pain with evidence of disc disease    • CSA (central sleep apnea) 09/14/2015    Overnight polysomnogram.  Weight 205 pounds.  AHI severely abnormal at 58 events per hour.  Central sleep apnea index 34 events per hour.  Obstructive sleep apnea index 24 events per hour.  No sleep-related hypoxia.  Auto SV titration performed 11/2/2015.   • DDD (degenerative disc disease), lumbar    • Degeneration of cervical intervertebral disc    • Depression    • Esophageal reflux     Follows with Dr. Jackson   • Fatigue    • Fever 04/2015    Low grade symptoms of fever for 3 weeks.   • Fibromyalgia    • Gastroparesis    • Health care maintenance    • Heart palpitations    • History of deep vein thrombophlebitis of lower extremity 2003    DVT Dx 2003   • Hypokalemia    • Hypothyroidism    • Idiopathic peripheral neuropathy    • Impaired fasting glucose    • Insomnia    • Lichen planus     tongue   • LOM (loss of memory)    • Mental status change     Episodes where he stares ahead and have some shaking of his limbs.  Possible seizure activity.  Referred to Dr. Santana, October 2, 2013.   • Nausea    • Onychomycosis of toenail    • GAURANG  (obstructive sleep apnea) 05/08/2007    Overnight polysomnogram.  Weight 247 pounds.  AHI mildly abnormal at 11 events per hour without sleep-related hypoxia noted.   • Osteoarthritis of hip    • PN (peripheral neuropathy)    • Primary hypothyroidism    • Rash    • Tendonitis of left hip    • Tendonitis of right hip    • TIA (transient ischemic attack) 06/2016   • Tinea versicolor        Past Surgical History:   Procedure Laterality Date   • APPENDECTOMY     • BACK SURGERY      Cervical   • CHOLECYSTECTOMY     • JOINT REPLACEMENT Bilateral     Hip   • LUMBAR FUSION     • SURAL NERVE/MUSCLE BIOPSY  04/2016   • TONSILLECTOMY           Prior to Admission medications    Medication Sig Start Date End Date Taking? Authorizing Provider   ALLERGY SERUM SUBLINGUAL DROPS Place  under the tongue Daily.    Tin Lala MD   amitriptyline (ELAVIL) 10 MG tablet Take 10 mg by mouth Every Night. 11/19/20   Emergency, Nurse Mary, RN   EPINEPHrine (EPIPEN) 0.3 MG/0.3ML solution auto-injector injection  10/27/21   Tin Lala MD   escitalopram (LEXAPRO) 20 MG tablet Take 1 tablet by mouth daily. 1/12/15   Tin Lala MD   famotidine (PEPCID) 20 MG tablet Take 1 tablet by mouth 2 (Two) Times a Day.    Tin Lala MD   fluticasone (FLONASE) 50 MCG/ACT nasal spray 1 spray into each nostril 2 (two) times a day. Administer 2 sprays in each nostril for each dose. 7/18/16   Cesar Hoover MD   gabapentin (NEURONTIN) 600 MG tablet TAKE 2 TABLETS BY MOUTH  TWICE A DAY. MAXIMUM DAILY  AMOUNT: 2400MG 10/13/19   Tin Lala MD   methadone (DOLOPHINE) 5 MG tablet Take 5 mg by mouth 2 (Two) Times a Day.    Tin Lala MD   Multiple Vitamins-Minerals (MULTI COMPLETE PO) Take  by mouth.    Tin Lala MD   Narcan 4 MG/0.1ML nasal spray  5/18/21   Tin Lala MD   ondansetron (ZOFRAN) 4 MG tablet Take 4 mg by mouth Every 6 (Six) Hours As Needed for Nausea or Vomiting.  Take for nausea and vomiting.    Tin Lala MD   Sennosides (SENNA) 8.6 MG capsule Take 1 capsule by mouth Daily. 1/12/15   Tin Lala MD   sucralfate (CARAFATE) 1 GM/10ML suspension Take 10 mL by mouth 4 (four) times a day as needed. 9/10/15   Tin Lala MD   Synthroid 112 MCG tablet TAKE ONE TABLET BY MOUTH DAILY 6/21/22   Janelle Serna MD   tamsulosin (FLOMAX) 0.4 MG capsule 24 hr capsule Take 1 capsule by mouth daily.    Tin Lala MD   tiZANidine (ZANAFLEX) 4 MG tablet Take 4 mg by mouth Every 6 (Six) Hours As Needed. 11/19/20   Emergency, Nurse Mary, RN   Unable to find Domperidone POWD; 1 tablet twice daily. 1/12/15   Tin Lala MD       Allergies   Allergen Reactions   • Butorphanol    • Hydroxyzine    • Iodinated Diagnostic Agents Hives   • Iodine    • Other      Vistra 650 Tabs   • Phenytoin    • Amoxicillin Rash   • Clindamycin/Lincomycin Rash       Social History     Socioeconomic History   • Marital status:    • Number of children: 2   Tobacco Use   • Smoking status: Never Smoker   • Smokeless tobacco: Former User     Types: Chew   Vaping Use   • Vaping Use: Never used   Substance and Sexual Activity   • Alcohol use: No   • Drug use: No   • Sexual activity: Defer       Family History   Problem Relation Age of Onset   • Heart failure Mother         Congestive   • Heart disease Mother    • Atrial fibrillation Mother    • Alcohol abuse Paternal Uncle    • Heart failure Paternal Grandmother         Congestive   • No Known Problems Father    • No Known Problems Sister    • No Known Problems Brother    • Heart failure Maternal Grandmother    • Heart disease Maternal Grandmother    • No Known Problems Maternal Grandfather    • No Known Problems Paternal Grandfather        REVIEW OF SYSTEMS:   All systems reviewed.  Pertinent positives identified in HPI.  All other systems are negative.      Objective:     Vitals:    10/05/22 1230 10/05/22 1231  10/05/22 1301 10/05/22 1302   BP:  108/78 108/70    Pulse: 69   68   Resp:       Temp:       TempSrc:       SpO2: 95%   96%   Weight:       Height:         Body mass index is 29.13 kg/m².    General Appearance:    Alert, cooperative, in no acute distress   Head:    Normocephalic, without obvious abnormality, atraumatic   Eyes:            Lids and lashes normal, conjunctivae and sclerae normal, no icterus, no pallor, corneas clear, PERRLA   Ears:    Ears appear intact with no abnormalities noted   Neck:   No adenopathy, supple, trachea midline, no thyromegaly, no carotid bruit, no JVD   Lungs:     Clear to auscultation, respirations regular, even and unlabored    Heart:    Regular rhythm and normal rate, normal S1 and S2, no murmur, no gallop, no rub, no click   Chest Wall:    No abnormalities observed   Abdomen:     Normal bowel sounds, no masses, no organomegaly, soft, nontender, nondistended, no guarding, no rebound  tenderness   Extremities:   Moves all extremities well, no edema, no cyanosis, no redness   Pulses:   Pulses palpable and equal bilaterally. Normal radial, carotid, femoral, dorsalis pedis and posterior tibial pulses bilaterally. Normal abdominal aorta   Skin:  Psychiatric:   No bleeding, bruising or rash    Alert and oriented x 3, normal mood and affect   Lab Review:     Results from last 7 days   Lab Units 10/05/22  1025   SODIUM mmol/L 140   POTASSIUM mmol/L 3.9   CHLORIDE mmol/L 100   CO2 mmol/L 25.2   BUN mg/dL 18   CREATININE mg/dL 1.35*   CALCIUM mg/dL 10.5   BILIRUBIN mg/dL 0.6   ALK PHOS U/L 111   ALT (SGPT) U/L 21   AST (SGOT) U/L 17   GLUCOSE mg/dL 101*     Results from last 7 days   Lab Units 10/05/22  1306 10/05/22  1025   TROPONIN T ng/mL <0.010 <0.010     Results from last 7 days   Lab Units 10/05/22  1025   WBC 10*3/mm3 8.35   HEMOGLOBIN g/dL 15.9   HEMATOCRIT % 46.3   PLATELETS 10*3/mm3 249                         CXR  FINDINGS AND IMPRESSION:  Cervical fusion hardware is not well  evaluated. There is asymmetric left  basilar pulmonary opacification which was not seen on prior imaging most  suggestive atelectasis versus pneumonia in the appropriate clinical  context and correlation with patient history is recommended. No  pneumothorax or pleural effusion is seen. Cardiac silhouette is within  normal limits for size.    EKG this admission       EKG baseline       I personally viewed and interpreted the patient's EKG/Telemetry data.        Assessment and Plan:       1.  Chest pain.  Symptoms are concerning for unstable angina.  However his troponins and EKG are both unremarkable.  This all started after he received Lexiscan infusion for a stress test.  He has received Lexiscan and in the past without any significant issues.  2.  Confusion.  Appears to have resolved.  3.  History of mild nonobstructive coronary artery disease  4.  Recurrent syncope.  He has had prior syncopal episodes and work-ups in the past have been unremarkable including a ZIO monitor in 10/2022.  5.  Hypothyroidism  6.  Fibromyalgia  7.  Anxiety    - Due to his ongoing chest discomfort I think we need to definitively rule out ischemic heart disease with coronary angiograms.  Discussed with the patient and his wife and he agrees to proceed.  We will pretreat him for his iodine allergy.  - Check a D-dimer to ensure no other cause for his symptoms.  - We will keep him overnight and observe him to ensure no further issues.  - The patient has had multiple monitors in the past for reported episodes of syncope.  I have a low suspicion that his syncopal episodes are cardiac.  I think it is more likely either due to orthostatic hypotension or vasovagal.    Shanta Johnson MD  10/05/22  14:30 EDT

## 2022-10-05 NOTE — PLAN OF CARE
Goal Outcome Evaluation:  Plan of Care Reviewed With: patient        Progress: no change  Outcome Evaluation: Complaints of chest pressure radiating to left arm, neck and back. Morphine given IV. Right radial site with dressing dry/intact, no hematoma or bleeding noted. Home medications for pain restarting. Monitor right radial site and pain.

## 2022-10-05 NOTE — ED NOTES
Pt is alert to middle name and that he is currently in a hospital. Pt answers middle name when asked for date of birth and city. Pt states he is at [facility]. Pt complains of chest pain. Pt repeatedly saying he needs to leave and asking why he is here. This RN explained the situation and redirected pt.

## 2022-10-05 NOTE — ED TRIAGE NOTES
Pt to ED from [facility] after stress test. Pt was given lexiscan and became confused and began complaining of chest pain. Pt was given aminophylline prior to arrival. [facility] RN reported pt was initially alert and oriented x4. Upon arrival pt is confused and not following commands appropriately.     Patient was wearing a face mask throughout our encounter.  This RN wore appropriate PPE throughout the encounter.  Hand hygiene was performed before and after patient encounter.

## 2022-10-06 ENCOUNTER — READMISSION MANAGEMENT (OUTPATIENT)
Dept: CALL CENTER | Facility: HOSPITAL | Age: 62
End: 2022-10-06

## 2022-10-06 VITALS
BODY MASS INDEX: 29.13 KG/M2 | HEIGHT: 74 IN | WEIGHT: 227 LBS | DIASTOLIC BLOOD PRESSURE: 81 MMHG | SYSTOLIC BLOOD PRESSURE: 111 MMHG | OXYGEN SATURATION: 91 % | TEMPERATURE: 98.5 F | HEART RATE: 74 BPM | RESPIRATION RATE: 16 BRPM

## 2022-10-06 LAB
ANION GAP SERPL CALCULATED.3IONS-SCNC: 8 MMOL/L (ref 5–15)
BH CV NUCLEAR PRIOR STUDY: 1
BH CV REST NUCLEAR ISOTOPE DOSE: 10.7 MCI
BH CV STRESS BP STAGE 1: NORMAL
BH CV STRESS COMMENTS STAGE 1: NORMAL
BH CV STRESS DOSE REGADENOSON STAGE 1: 0.4
BH CV STRESS DURATION MIN STAGE 1: 0
BH CV STRESS DURATION SEC STAGE 1: 10
BH CV STRESS HR STAGE 1: 88
BH CV STRESS PROTOCOL 1: NORMAL
BH CV STRESS RECOVERY BP: NORMAL MMHG
BH CV STRESS RECOVERY HR: 80 BPM
BH CV STRESS STAGE 1: 1
BUN SERPL-MCNC: 15 MG/DL (ref 8–23)
BUN/CREAT SERPL: 12.9 (ref 7–25)
CALCIUM SPEC-SCNC: 9 MG/DL (ref 8.6–10.5)
CHLORIDE SERPL-SCNC: 101 MMOL/L (ref 98–107)
CO2 SERPL-SCNC: 25 MMOL/L (ref 22–29)
CREAT SERPL-MCNC: 1.16 MG/DL (ref 0.76–1.27)
EGFRCR SERPLBLD CKD-EPI 2021: 70.3 ML/MIN/1.73
GLUCOSE SERPL-MCNC: 206 MG/DL (ref 65–99)
MAXIMAL PREDICTED HEART RATE: 156 BPM
PERCENT MAX PREDICTED HR: 56.41 %
POTASSIUM SERPL-SCNC: 4.3 MMOL/L (ref 3.5–5.2)
SODIUM SERPL-SCNC: 134 MMOL/L (ref 136–145)
STRESS BASELINE BP: NORMAL MMHG
STRESS BASELINE HR: 70 BPM
STRESS PERCENT HR: 66 %
STRESS POST EXERCISE DUR SEC: 10 SEC
STRESS POST PEAK BP: NORMAL MMHG
STRESS POST PEAK HR: 88 BPM
STRESS TARGET HR: 133 BPM

## 2022-10-06 PROCEDURE — G0378 HOSPITAL OBSERVATION PER HR: HCPCS

## 2022-10-06 PROCEDURE — 99217 PR OBSERVATION CARE DISCHARGE MANAGEMENT: CPT | Performed by: NURSE PRACTITIONER

## 2022-10-06 PROCEDURE — 80048 BASIC METABOLIC PNL TOTAL CA: CPT | Performed by: INTERNAL MEDICINE

## 2022-10-06 PROCEDURE — 25010000002 MORPHINE PER 10 MG: Performed by: INTERNAL MEDICINE

## 2022-10-06 RX ORDER — ISOSORBIDE MONONITRATE 30 MG/1
30 TABLET, EXTENDED RELEASE ORAL
Qty: 30 TABLET | Refills: 5 | Status: SHIPPED | OUTPATIENT
Start: 2022-10-06 | End: 2022-10-19

## 2022-10-06 RX ORDER — ISOSORBIDE MONONITRATE 30 MG/1
30 TABLET, EXTENDED RELEASE ORAL
Status: DISCONTINUED | OUTPATIENT
Start: 2022-10-06 | End: 2022-10-06 | Stop reason: HOSPADM

## 2022-10-06 RX ORDER — ASPIRIN 81 MG/1
81 TABLET ORAL DAILY
Status: DISCONTINUED | OUTPATIENT
Start: 2022-10-06 | End: 2022-10-06

## 2022-10-06 RX ADMIN — MORPHINE SULFATE 2 MG: 2 INJECTION, SOLUTION INTRAMUSCULAR; INTRAVENOUS at 07:27

## 2022-10-06 RX ADMIN — METHADONE HYDROCHLORIDE 5 MG: 5 TABLET ORAL at 09:25

## 2022-10-06 RX ADMIN — MORPHINE SULFATE 2 MG: 2 INJECTION, SOLUTION INTRAMUSCULAR; INTRAVENOUS at 09:31

## 2022-10-06 RX ADMIN — ESCITALOPRAM 20 MG: 20 TABLET, FILM COATED ORAL at 09:25

## 2022-10-06 RX ADMIN — TAMSULOSIN HYDROCHLORIDE 0.4 MG: 0.4 CAPSULE ORAL at 09:25

## 2022-10-06 RX ADMIN — MORPHINE SULFATE 2 MG: 2 INJECTION, SOLUTION INTRAMUSCULAR; INTRAVENOUS at 11:58

## 2022-10-06 RX ADMIN — FLUTICASONE PROPIONATE 1 SPRAY: 50 SPRAY, METERED NASAL at 09:25

## 2022-10-06 RX ADMIN — GABAPENTIN 1200 MG: 300 CAPSULE ORAL at 09:24

## 2022-10-06 RX ADMIN — MORPHINE SULFATE 2 MG: 2 INJECTION, SOLUTION INTRAMUSCULAR; INTRAVENOUS at 03:40

## 2022-10-06 RX ADMIN — ISOSORBIDE MONONITRATE 30 MG: 30 TABLET, EXTENDED RELEASE ORAL at 10:58

## 2022-10-06 RX ADMIN — LEVOTHYROXINE SODIUM 112 MCG: 0.11 TABLET ORAL at 09:25

## 2022-10-06 RX ADMIN — FAMOTIDINE 20 MG: 20 TABLET ORAL at 09:25

## 2022-10-06 RX ADMIN — SUCRALFATE 1 G: 1 TABLET ORAL at 06:38

## 2022-10-06 NOTE — PROGRESS NOTES
[facility] Clinical Pharmacy Services: Patient Counseling Consult    Joel Keating has been counseled on the following medication: Isosorbide mononitrate. Counseling points included the following:    Explained indication of medication, and patient's need for this medication.  Went over dosing and frequency of medication.  Discussed any special administration, storage, or monitoring instructions with medication.  Discussed all important drug interactions, including over-the-counter medications and supplements.  Explained possible side effects for medication.  Instructed the patient not to begin or discontinue any medications without informing his/her physician/pharmacist.    Patient expressed understanding and had no further questions.      Shelley Shaffer, Pharm.D., [facility]   Clinical Pharmacist  Phone Extension #[phone]

## 2022-10-06 NOTE — DISCHARGE SUMMARY
Patient Name: Joel Keating  :1960  62 y.o.    Date of Admit: 10/5/2022  Date of Discharge:  10/6/2022    Discharge Diagnosis: Non-obstructive CAD  Problems Addressed this Visit        Symptoms and Signs    Chest pressure - Primary      Diagnoses       Codes Comments    Chest pressure    -  Primary ICD-10-CM: R07.89  ICD-9-CM: 786.59           Hospital Course:   This is a 61 y/o man who follows with Dr. Johnson. He was seen in urgent follow-up on  with complaints of chest pain and fatigue. He underwent an echocardiogram that was unremarkable. A Lexiscan stress test was performed on 10/5 and he developed chest pressure radiating to his arm and jaw. He then had a brief syncopal episode. He was sent to the ED for further evaluation. Work-up was unremarkable in the ED with two normal troponins and an unchanged EKG. 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 His right wrist is soft with no palpable hematoma. He denies any shortness of breath, dizziness or syncope since his procedure. He has concerns of what is causing his chest discomfort and fatigue. I am reluctant to start him on a beta blocker for his non-obstructive CAD due to the amount of fatigue he is already experiencing. I feel this will only exacerbate it. I will  I am going to start him on a low dose isosorbide and see if this improves his symptoms. I discussed with him that he should look into other causes of his discomfort and syncope. The discomfort may be related to his fibromyalgia. He has seen a rheumatologist in the past,  about 4 years ago. I recommended that he follow up with this physician again. I am going to have him follow up with me in the office in the next 1-2 weeks.     Procedures Performed  Procedure(s):  Left Heart Cath  Coronary angiography       Consults     Date and Time Order Name Status Description    10/5/2022 10:04 AM KANDICE (on-call MD unless specified)            Pertinent Test Results:   Results from last 7 days   Lab Units 10/06/22  0301 10/05/22  1025   SODIUM mmol/L 134* 140   POTASSIUM mmol/L 4.3 3.9   CHLORIDE mmol/L 101 100   CO2 mmol/L 25.0 25.2   BUN mg/dL 15 18   CREATININE mg/dL 1.16 1.35*   CALCIUM mg/dL 9.0 10.5   BILIRUBIN mg/dL  --  0.6   ALK PHOS U/L  --  111   ALT (SGPT) U/L  --  21   AST (SGOT) U/L  --  17   GLUCOSE mg/dL 206* 101*     Results from last 7 days   Lab Units 10/05/22  1306 10/05/22  1025   TROPONIN T ng/mL <0.010 <0.010     @LABRCNT(bnp)@  Results from last 7 days   Lab Units 10/05/22  1025   WBC 10*3/mm3 8.35   HEMOGLOBIN g/dL 15.9   HEMATOCRIT % 46.3   PLATELETS 10*3/mm3 249                   Condition on Discharge: stable    Discharge Medications     Discharge Medications      New Medications      Instructions Start Date   isosorbide mononitrate 30 MG 24 hr tablet  Commonly known as: IMDUR   30 mg, Oral, Every 24 Hours Scheduled         Changes to Medications      Instructions Start Date   fluticasone 50 MCG/ACT nasal spray  Commonly known as: FLONASE  What changed:   · how much to take  · when to take this   1 spray, Nasal, 2 Times Daily, Administer 2 sprays in each nostril for each dose.          Continue These Medications      Instructions Start Date   ALLERGY SERUM SUBLINGUAL DROPS   Sublingual, Daily      amitriptyline 10 MG tablet  Commonly known as: ELAVIL   10 mg, Oral, Nightly      coenzyme Q10 100 MG capsule   100 mg, Oral, Daily      EPINEPHrine 0.3 MG/0.3ML solution auto-injector injection  Commonly known as: EPIPEN   No dose, route, or frequency recorded.       escitalopram 20 MG tablet  Commonly known as: LEXAPRO   1 tablet, Oral, Daily      famotidine 20 MG tablet  Commonly known as: PEPCID   20 mg, Oral, 2 Times Daily      gabapentin 600 MG tablet  Commonly known as: NEURONTIN   TAKE 2 TABLETS BY MOUTH  TWICE A DAY. MAXIMUM DAILY  AMOUNT: 2400MG      melatonin 5 MG tablet tablet   5 mg, Oral, Nightly      methadone 5 MG tablet  Commonly known as: DOLOPHINE   5 mg, Oral, 2 Times Daily      MULTI COMPLETE PO   Oral      Narcan 4 MG/0.1ML nasal spray  Generic drug: naloxone   No dose, route, or frequency recorded.      ondansetron 4 MG tablet  Commonly known as: ZOFRAN   4 mg, Oral, Every 6 Hours PRN, Take for nausea and vomiting.       Senna 8.6 MG capsule   1 capsule, Oral, Daily      sucralfate 1 GM/10ML suspension  Commonly known as: CARAFATE   10 mL, Oral, 4 Times Daily PRN      Synthroid 112 MCG tablet  Generic drug: levothyroxine   TAKE ONE TABLET BY MOUTH DAILY      tamsulosin 0.4 MG capsule 24 hr capsule  Commonly known as: FLOMAX   1 capsule, Oral, Daily      tiZANidine 4 MG tablet  Commonly known as: ZANAFLEX   4 mg, Oral, Every 6 Hours PRN      Unable to find   Does not apply, Domperidone POWD; 1 tablet twice daily.             Discharge Diet: Cardiac diet     Activity at Discharge: Post-cath restrictions    Discharge disposition: home    Follow-up Appointments  Future Appointments   Date Time Provider Department Center   11/9/2022 10:10 AM [facility]   11/15/2022 10:00 AM Aishwarya Briggs APRN [facility]   1/10/2023 10:00 AM [facility]   1/17/2023 10:30 AM Janelle Serna MD [facility]   9/12/2023 10:45 AM Cesar Hoover MD [facility]         Test Results Pending at Discharge       PEPPER Neal, [facility]  10/06/22  10:14 EDT    Time: Discharge 35 min  Electronically signed by PEPPER Neal, 10/06/22, 10:00 AM EDT.

## 2022-10-06 NOTE — OUTREACH NOTE
Prep Survey    Flowsheet Row Responses   Sp[facility] facility patient discharged from? [facility]   Is LACE score < 7 ? Yes   Emergency Room discharge w/ pulse ox? No   Eligibility [facility]   Date of Admission 10/05/22   Date of Discharge 10/06/22   Discharge Disposition Home or Self Care   Discharge diagnosis chest pressure, heart cath - non-obstructive CAD   Does the patient have one of the following disease processes/diagnoses(primary or secondary)? Other   Does the patient have Home health ordered? No   Is there a DME ordered? No   Prep survey completed? Yes          STEVEN WICK - Registered Nurse

## 2022-10-07 ENCOUNTER — TRANSITIONAL CARE MANAGEMENT TELEPHONE ENCOUNTER (OUTPATIENT)
Dept: CALL CENTER | Facility: HOSPITAL | Age: 62
End: 2022-10-07

## 2022-10-07 NOTE — OUTREACH NOTE
Call Center TCM Note    Flowsheet Row Responses   [facility] patient discharged from? [place]   Does the patient have one of the following disease processes/diagnoses(primary or secondary)? Other   TCM attempt successful? No   Unsuccessful attempts Attempt 1  [outdated PCP verbal release]   Call Status Left message          Carmen Coronado RN    10/7/2022, 08:39 EDT

## 2022-10-07 NOTE — OUTREACH NOTE
Call Center TCM Note    Flowsheet Row Responses   [facility] patient discharged from? [place]   Does the patient have one of the following disease processes/diagnoses(primary or secondary)? Other   TCM attempt successful? Yes   Call start time 1140   Call end time 1143   Discharge diagnosis chest pressure, heart cath - non-obstructive CAD   Person spoke with today (if not patient) and relationship patient   Meds reviewed with patient/caregiver? Yes  [New: isosorbide mononitrate]   Does the patient have all medications ordered at discharge? Yes   Is the patient taking all medications as directed (includes completed medication regime)? Yes   Comments Cesar Hoover MD PCP. Hospital follow up scheduled for 10/18  11am  [Patient following up with cardiology 10/13]   Has home health visited the patient within 72 hours of discharge? N/A   Psychosocial issues? No   Did the patient receive a copy of their discharge instructions? Yes   Nursing interventions Reviewed instructions with patient   What is the patient's perception of their health status since discharge? Improving   Is the patient/caregiver able to teach back signs and symptoms related to disease process for when to call PCP? Yes   Is the patient/caregiver able to teach back signs and symptoms related to disease process for when to call 911? Yes   Is the patient/caregiver able to teach back the hierarchy of who to call/visit for symptoms/problems? PCP, Specialist, Home health nurse, Urgent Care, ED, 911 Yes   If the patient is a current smoker, are they able to teach back resources for cessation? Not a smoker   TCM call completed? Yes   Wrap up additional comments Patient denies any needs today. Reports that he is just really tired.           Carmen Coronado RN    10/7/2022, 11:44 EDT

## 2022-10-10 ENCOUNTER — TELEPHONE (OUTPATIENT)
Dept: CARDIOLOGY | Facility: CLINIC | Age: 62
End: 2022-10-10

## 2022-10-10 NOTE — TELEPHONE ENCOUNTER
"Dr. Johnson/Taylor,    Pt called this afternoon. He said since starting the Imdur he has had a horrible headache, he feels like the \"base of his neck is going to explode\" and he has burning in his chest. He has been in bed since Thursday because he has felt so bad. He has tried Tylenol for his headache and it hasn't touched the pain. He has not taken his B/P or HR.    Do you have any recommendations for him?    Thank you,    Alayna Cooper RN  [place] [facility] [place]  10/10/22 16:07 EDT    "

## 2022-10-10 NOTE — TELEPHONE ENCOUNTER
Notified pt. He verbalized understanding.    Thank you,    Alayna Cooper, RN  [place] [facility] [place]  10/10/22 16:16 EDT

## 2022-10-19 ENCOUNTER — OFFICE VISIT (OUTPATIENT)
Dept: CARDIOLOGY | Facility: CLINIC | Age: 62
End: 2022-10-19

## 2022-10-19 VITALS
BODY MASS INDEX: 30.01 KG/M2 | HEART RATE: 94 BPM | WEIGHT: 233.8 LBS | HEIGHT: 74 IN | DIASTOLIC BLOOD PRESSURE: 76 MMHG | SYSTOLIC BLOOD PRESSURE: 128 MMHG

## 2022-10-19 DIAGNOSIS — R06.02 SHORTNESS OF BREATH: ICD-10-CM

## 2022-10-19 DIAGNOSIS — G47.33 OBSTRUCTIVE SLEEP APNEA, ADULT: ICD-10-CM

## 2022-10-19 DIAGNOSIS — I25.10 CHRONIC CORONARY ARTERY DISEASE: Primary | ICD-10-CM

## 2022-10-19 DIAGNOSIS — R07.2 PRECORDIAL PAIN: ICD-10-CM

## 2022-10-19 PROCEDURE — 93000 ELECTROCARDIOGRAM COMPLETE: CPT | Performed by: NURSE PRACTITIONER

## 2022-10-19 PROCEDURE — 99214 OFFICE O/P EST MOD 30 MIN: CPT | Performed by: NURSE PRACTITIONER

## 2022-10-19 RX ORDER — ASPIRIN 81 MG/1
81 TABLET ORAL DAILY
Start: 2022-10-19

## 2022-10-19 NOTE — PROGRESS NOTES
Subjective:     Encounter Date:10/19/2022      Patient ID: Joel Keating is a 62 y.o. male.    Chief Complaint:hospital follow up, CP  History of Present Illness  This is a 61 y/o man who follows with Dr. Johnson that I recently saw during his recent hospitalization. He has a past medical history of mild nonobstructive coronary artery disease, fibromyalgia, anxiety, hypothyroidism. He was seen in the office on 9/20 with complaints of chest pain occurring at rest and with exertion that seemed to be getting worse. Dr. Johnson ordered for the patient to undergo a stress test and echocardiogram His echocardiogram showed an EF of 57.8% with normal left ventricular systolic function, normal diastolic function and mild dilatation of the aortic root and proximal ascending aorta measuring about 4.0 cm. During his stress test, he was administered regadenoson and became disoriented and confused. He also had complaints of chest pain, left arm pain and diaphoresis. He had a brief syncopal episode as well. He was given reversal agent and remained confused so he was sent to the ED. He was ultimately admitted for further evaluation. He underwent a cardiac catheterization that showed stable nonobstructive coronary artery disease. At discharge, he was still having chest discomfort and fatigue. He was started on isosorbide for the chest pain. Unfortunately, this caused massive headaches and had to be stopped.    He is here today for a follow up visit. He continues to struggle with shortness of breath and dizziness. His dizziness is occurring daily. He has been seen in the past at the [facility] for POTS. He has chest pressure daily as well but says this is possibly a little better. The jaw pain has improved. He appears short of breath at times when talking with me today and it is obvious that it is very bothersome. He has not been evaluated by a pulmonologist recently. 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 He denies swelling in his lower extremities, orthopnea or PND.     I have reviewed and updated as appropriate allergies, current medications, past family history, past medical history, past surgical history and problem list.    Review of Systems   Constitutional: Positive for diaphoresis and malaise/fatigue. Negative for fever, weight gain and weight loss.   HENT: Negative for congestion, hoarse voice and sore throat.    Eyes: Negative for blurred vision and double vision.   Cardiovascular: Positive for chest pain and dyspnea on exertion. Negative for leg swelling, orthopnea, palpitations and syncope.   Respiratory: Positive for shortness of breath. Negative for cough and wheezing.    Gastrointestinal: Negative for abdominal pain, hematemesis, hematochezia and melena.   Genitourinary: Negative for dysuria and hematuria.   Neurological: Negative for dizziness, headaches, light-headedness and numbness.   Psychiatric/Behavioral: Negative for depression. The patient is nervous/anxious.          Current Outpatient Medications:   •  ALLERGY SERUM SUBLINGUAL DROPS, Place  under the tongue Daily., Disp: , Rfl:   •  amitriptyline (ELAVIL) 10 MG tablet, Take 10 mg by mouth Every Night., Disp: , Rfl:   •  coenzyme Q10 100 MG capsule, Take 100 mg by mouth Daily., Disp: , Rfl:   •  EPINEPHrine (EPIPEN) 0.3 MG/0.3ML solution auto-injector injection, , Disp: , Rfl:   •  escitalopram (LEXAPRO) 20 MG tablet, Take 1 tablet by mouth daily., Disp: , Rfl:   •  famotidine (PEPCID) 20 MG tablet, Take 20 mg by mouth 2 (Two) Times a Day., Disp: , Rfl:   •  fluticasone (FLONASE) 50 MCG/ACT nasal spray, 1 spray into each nostril 2 (two) times a day. Administer 2 sprays in each nostril for each dose. (Patient taking differently: 2 sprays into the nostril(s) as directed by provider Daily. Administer 2 sprays in each nostril for each dose.), Disp: 3  each, Rfl: 3  •  gabapentin (NEURONTIN) 600 MG tablet, TAKE 2 TABLETS BY MOUTH  TWICE A DAY. MAXIMUM DAILY  AMOUNT: 2400MG, Disp: , Rfl:   •  melatonin 5 MG tablet tablet, Take 5 mg by mouth Every Night., Disp: , Rfl:   •  methadone (DOLOPHINE) 5 MG tablet, Take 5 mg by mouth 2 (Two) Times a Day., Disp: , Rfl:   •  Multiple Vitamins-Minerals (MULTI COMPLETE PO), Take  by mouth., Disp: , Rfl:   •  Narcan 4 MG/0.1ML nasal spray, , Disp: , Rfl:   •  ondansetron (ZOFRAN) 4 MG tablet, Take 4 mg by mouth Every 6 (Six) Hours As Needed for Nausea or Vomiting. Take for nausea and vomiting., Disp: , Rfl:   •  Sennosides (SENNA) 8.6 MG capsule, Take 1 capsule by mouth Daily., Disp: , Rfl:   •  sucralfate (CARAFATE) 1 GM/10ML suspension, Take 10 mL by mouth 4 (four) times a day as needed., Disp: , Rfl:   •  Synthroid 112 MCG tablet, TAKE ONE TABLET BY MOUTH DAILY, Disp: 30 tablet, Rfl: 11  •  tamsulosin (FLOMAX) 0.4 MG capsule 24 hr capsule, Take 1 capsule by mouth daily., Disp: , Rfl:   •  tiZANidine (ZANAFLEX) 4 MG tablet, Take 4 mg by mouth Every 6 (Six) Hours As Needed., Disp: , Rfl:   •  Unable to find, Domperidone POWD; 1 tablet twice daily., Disp: , Rfl:   •  aspirin 81 MG EC tablet, Take 1 tablet by mouth Daily., Disp: , Rfl:     Past Medical History:   Diagnosis Date   • Abdominal pain     Recurrent sx and follows with Dr. Heard.   • Abscess of buttock    • Acute gastritis    • Allergic rhinitis    • Alopecia    • Angina pectoris (HCC)    • Anxiety    • Arteriosclerotic coronary artery disease    • Benign prostatic hypertrophy (BPH) with nocturia    • Calf tenderness    • Cervical neck pain with evidence of disc disease    • CSA (central sleep apnea) 09/14/2015    Overnight polysomnogram.  Weight 205 pounds.  AHI severely abnormal at 58 events per hour.  Central sleep apnea index 34 events per hour.  Obstructive sleep apnea index 24 events per hour.  No sleep-related hypoxia.  Auto SV titration performed 11/2/2015.    • DDD (degenerative disc disease), lumbar    • Degeneration of cervical intervertebral disc    • Depression    • Esophageal reflux     Follows with Dr. Jackson   • Fatigue    • Fever 04/2015    Low grade symptoms of fever for 3 weeks.   • Fibromyalgia    • Gastroparesis    • Health care maintenance    • Heart palpitations    • History of deep vein thrombophlebitis of lower extremity 2003    DVT Dx 2003   • Hypokalemia    • Hypothyroidism    • Idiopathic peripheral neuropathy    • Impaired fasting glucose    • Insomnia    • Lichen planus     tongue   • LOM (loss of memory)    • Mental status change     Episodes where he stares ahead and have some shaking of his limbs.  Possible seizure activity.  Referred to Dr. Santana, October 2, 2013.   • Nausea    • Onychomycosis of toenail    • GAURANG (obstructive sleep apnea) 05/08/2007    Overnight polysomnogram.  Weight 247 pounds.  AHI mildly abnormal at 11 events per hour without sleep-related hypoxia noted.   • Osteoarthritis of hip    • PN (peripheral neuropathy)    • Primary hypothyroidism    • Rash    • Tendonitis of left hip    • Tendonitis of right hip    • TIA (transient ischemic attack) 06/2016   • Tinea versicolor        Past Surgical History:   Procedure Laterality Date   • APPENDECTOMY     • BACK SURGERY      Cervical   • CARDIAC CATHETERIZATION N/A 10/5/2022    Procedure: Left Heart Cath;  Surgeon: Shanta Johnson MD;  Location:  [facility] C[facility] INVASIVE LOC[facility];  Service: Cardiology;  Laterality: N/A;   • CARDIAC CATHETERIZATION N/A 10/5/2022    Procedure: Coronary angiography;  Surgeon: Shanta Johnson MD;  Location:  [facility];  Service: Cardiology;  Laterality: N/A;   • CHOLECYSTECTOMY     • JOINT REPLACEMENT Bilateral     Hip   • LUMBAR FUSION     • SURAL NERVE/MUSCLE BIOPSY  04/2016   • TONSILLECTOMY         Family History   Problem Relation Age of Onset   • Heart failure Mother         Congestive   • Heart disease Mother    • Atrial  "fibrillation Mother    • Alcohol abuse Paternal Uncle    • Heart failure Paternal Grandmother         Congestive   • No Known Problems Father    • No Known Problems Sister    • No Known Problems Brother    • Heart failure Maternal Grandmother    • Heart disease Maternal Grandmother    • No Known Problems Maternal Grandfather    • No Known Problems Paternal Grandfather        Social History     Tobacco Use   • Smoking status: Never   • Smokeless tobacco: Former     Types: Chew   Vaping Use   • Vaping Use: Never used   Substance Use Topics   • Alcohol use: No   • Drug use: No         ECG 12 Lead    Date/Time: 10/19/2022 3:30 PM  Performed by: Taylor Rausch APRN  Authorized by: Taylor Rausch APRN   Comparison: compared with previous ECG from 10/5/2022  Similar to previous ECG  Rhythm: sinus rhythm  Comments: No significant change from previous EKG               Objective:     Visit Vitals  /76 (BP Location: Right arm)   Pulse 94   Ht 188 cm (74.02\")   Wt 106 kg (233 lb 12.8 oz)   BMI 30.00 kg/m²             Physical Exam  Constitutional:       Appearance: Normal appearance. He is normal weight.   HENT:      Head: Normocephalic.   Neck:      Vascular: No carotid bruit.   Cardiovascular:      Rate and Rhythm: Normal rate and regular rhythm.      Chest Wall: PMI is not displaced.      Pulses: Normal pulses.           Radial pulses are 2+ on the right side and 2+ on the left side.        Posterior tibial pulses are 2+ on the right side and 2+ on the left side.      Heart sounds: Normal heart sounds. No murmur heard.    No friction rub. No gallop.   Pulmonary:      Effort: Pulmonary effort is normal.      Breath sounds: Normal breath sounds.   Abdominal:      General: Bowel sounds are normal. There is no distension.      Palpations: Abdomen is soft.   Musculoskeletal:      Right lower leg: No edema.      Left lower leg: No edema.   Skin:     General: Skin is warm and dry.      Capillary Refill: Capillary refill " takes less than 2 seconds.   Neurological:      Mental Status: He is alert and oriented to person, place, and time.   Psychiatric:         Mood and Affect: Mood normal.         Behavior: Behavior normal.         Thought Content: Thought content normal.          Lab Review:   Lipid Panel    Lipid Panel 3/1/22 6/28/22   Total Cholesterol 173 209 (A)   Triglycerides 246 (A) 169 (A)   HDL Cholesterol 35 (A) 44   VLDL Cholesterol 42 (A) 30   LDL Cholesterol  96 135 (A)   (A) Abnormal value                Cardiac Procedures:   1. Cardiac catheterization 10/5/22:  CONCLUSION:  1.  Stable nonobstructive coronary artery disease:  *Stable 30 to 40% mid LAD stenosis and a moderate myocardial bridge involving the mid and distal LAD  *Normal left main, ramus intermedius, left circumflex arteries  *Stable 40 to 50% proximal RCA stenosis     RECOMMENDATIONS:  We will check a D-dimer.  Medical management of nonobstructive coronary artery disease.  Observe overnight.     FINDINGS:  SELECTIVE CORONARY ANGIOGRAMS:  1.  Left main artery: Large-caliber vessel with 0% stenosis.  The vessel trifurcates into left anterior descending, ramus intermedius, and left circumflex arteries.  2.  Left anterior descending artery: Large-caliber vessel with 0% proximal stenosis.  The mid vessel has 30 to 40% stenosis which is stable in appearance compared to prior angiograms.  Gives rise to small caliber diagonal branch with no significant disease.  Mid LAD then has a moderate myocardial bridge.  The distal LAD also appears to have a moderate myocardial bridge and is a moderate caliber vessel with otherwise no significant disease.  3.  Ramus intermedius: Large-caliber vessel with 0% stenosis.  4.  Left circumflex artery: Large-caliber vessel with 0% proximal to mid stenosis.  The distal vessel gives rise to a large caliber obtuse marginal branch with no significant disease.  The continuation of the circumflex vessel tapers to small caliber with no  significant stenosis.  5.  Right coronary artery: Large-caliber vessel with 40 to 50% proximal stenosis which is stable in appearance.  Mid to distal vessel has no significant disease.  The distal vessel bifurcates into a moderate caliber posterior descending and a small caliber posterolateral branch both with 0% stenosis.  This is a right dominant system.     LEFT VENTRICULAR HEMODYNAMICS:  1.  Left ventricular pressure: 96/11  2.  Aortic pressure: 99/31    2. Lexiscan stress test 10/5/22:  • Patient was given Lexiscan. Patient became disoriented and confused. Patient was given reversal agent of aminophylline 250 mcg. Despite that he continued to be confused. I personally evaluated patient and he was sent to the emergency room because his confusion was not clearing. He did not have any focal deficits.  3. Echocardiogram 10/5/22:  • Calculated left ventricular EF = 57.8% Estimated left ventricular EF was in agreement with the calculated left ventricular EF. Left ventricular systolic function is normal.  • Left ventricular diastolic function was normal.  • Mild dilatation of aortic root and proximal ascending aorta measuring about 4.0 cm.     4. Echocardiogram 11/3/21:  • Calculated left ventricular EF = 57.7% Estimated left ventricular EF = 58% Estimated left ventricular EF was in agreement with the calculated left ventricular EF. Left ventricular systolic function is normal. Normal left ventricular cavity size and wall thickness noted. All left ventricular wall segments contract normally. Left ventricular diastolic function is consistent with (grade I) impaired relaxation.  • Trace tricuspid valve regurgitation is present. Insufficient TR velocity profile to estimate the right ventricular systolic pressure.  • Aortic root = 4.0 cm Borderline dilation of the sinuses of Valsalva is present. Sinus of Valsalva = 3.6 cm Borderline dilation of the ascending aorta is present.      Assessment:         Diagnoses and all  orders for this visit:    1. Non-obstructive coronary artery disease (Primary)    2. Precordial pain    3. Obstructive sleep apnea, adult    Other orders  -     aspirin 81 MG EC tablet; Take 1 tablet by mouth Daily.            Plan:       1. CAD: non-obstructive per recent cardiac catheterization with 30-40% LAD stenosis and 40-50% RCA stenosis. I do not feel that his coronary disease is contributing to his symptoms. I am going to start him on a baby aspirin for preventative measures. He is not on a statin. LDL is 138. Given his symptoms, h/o pancreatitis, and fibromyalgia, I don't feel like statin therapy would be tolerated well. I have also discontinued isosorbide as he did not tolerate this medication well and it did not help with his chest discomfort.  2. Moderate myocardial bridge: noted on cath. I discussed with patient that most often, patients do not have symptoms from this. I offered to start a calcium channel blocker to see if it would help, but he wants to hold off for now.  3. Shortness of breath: I am going to refer to pulmonology. I am concerned that his exposure to chemicals during his career could possibly have caused damage to his lungs, contributing to his shortness of breath. If pulmonology workup is normal, would follow up with PCP for evaluation of other causes.  4. HTN: blood pressure well controlled.  5. GAURANG: CPAP compliant    Thank you for allowing me to participate in this patient's care. Please call with any questions or concerns. Mr. Keating will follow up with Dr. Johnson in 6-8 weeks.          Your medication list          Accurate as of October 19, 2022  3:06 PM. If you have any questions, ask your nurse or doctor.            START taking these medications      Instructions Last Dose Given Next Dose Due   aspirin 81 MG EC tablet  Started by: PEPPER Neal      Take 1 tablet by mouth Daily.          CHANGE how you take these medications      Instructions Last Dose Given Next Dose Due    fluticasone 50 MCG/ACT nasal spray  Commonly known as: FLONASE  What changed:   · how much to take  · when to take this      1 spray into each nostril 2 (two) times a day. Administer 2 sprays in each nostril for each dose.          CONTINUE taking these medications      Instructions Last Dose Given Next Dose Due   ALLERGY SERUM SUBLINGUAL DROPS      Place  under the tongue Daily.       amitriptyline 10 MG tablet  Commonly known as: ELAVIL      Take 10 mg by mouth Every Night.       coenzyme Q10 100 MG capsule      Take 100 mg by mouth Daily.       EPINEPHrine 0.3 MG/0.3ML solution auto-injector injection  Commonly known as: EPIPEN           escitalopram 20 MG tablet  Commonly known as: LEXAPRO      Take 1 tablet by mouth daily.       famotidine 20 MG tablet  Commonly known as: PEPCID      Take 20 mg by mouth 2 (Two) Times a Day.       gabapentin 600 MG tablet  Commonly known as: NEURONTIN      TAKE 2 TABLETS BY MOUTH  TWICE A DAY. MAXIMUM DAILY  AMOUNT: 2400MG       melatonin 5 MG tablet tablet      Take 5 mg by mouth Every Night.       methadone 5 MG tablet  Commonly known as: DOLOPHINE      Take 5 mg by mouth 2 (Two) Times a Day.       MULTI COMPLETE PO      Take  by mouth.       Narcan 4 MG/0.1ML nasal spray  Generic drug: naloxone           ondansetron 4 MG tablet  Commonly known as: ZOFRAN      Take 4 mg by mouth Every 6 (Six) Hours As Needed for Nausea or Vomiting. Take for nausea and vomiting.       Senna 8.6 MG capsule      Take 1 capsule by mouth Daily.       sucralfate 1 GM/10ML suspension  Commonly known as: CARAFATE      Take 10 mL by mouth 4 (four) times a day as needed.       Synthroid 112 MCG tablet  Generic drug: levothyroxine      TAKE ONE TABLET BY MOUTH DAILY       tamsulosin 0.4 MG capsule 24 hr capsule  Commonly known as: FLOMAX      Take 1 capsule by mouth daily.       tiZANidine 4 MG tablet  Commonly known as: ZANAFLEX      Take 4 mg by mouth Every 6 (Six) Hours As Needed.       Unable to  find      Domperidone POWD; 1 tablet twice daily.          STOP taking these medications    isosorbide mononitrate 30 MG 24 hr tablet  Commonly known as: IMDUR  Stopped by: PEPPER Neal              Where to Get Your Medications      Information about where to get these medications is not yet available    Ask your nurse or doctor about these medications  · aspirin 81 MG EC tablet           PEPPER Neal  10/19/22  10:42 AM EDT

## 2022-12-09 ENCOUNTER — TRANSCRIBE ORDERS (OUTPATIENT)
Dept: ADMINISTRATIVE | Facility: HOSPITAL | Age: 62
End: 2022-12-09

## 2022-12-09 DIAGNOSIS — J84.9 ILD (INTERSTITIAL LUNG DISEASE): Primary | ICD-10-CM

## 2023-01-17 ENCOUNTER — OFFICE VISIT (OUTPATIENT)
Dept: ENDOCRINOLOGY | Age: 63
End: 2023-01-17
Payer: COMMERCIAL

## 2023-01-17 ENCOUNTER — PATIENT ROUNDING (BHMG ONLY) (OUTPATIENT)
Dept: ENDOCRINOLOGY | Age: 63
End: 2023-01-17
Payer: COMMERCIAL

## 2023-01-17 VITALS
TEMPERATURE: 92.2 F | WEIGHT: 225.4 LBS | BODY MASS INDEX: 28.93 KG/M2 | OXYGEN SATURATION: 96 % | SYSTOLIC BLOOD PRESSURE: 118 MMHG | HEART RATE: 78 BPM | DIASTOLIC BLOOD PRESSURE: 80 MMHG | HEIGHT: 74 IN

## 2023-01-17 DIAGNOSIS — I25.10 CHRONIC CORONARY ARTERY DISEASE: ICD-10-CM

## 2023-01-17 DIAGNOSIS — E03.9 ACQUIRED HYPOTHYROIDISM: Primary | ICD-10-CM

## 2023-01-17 DIAGNOSIS — G47.33 OBSTRUCTIVE SLEEP APNEA, ADULT: ICD-10-CM

## 2023-01-17 DIAGNOSIS — M79.7 FIBROMYALGIA: ICD-10-CM

## 2023-01-17 PROCEDURE — 99214 OFFICE O/P EST MOD 30 MIN: CPT | Performed by: INTERNAL MEDICINE

## 2023-01-17 RX ORDER — TERBINAFINE HYDROCHLORIDE 250 MG/1
TABLET ORAL
COMMUNITY
Start: 2023-01-03

## 2023-01-17 NOTE — LETTER
January 17, 2023     Cesar Hoover MD  [id number] [facility]  [address]  [place] [id number]    Patient: Joel Keating   YOB: 1960   Date of Visit: 1/17/2023       Dear Dr. Kiko MD:    Thank you for referring Joel Keating to me for evaluation. Below are the relevant portions of my assessment and plan of care.    If you have questions, please do not hesitate to call me. I look forward to following Joel along with you.         Sincerely,        Alfonso Trevizo MD        CC: MD Shanta Bueno MD Timothy Beacham, MD Raja M Kaikaus, MD Mark Paul Bazant, MD Yson, Alfonso ARMENTA MD  01/17/23 1613  Signed  Subjective    Joel Keating is a 62 y.o. male.     History of Present Illness     Patient is a 62-year-old male who came in for follow-up.  He is new to me.    He has primary hypothyroidism and is on Synthroid 112 mcg/day.  He has no history of goiter or head/neck radiation therapy.    He has prediabetes and is not on medications.  He had serum glucose 206 mg per DL after he passed out during a nuclear stress test.  He does not know whether he was given steroids or epinephrine at that time.  He has gained 2 pounds since March 2022.  His maternal grandmother had diabetes mellitus.    Cardiac catheterization done in October 2022 showed 30 to 40% stenosis of the mid LAD and 40 to 50% stenosis of the proximal RCA.    He has hyperlipidemia and is on diet alone.  Lipid panel done in December 2022 are as follows: Cholesterol 152.  HDL 48.  LDL 68.  Triglycerides 219.    He has sleep apnea and is seeing Dr. Tamez.  He is unable to tolerate the CPAP.  He has tried a dental appliance which did not help.  He has seen Dr. Chinchilla and Dr. Jacob and was offered inspire therapy.    He has chronic pain and is on methadone prescribed by Dr. Back and gabapentin prescribed by Dr. Hawkins.  He has been seen in [place] and [facility].    The following portions of the patient's history were  "reviewed and updated as appropriate: allergies, current medications, past family history, past medical history, past social history, past surgical history and problem list.    Review of Systems   Eyes: Negative for visual disturbance.   Respiratory: Negative for shortness of breath and wheezing.    Cardiovascular: Negative for chest pain and palpitations.   Gastrointestinal: Positive for constipation (had colonoscopy in Dec 2022).   Genitourinary: Negative.    Musculoskeletal: Negative for myalgias.   Neurological: Negative for numbness.     Vitals:    01/17/23 1129   BP: 118/80   Pulse: 78   Temp: 92.2 °F (33.4 °C)   TempSrc: Temporal   SpO2: 96%   Weight: 102 kg (225 lb 6.4 oz)   Height: 188 cm (74\")      Objective    Physical Exam  Constitutional:       General: He is not in acute distress.     Appearance: Normal appearance. He is not ill-appearing or toxic-appearing.   Eyes:      General: No scleral icterus.        Right eye: No discharge.         Left eye: No discharge.      Extraocular Movements: Extraocular movements intact.      Conjunctiva/sclera: Conjunctivae normal.   Neck:      Vascular: No carotid bruit.   Cardiovascular:      Rate and Rhythm: Normal rate and regular rhythm.      Heart sounds: Normal heart sounds.   Pulmonary:      Effort: Pulmonary effort is normal. No respiratory distress.      Breath sounds: Normal breath sounds. No rales.   Chest:      Chest wall: No tenderness.   Abdominal:      General: Bowel sounds are normal.      Palpations: Abdomen is soft.      Tenderness: There is no right CVA tenderness or left CVA tenderness.   Musculoskeletal:      Right lower leg: No edema.      Left lower leg: No edema.   Lymphadenopathy:      Cervical: No cervical adenopathy.   Skin:     General: Skin is warm.   Neurological:      Mental Status: He is alert and oriented to person, place, and time.   Psychiatric:         Mood and Affect: Mood normal.         Behavior: Behavior normal.       Admission on " 12/14/2022, Discharged on 12/14/2022   Component Date Value Ref Range Status   • COVID19 12/14/2022 Not Detected   Final   • Influenza A Antigen KANDY 12/14/2022 Detected (A)   Final   • Influenza B Antigen KANDY 12/14/2022 Not Detected   Final   • Internal Control 12/14/2022 Passed   Final   • Lot Number 12/14/2022 [id number]   Final   • Expiration Date 12/14/2022 [id number]   Final     Assessment & Plan   Diagnoses and all orders for this visit:    1. Acquired hypothyroidism (Primary)    2. Non-obstructive coronary artery disease    3. Fibromyalgia    4. Obstructive sleep apnea, adult      Continue Synthroid 112 mcg/day.  Advised to discuss about statin therapy with Dr. Johnson  Follow-up with Dr. Back.  Follow-up with Dr. Tamez.    Copy of my note sent to Dr. Hoover, Dr. Tamez, Dr. Johnson, Dr. Back, Dr. Jackson and Dr. Hawkins.    RTC 6 mos.

## 2023-02-06 ENCOUNTER — OFFICE VISIT (OUTPATIENT)
Dept: INTERNAL MEDICINE | Facility: CLINIC | Age: 63
End: 2023-02-06
Payer: COMMERCIAL

## 2023-02-06 VITALS
SYSTOLIC BLOOD PRESSURE: 114 MMHG | BODY MASS INDEX: 30.25 KG/M2 | DIASTOLIC BLOOD PRESSURE: 74 MMHG | HEIGHT: 74 IN | OXYGEN SATURATION: 97 % | HEART RATE: 88 BPM | WEIGHT: 235.7 LBS

## 2023-02-06 DIAGNOSIS — M79.7 FIBROMYALGIA: ICD-10-CM

## 2023-02-06 DIAGNOSIS — I25.10 CHRONIC CORONARY ARTERY DISEASE: Primary | ICD-10-CM

## 2023-02-06 DIAGNOSIS — F33.1 MODERATE EPISODE OF RECURRENT MAJOR DEPRESSIVE DISORDER: ICD-10-CM

## 2023-02-06 PROCEDURE — 99214 OFFICE O/P EST MOD 30 MIN: CPT | Performed by: FAMILY MEDICINE

## 2023-02-06 NOTE — PROGRESS NOTES
"Chief Complaint  follow up to fibromyalgia depression    Subjective        Joel Keating presents to [facility]  History of Present Illness  Patient follows up to have his annual disability form filled out for ongoing chronic pain and fibromyalgia syndrome.  Long term disability  Objective   Vital Signs:  /74 (BP Location: Right arm, Patient Position: Sitting, Cuff Size: Adult)   Pulse 88   Ht 188 cm (74\")   Wt 107 kg (235 lb 11.2 oz)   SpO2 97%   BMI 30.26 kg/m²   Estimated body mass index is 30.26 kg/m² as calculated from the following:    Height as of this encounter: 188 cm (74\").    Weight as of this encounter: 107 kg (235 lb 11.2 oz).             Physical Exam  Vitals reviewed.   Constitutional:       Appearance: Normal appearance.   HENT:      Head: Normocephalic and atraumatic.   Cardiovascular:      Rate and Rhythm: Normal rate and regular rhythm.      Pulses: Normal pulses.      Heart sounds: Normal heart sounds.   Pulmonary:      Effort: Pulmonary effort is normal.      Breath sounds: Normal breath sounds.   Musculoskeletal:      Comments: Trigger points upper and lower extremities   Skin:     General: Skin is warm and dry.   Neurological:      General: No focal deficit present.      Mental Status: He is alert.   Psychiatric:         Behavior: Behavior normal.         Thought Content: Thought content normal.         Judgment: Judgment normal.      Comments: Depressed mood        Result Review :    Common labs    Common Labs 10/5/22 10/5/22 10/6/22 12/12/22    1025 1025     Glucose  101 (A) 206 (A)    BUN  18 15    Creatinine  1.35 (A) 1.16    Sodium  140 134 (A)    Potassium  3.9 4.3    Chloride  100 101    Calcium  10.5 9.0    Albumin  5.30 (A)     Total Bilirubin  0.6     Alkaline Phosphatase  111     AST (SGOT)  17     ALT (SGPT)  21     WBC 8.35      Hemoglobin 15.9      Hematocrit 46.3      Platelets 249      Total Cholesterol    152   Triglycerides    219 (A) "   HDL Cholesterol    48   LDL Cholesterol     68   (A) Abnormal value       Comments are available for some flowsheets but are not being displayed.                        Assessment and Plan   Diagnoses and all orders for this visit:    1. Non-obstructive coronary artery disease (Primary)    2. Moderate episode of recurrent major depressive disorder (HCC)    3. Fibromyalgia    Or myalgia continue amitriptyline gabapentin tizanidine  Obstructive coronary artery disease gradual increase in physical activity  Depression continue escitalopram         Follow Up   No follow-ups on file.  Patient was given instructions and counseling regarding his condition or for health maintenance advice. Please see specific information pulled into the AVS if appropriate.

## 2023-02-20 ENCOUNTER — TELEPHONE (OUTPATIENT)
Dept: CARDIOLOGY | Facility: CLINIC | Age: 63
End: 2023-02-20

## 2023-02-20 NOTE — TELEPHONE ENCOUNTER
Caller: Joel Keating    Relationship: Self    Best call back number:[phone]    What form or medical record are you requesting: MOST RECENT EKG    Who is requesting this form or medical record from you: [employer]    How would you like to receive the form or medical records (pick-up, mail, fax):FAX  If fax, what is the fax number: [phone]      Timeframe paperwork needed:THIS WEEK  Additional notes: ATTEMPTED TO TRANSFER TO [facility]. UNABLE TO TRANSFER

## 2023-03-14 ENCOUNTER — TELEPHONE (OUTPATIENT)
Dept: CARDIOLOGY | Facility: CLINIC | Age: 63
End: 2023-03-14

## 2023-03-14 NOTE — TELEPHONE ENCOUNTER
Caller: SOCO    Relationship: SELF    Best call back number: [phone]    What is the best time to reach you: ANY        What was the call regarding: PT HAD TO CANCEL 2MO F/U ON 3/14/23 DUE TO BEING SICK.  FIRST AVAIL SHOWING WAS AUGUST 2023.    Do you require a callback:YES

## 2023-08-01 RX ORDER — LEVOTHYROXINE SODIUM 112 MCG
112 TABLET ORAL DAILY
Qty: 90 TABLET | Refills: 0 | Status: SHIPPED | OUTPATIENT
Start: 2023-08-01 | End: 2023-10-30

## 2023-08-11 ENCOUNTER — OFFICE VISIT (OUTPATIENT)
Dept: CARDIOLOGY | Facility: CLINIC | Age: 63
End: 2023-08-11
Payer: COMMERCIAL

## 2023-08-11 VITALS
HEART RATE: 77 BPM | OXYGEN SATURATION: 93 % | DIASTOLIC BLOOD PRESSURE: 70 MMHG | HEIGHT: 74 IN | WEIGHT: 230.2 LBS | BODY MASS INDEX: 29.54 KG/M2 | SYSTOLIC BLOOD PRESSURE: 120 MMHG

## 2023-08-11 DIAGNOSIS — I25.10 CHRONIC CORONARY ARTERY DISEASE: Primary | ICD-10-CM

## 2023-08-11 PROBLEM — R07.2 PRECORDIAL PAIN: Status: RESOLVED | Noted: 2020-09-04 | Resolved: 2023-08-11

## 2023-08-11 PROCEDURE — 93000 ELECTROCARDIOGRAM COMPLETE: CPT | Performed by: INTERNAL MEDICINE

## 2023-08-11 PROCEDURE — 99214 OFFICE O/P EST MOD 30 MIN: CPT | Performed by: INTERNAL MEDICINE

## 2023-08-11 RX ORDER — ATORVASTATIN CALCIUM 10 MG/1
10 TABLET, FILM COATED ORAL DAILY
Qty: 30 TABLET | Refills: 5 | Status: SHIPPED | OUTPATIENT
Start: 2023-08-11

## 2023-08-11 NOTE — LETTER
August 11, 2023       No Recipients    Patient: Joel Keating   YOB: 1960   Date of Visit: 8/11/2023       Dear Cesar Hoover MD    Joel Keatign was in my office today. Below is a copy of my note.    If you have questions, please do not hesitate to call me. I look forward to following Joel along with you.         Sincerely,        Shanta Johnson MD        CC:   No Recipients        Subjective:     Encounter Date:08/11/2023      Patient ID: Joel Keating is a 63 y.o. male.    Chief Complaint:  History of Present Illness    This is a 62-year-old with nonobstructive coronary artery disease, fibromyalgia, anxiety, hypothyroidism, who presents for follow-up.      An office visit with me in 9/2022 he reported symptoms of worsening fatigue and ongoing episodes of chest pressure.  Recommended proceeding with a stress test and an echocardiogram.  He underwent testing on 10/5/2022.  His echocardiogram was unremarkable except for mildly elevated dilated aorta.  During the stress portion of the stress test he received Lexiscan infusion and began developing chest pressure radiating to his left arm and jaw.  He was given aminophylline to reverse his symptoms but they continued and actually developed a brief syncopal episode per the staff.  He was sent to the emergency room where he appeared to be confused.  Following his arrival to the emergency room his troponins were unremarkable and EKG unchanged.  Confusion resolved.  Chest pressure continued despite administration of nitroglycerin, IV fentanyl, and IV morphine.  Based on his ongoing symptoms I recommended proceeding with cardiac catheterization which was performed the same day.  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  He was observed overnight.  A D-dimer was checked and was normal.  He was discharged home the following day.    He was tried on isosorbide mononitrate but developed a significant headache.  He saw PEPPER Neal in follow-up on 10/19/2022.  He continued to complain of shortness of breath and dizziness.  He was still having episodes of chest pressure although this was little bit better.  Calcium channel blocker was offered due to the finding of a myocardial bridge but the patient wanted to hold off.  He was referred to pulmonary regarding his shortness of breath.    Since seen pulmonary and there was some concern for pulmonary fibrosis.  High-resolution CT scan was recommended.  However it does not appear this was ever performed.  There was also some discussion about repeating a sleep study since her prior sleep study showed evidence of moderate to severe sleep apnea and the patient did not tolerate CPAP but had lost 40 pounds since that time.  But the patient also declined at the time.       Prior History:  Patient was previously followed by Dr. Ventura.  He began seeing Dr. Ventura in 2014 when he presented to the hospital with symptoms of unstable angina.  He underwent a cardiac catheterization at that time that showed mild nonobstructive coronary artery disease and small myocardial bridge of the LAD.  Since then he has followed up with Dr. Ventura on a yearly basis.  He was last seen by Dr. Ventura in 7/2017 at which time he appeared to be stable.  He was last seen in the office by ELIAS Huynh and 8/2019 at which time he appeared to be stable from a cardiac standpoint.     Exam initially in 9/2020 when he presented for follow-up.  He reported at that office visit that he had began seeing Dr. Mckenna with infectious disease in 12/2019 for daily mild fevers.  His work-up included an echocardiogram which was performed on 3/2020 which showed normal left ventricular systolic function, EF of 60%, grade 1 diastolic  dysfunction, no significant valvular disease.  At the time of his office visit he reported episodes of daily chest pressure radiating to his jaw and left shoulder and associated with diaphoresis.  Symptoms do not extremely occur with exertion.  He also reported an episode of syncope while raising from a seated position.  Their office visit recommended proceeding with further work-up with a stress test and a ZIO monitor.  ZIO monitor was unremarkable.  Her stress test showed no evidence of ischemia.     Patient was not seen back in follow-up until 10/2021 when he was evaluated by JULIANN Veloz  Who presented to the office at that time with complaints of a syncopal episode without any preceding symptoms.  His ZIO monitor and echocardiogram were recommended at that time.  His ZIO monitor was unremarkable.  His echocardiogram continues showed normal left ventricular systolic function wall motion with EF of 58%, grade 1 diastolic dysfunction, no significant valvular disease, and borderline dilatation of his aorta with an aortic root of 4 cm.  He also had lab work performed at that office visit including a normal D-dimer.       ROS      Current Outpatient Medications:     ALLERGY SERUM SUBLINGUAL DROPS, Place  under the tongue Daily., Disp: , Rfl:     amitriptyline (ELAVIL) 10 MG tablet, Take 10 mg by mouth Every Night., Disp: , Rfl:     aspirin 81 MG EC tablet, Take 1 tablet by mouth Daily., Disp: , Rfl:     coenzyme Q10 100 MG capsule, Take 100 mg by mouth Daily., Disp: , Rfl:     EPINEPHrine (EPIPEN) 0.3 MG/0.3ML solution auto-injector injection, , Disp: , Rfl:     escitalopram (LEXAPRO) 20 MG tablet, Take 1 tablet by mouth daily., Disp: , Rfl:     famotidine (PEPCID) 20 MG tablet, Take 20 mg by mouth 2 (Two) Times a Day., Disp: , Rfl:     fluticasone (FLONASE) 50 MCG/ACT nasal spray, 1 spray into each nostril 2 (two) times a day. Administer 2 sprays in each nostril for each dose. (Patient taking  differently: 2 sprays into the nostril(s) as directed by provider Daily. Administer 2 sprays in each nostril for each dose.), Disp: 3 each, Rfl: 3    gabapentin (NEURONTIN) 600 MG tablet, TAKE 2 TABLETS BY MOUTH  TWICE A DAY. MAXIMUM DAILY  AMOUNT: 2400MG, Disp: , Rfl:     melatonin 5 MG tablet tablet, Take 10 mg by mouth Every Night., Disp: , Rfl:     methadone (DOLOPHINE) 5 MG tablet, Take 5 mg by mouth 2 (Two) Times a Day., Disp: , Rfl:     Multiple Vitamins-Minerals (MULTI COMPLETE PO), Take  by mouth., Disp: , Rfl:     Narcan 4 MG/0.1ML nasal spray, , Disp: , Rfl:     ondansetron (ZOFRAN) 4 MG tablet, Take 4 mg by mouth Every 6 (Six) Hours As Needed for Nausea or Vomiting. Take for nausea and vomiting., Disp: , Rfl:     Sennosides (SENNA) 8.6 MG capsule, Take 1 capsule by mouth Daily., Disp: , Rfl:     sucralfate (CARAFATE) 1 GM/10ML suspension, Take 10 mL by mouth 4 (four) times a day as needed., Disp: , Rfl:     Synthroid 112 MCG tablet, Take 1 tablet by mouth Daily for 90 days., Disp: 90 tablet, Rfl: 0    tamsulosin (FLOMAX) 0.4 MG capsule 24 hr capsule, Take 1 capsule by mouth daily., Disp: , Rfl:     terbinafine (lamiSIL) 250 MG tablet, , Disp: , Rfl:     tiZANidine (ZANAFLEX) 4 MG tablet, Take 4 mg by mouth Every 6 (Six) Hours As Needed., Disp: , Rfl:     Unable to find, Domperidone POWD; 1 tablet twice daily., Disp: , Rfl:     Unable to find, Domperidone 10 mg once daily, Disp: , Rfl:     Past Medical History:   Diagnosis Date    Abdominal pain     Recurrent sx and follows with Dr. Heard.    Abscess of buttock     Acute gastritis     Allergic rhinitis     Alopecia     Angina pectoris     Anxiety     Arteriosclerotic coronary artery disease     Benign prostatic hypertrophy (BPH) with nocturia     Calf tenderness     Cervical neck pain with evidence of disc disease     CSA (central sleep apnea) 09/14/2015    Overnight polysomnogram.  Weight 205 pounds.  AHI severely abnormal at  58 events per hour.  Central sleep apnea index 34 events per hour.  Obstructive sleep apnea index 24 events per hour.  No sleep-related hypoxia.  Auto SV titration performed 11/2/2015.    DDD (degenerative disc disease), lumbar     Degeneration of cervical intervertebral disc     Depression     Esophageal reflux     Follows with Dr. Jackson    Fatigue     Fever 04/2015    Low grade symptoms of fever for 3 weeks.    Fibromyalgia     Gastroparesis     Health care maintenance     Heart palpitations     History of deep vein thrombophlebitis of lower extremity 2003    DVT Dx 2003    Hypokalemia     Hypothyroidism     Idiopathic peripheral neuropathy     Impaired fasting glucose     Insomnia     Lichen planus     tongue    LOM (loss of memory)     Mental status change     Episodes where he stares ahead and have some shaking of his limbs.  Possible seizure activity.  Referred to Dr. Santana, October 2, 2013.    Nausea     Onychomycosis of toenail     GAURANG (obstructive sleep apnea) 05/08/2007    Overnight polysomnogram.  Weight 247 pounds.  AHI mildly abnormal at 11 events per hour without sleep-related hypoxia noted.    Osteoarthritis of hip     PN (peripheral neuropathy)     Primary hypothyroidism     Rash     Tendonitis of left hip     Tendonitis of right hip     TIA (transient ischemic attack) 06/2016    Tinea versicolor        Past Surgical History:   Procedure Laterality Date    APPENDECTOMY      BACK SURGERY      Cervical    CARDIAC CATHETERIZATION N/A 10/05/2022    Procedure: Left Heart Cath;  Surgeon: Shanta Johnson MD;  Location:  [facility];  Service: Cardiology;  Laterality: N/A;    CARDIAC CATHETERIZATION N/A 10/05/2022    Procedure: Coronary angiography;  Surgeon: Shanta Johnson MD;  Location:  [facility];  Service: Cardiology;  Laterality: N/A;    CHOLECYSTECTOMY      COLONOSCOPY  12/2022    Normal.  Dr. Jackson    JOINT REPLACEMENT  Bilateral     Hip    LUMBAR FUSION      SURAL NERVE/MUSCLE BIOPSY  04/2016    TONSILLECTOMY         Family History   Problem Relation Age of Onset    Heart failure Mother         Congestive    Heart disease Mother     Atrial fibrillation Mother     Alcohol abuse Paternal Uncle     Heart failure Paternal Grandmother         Congestive    No Known Problems Father     No Known Problems Sister     No Known Problems Brother     Heart failure Maternal Grandmother     Heart disease Maternal Grandmother     No Known Problems Maternal Grandfather     No Known Problems Paternal Grandfather        Social History     Tobacco Use    Smoking status: Never     Passive exposure: Never    Smokeless tobacco: Former     Types: Chew   Vaping Use    Vaping Use: Never used   Substance Use Topics    Alcohol use: No    Drug use: No       Procedures       Objective:     There were no vitals taken for this visit.      Physical Exam    Lab Review:       Assessment:         No diagnosis found.       Plan:

## 2023-08-11 NOTE — PROGRESS NOTES
Subjective:     Encounter Date:08/11/2023      Patient ID: Joel Keating is a 63 y.o. male.    Chief Complaint:  History of Present Illness    This is a 62-year-old with nonobstructive coronary artery disease, fibromyalgia, anxiety, hypothyroidism, who presents for follow-up.      An office visit with me in 9/2022 he reported symptoms of worsening fatigue and ongoing episodes of chest pressure.  Recommended proceeding with a stress test and an echocardiogram.  He underwent testing on 10/5/2022.  His echocardiogram was unremarkable except for mildly elevated dilated aorta.  During the stress portion of the stress test he received Lexiscan infusion and began developing chest pressure radiating to his left arm and jaw.  He was given aminophylline to reverse his symptoms but they continued and actually developed a brief syncopal episode per the staff.  He was sent to the emergency room where he appeared to be confused.  Following his arrival to the emergency room his troponins were unremarkable and EKG unchanged.  Confusion resolved.  Chest pressure continued despite administration of nitroglycerin, IV fentanyl, and IV morphine.  Based on his ongoing symptoms I recommended proceeding with cardiac catheterization which was performed the same day.  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  He was observed overnight.  A D-dimer was checked and was normal.  He was discharged home the following day.    He was tried on isosorbide mononitrate but developed a significant headache.  He saw PEPPER Neal in follow-up on 10/19/2022.  He continued to complain of shortness of breath and dizziness.  He was still having episodes of chest pressure although this was little bit better.   Calcium channel blocker was offered due to the finding of a myocardial bridge but the patient wanted to hold off.  He was referred to pulmonary regarding his shortness of breath.    Since seen pulmonary and there was some concern for pulmonary fibrosis.  High-resolution CT scan was recommended.  However it does not appear this was ever performed.  There was also some discussion about repeating a sleep study since her prior sleep study showed evidence of moderate to severe sleep apnea and the patient did not tolerate CPAP but had lost 40 pounds since that time.  But the patient also declined at the time.    He presents today for follow-up.  He denies any further chest pain.  Shortness of breath and fatigue are largely stable.  Denies any palpitations, near-syncope or syncope.  He has chronic issues with lightheadedness which is unchanged.     Prior History:  Patient was previously followed by Dr. Ventura.  He began seeing Dr. Ventura in 2014 when he presented to the hospital with symptoms of unstable angina.  He underwent a cardiac catheterization at that time that showed mild nonobstructive coronary artery disease and small myocardial bridge of the LAD.  Since then he has followed up with Dr. Ventura on a yearly basis.  He was last seen by Dr. Ventura in 7/2017 at which time he appeared to be stable.  He was last seen in the office by ELIAS Huynh and 8/2019 at which time he appeared to be stable from a cardiac standpoint.     Exam initially in 9/2020 when he presented for follow-up.  He reported at that office visit that he had began seeing Dr. Mckenna with infectious disease in 12/2019 for daily mild fevers.  His work-up included an echocardiogram which was performed on 3/2020 which showed normal left ventricular systolic function, EF of 60%, grade 1 diastolic dysfunction, no significant valvular disease.  At the time of his office visit he reported episodes of daily chest pressure radiating to his jaw and left shoulder and  associated with diaphoresis.  Symptoms do not extremely occur with exertion.  He also reported an episode of syncope while raising from a seated position.  Their office visit recommended proceeding with further work-up with a stress test and a ZIO monitor.  ZIO monitor was unremarkable.  Her stress test showed no evidence of ischemia.     Patient was not seen back in follow-up until 10/2021 when he was evaluated by JULIANN Veloz  Who presented to the office at that time with complaints of a syncopal episode without any preceding symptoms.  His ZIO monitor and echocardiogram were recommended at that time.  His ZIO monitor was unremarkable.  His echocardiogram continues showed normal left ventricular systolic function wall motion with EF of 58%, grade 1 diastolic dysfunction, no significant valvular disease, and borderline dilatation of his aorta with an aortic root of 4 cm.  He also had lab work performed at that office visit including a normal D-dimer.       Review of Systems   Constitutional: Positive for malaise/fatigue.   HENT:  Negative for hearing loss, hoarse voice, nosebleeds and sore throat.    Eyes:  Negative for pain.   Cardiovascular:  Positive for dyspnea on exertion. Negative for chest pain, claudication, cyanosis, irregular heartbeat, leg swelling, near-syncope, orthopnea, palpitations, paroxysmal nocturnal dyspnea and syncope.   Respiratory:  Negative for shortness of breath and snoring.    Endocrine: Negative for cold intolerance, heat intolerance, polydipsia, polyphagia and polyuria.   Skin:  Negative for itching and rash.   Musculoskeletal:  Positive for joint pain. Negative for arthritis, falls, joint swelling, muscle cramps, muscle weakness and myalgias.   Gastrointestinal:  Negative for constipation, diarrhea, dysphagia, heartburn, hematemesis, hematochezia, melena, nausea and vomiting.   Genitourinary:  Negative for frequency, hematuria and hesitancy.   Neurological:  Positive for  light-headedness. Negative for excessive daytime sleepiness, dizziness, headaches, numbness and weakness.   Psychiatric/Behavioral:  Negative for depression. The patient is not nervous/anxious.        Current Outpatient Medications:     ALLERGY SERUM SUBLINGUAL DROPS, Place  under the tongue Daily., Disp: , Rfl:     amitriptyline (ELAVIL) 10 MG tablet, Take 1 tablet by mouth Every Night., Disp: , Rfl:     aspirin 81 MG EC tablet, Take 1 tablet by mouth Daily., Disp: , Rfl:     coenzyme Q10 100 MG capsule, Take 1 capsule by mouth Daily., Disp: , Rfl:     EPINEPHrine (EPIPEN) 0.3 MG/0.3ML solution auto-injector injection, , Disp: , Rfl:     escitalopram (LEXAPRO) 20 MG tablet, Take 1 tablet by mouth Daily., Disp: , Rfl:     famotidine (PEPCID) 20 MG tablet, Take 1 tablet by mouth 2 (Two) Times a Day., Disp: , Rfl:     fluticasone (FLONASE) 50 MCG/ACT nasal spray, 1 spray into each nostril 2 (two) times a day. Administer 2 sprays in each nostril for each dose. (Patient taking differently: 2 sprays into the nostril(s) as directed by provider Daily. Administer 2 sprays in each nostril for each dose.), Disp: 3 each, Rfl: 3    gabapentin (NEURONTIN) 600 MG tablet, TAKE 2 TABLETS BY MOUTH  TWICE A DAY. MAXIMUM DAILY  AMOUNT: 2400MG, Disp: , Rfl:     melatonin 5 MG tablet tablet, Take 2 tablets by mouth Every Night., Disp: , Rfl:     methadone (DOLOPHINE) 5 MG tablet, Take 1 tablet by mouth 2 (Two) Times a Day., Disp: , Rfl:     Multiple Vitamins-Minerals (MULTI COMPLETE PO), Take  by mouth., Disp: , Rfl:     Narcan 4 MG/0.1ML nasal spray, , Disp: , Rfl:     ondansetron (ZOFRAN) 4 MG tablet, Take 1 tablet by mouth Every 6 (Six) Hours As Needed for Nausea or Vomiting. Take for nausea and vomiting., Disp: , Rfl:     Sennosides (SENNA) 8.6 MG capsule, Take 1 capsule by mouth Daily., Disp: , Rfl:     sucralfate (CARAFATE) 1 GM/10ML suspension, Take 10 mL by mouth 4 (Four) Times a Day As Needed., Disp: , Rfl:     Synthroid 112  MCG tablet, Take 1 tablet by mouth Daily for 90 days., Disp: 90 tablet, Rfl: 0    tamsulosin (FLOMAX) 0.4 MG capsule 24 hr capsule, Take 1 capsule by mouth Daily., Disp: , Rfl:     terbinafine (lamiSIL) 250 MG tablet, , Disp: , Rfl:     tiZANidine (ZANAFLEX) 4 MG tablet, Take 1 tablet by mouth Every 6 (Six) Hours As Needed., Disp: , Rfl:     Unable to find, Domperidone POWD; 1 tablet twice daily., Disp: , Rfl:     Unable to find, Domperidone 10 mg once daily, Disp: , Rfl:     atorvastatin (LIPITOR) 10 MG tablet, Take 1 tablet by mouth Daily., Disp: 30 tablet, Rfl: 5    Past Medical History:   Diagnosis Date    Abdominal pain     Recurrent sx and follows with Dr. Heard.    Abscess of buttock     Acute gastritis     Allergic rhinitis     Alopecia     Angina pectoris     Anxiety     Arteriosclerotic coronary artery disease     Benign prostatic hypertrophy (BPH) with nocturia     Calf tenderness     Cervical neck pain with evidence of disc disease     CSA (central sleep apnea) 09/14/2015    Overnight polysomnogram.  Weight 205 pounds.  AHI severely abnormal at 58 events per hour.  Central sleep apnea index 34 events per hour.  Obstructive sleep apnea index 24 events per hour.  No sleep-related hypoxia.  Auto SV titration performed 11/2/2015.    DDD (degenerative disc disease), lumbar     Degeneration of cervical intervertebral disc     Depression     Esophageal reflux     Follows with Dr. Jackson    Fatigue     Fever 04/2015    Low grade symptoms of fever for 3 weeks.    Fibromyalgia     Gastroparesis     Health care maintenance     Heart palpitations     History of deep vein thrombophlebitis of lower extremity 2003    DVT Dx 2003    Hypokalemia     Hypothyroidism     Idiopathic peripheral neuropathy     Impaired fasting glucose     Insomnia     Lichen planus     tongue    LOM (loss of memory)     Mental status change     Episodes where he stares ahead and have some shaking of his limbs.  Possible seizure activity.   Referred to Dr. Santana, October 2, 2013.    Nausea     Onychomycosis of toenail     GAURANG (obstructive sleep apnea) 05/08/2007    Overnight polysomnogram.  Weight 247 pounds.  AHI mildly abnormal at 11 events per hour without sleep-related hypoxia noted.    Osteoarthritis of hip     PN (peripheral neuropathy)     Primary hypothyroidism     Rash     Tendonitis of left hip     Tendonitis of right hip     TIA (transient ischemic attack) 06/2016    Tinea versicolor        Past Surgical History:   Procedure Laterality Date    APPENDECTOMY      BACK SURGERY      Cervical    CARDIAC CATHETERIZATION N/A 10/05/2022    Procedure: Left Heart Cath;  Surgeon: Shanta Johnson MD;  Location:  [facility];  Service: Cardiology;  Laterality: N/A;    CARDIAC CATHETERIZATION N/A 10/05/2022    Procedure: Coronary angiography;  Surgeon: Shanta Johnson MD;  Location:  [facility];  Service: Cardiology;  Laterality: N/A;    CHOLECYSTECTOMY      COLONOSCOPY  12/2022    Normal.  Dr. Jackson    JOINT REPLACEMENT Bilateral     Hip    LUMBAR FUSION      SURAL NERVE/MUSCLE BIOPSY  04/2016    TONSILLECTOMY         Family History   Problem Relation Age of Onset    Heart failure Mother         Congestive    Heart disease Mother     Atrial fibrillation Mother     Alcohol abuse Paternal Uncle     Heart failure Paternal Grandmother         Congestive    No Known Problems Father     No Known Problems Sister     No Known Problems Brother     Heart failure Maternal Grandmother     Heart disease Maternal Grandmother     No Known Problems Maternal Grandfather     No Known Problems Paternal Grandfather        Social History     Tobacco Use    Smoking status: Never     Passive exposure: Never    Smokeless tobacco: Former     Types: Chew   Vaping Use    Vaping Use: Never used   Substance Use Topics    Alcohol use: No    Drug use: No         ECG 12 Lead    Date/Time: 8/11/2023 3:48 PM  Performed by: Shanta Johnson,  "MD  Authorized by: Shanta Johnson MD   Comparison: compared with previous ECG   Similar to previous ECG  Rhythm: sinus rhythm           Objective:     Visit Vitals  /70 (BP Location: Left arm, Patient Position: Sitting, Cuff Size: Adult)   Pulse 77   Ht 188 cm (74\")   Wt 104 kg (230 lb 3.2 oz)   SpO2 93%   BMI 29.56 kg/mý         Constitutional:       Appearance: Normal appearance. Well-developed.   HENT:      Head: Normocephalic and atraumatic.   Neck:      Vascular: No carotid bruit or JVD.   Pulmonary:      Effort: Pulmonary effort is normal.      Breath sounds: Normal breath sounds.   Cardiovascular:      Normal rate. Regular rhythm.      No gallop.    Pulses:     Radial: 2+ bilaterally.  Edema:     Peripheral edema absent.   Abdominal:      Palpations: Abdomen is soft.   Skin:     General: Skin is warm and dry.   Neurological:      Mental Status: Alert and oriented to person, place, and time.           Assessment:          Diagnosis Plan   1. Non-obstructive coronary artery disease               Plan:         1.  Nonobstructive coronary artery disease.  EKG unremarkable.  No evidence of progression.  On aspirin therapy.  Most recent lipid panel showed that his LDL was at 68.  However it still like him to be on a low-dose of statin if he will tolerate to help reduce the progression of his known coronary disease.  Discussed this at length with the patient.  I know in the past there has been some concern between his fibromyalgia and a remote history of pancreatitis and starting this medication.  The patient is willing to give the medication a try.  We will start on atorvastatin 10 mg daily.  If he has any issues with the medication I told him to stop it and let us now.  2.  Fibromyalgia  3.  Obstructive sleep apnea  4.  Depression/anxiety  5.  Hypothyroidism    We will plan on seeing the patient back again in 6 months.       "

## 2023-09-12 ENCOUNTER — OFFICE VISIT (OUTPATIENT)
Dept: INTERNAL MEDICINE | Facility: CLINIC | Age: 63
End: 2023-09-12
Payer: COMMERCIAL

## 2023-09-12 VITALS
BODY MASS INDEX: 29.3 KG/M2 | HEART RATE: 95 BPM | TEMPERATURE: 97.5 F | OXYGEN SATURATION: 98 % | HEIGHT: 74 IN | SYSTOLIC BLOOD PRESSURE: 110 MMHG | DIASTOLIC BLOOD PRESSURE: 66 MMHG | WEIGHT: 228.3 LBS

## 2023-09-12 DIAGNOSIS — Z00.00 HEALTH CARE MAINTENANCE: Primary | ICD-10-CM

## 2023-09-12 PROCEDURE — 99397 PER PM REEVAL EST PAT 65+ YR: CPT | Performed by: FAMILY MEDICINE

## 2023-09-12 NOTE — PROGRESS NOTES
Subjective   Joel Keating is a 63 y.o. male.     Chief Complaint   Patient presents with    Annual Exam     Fatigue; recently started atorvastain          History of Present Illness   Joel Keating 63 y.o. male who presents for an Annual Wellness Visit.  he has a history of   Patient Active Problem List   Diagnosis    Atopic rhinitis    Anxiety    Non-obstructive coronary artery disease    Benign prostatic hyperplasia with urinary obstruction    Tenderness of lower extremity    Degeneration of intervertebral disc of cervical region    Depression    Degeneration of intervertebral disc of lumbar region    Fatigue    Fibromyalgia    Gastroparesis    Herpes simplex virus (HSV) infection    Impaired fasting glucose    Insomnia    Memory loss    Onychomycosis of toenail    Osteoarthritis of hip    Peripheral neuropathy    Hypothyroidism    Obstructive sleep apnea, adult    Annual physical exam    Chronic pain syndrome    Renal insufficiency    Complex sleep apnea syndrome    Hypersomnia due to medical condition    CSA (central sleep apnea)    Health care maintenance    Circadian rhythm sleep disorder, delayed sleep phase type    Colon cancer screening    Lesion of breast    Chest pressure   .  he has been feeling poorly.   I  reviewed health maintenance with him as part of my preventative care plan.  Recommend regular dental and eye exams  Patient monitored by [facility]ology  The following portions of the patient's history were reviewed and updated as appropriate: allergies, current medications, past family history, past medical history, past social history, past surgical history, and problem list.    Review of Systems   Constitutional:  Positive for fatigue. Negative for unexpected weight change.   HENT:  Positive for postnasal drip. Negative for congestion.    Eyes:  Positive for itching. Negative for redness.   Respiratory: Negative.     Cardiovascular: Negative.    Gastrointestinal:   Positive for constipation.   Endocrine: Negative.    Genitourinary: Negative.    Musculoskeletal:  Positive for arthralgias, back pain, gait problem and myalgias.   Allergic/Immunologic: Positive for environmental allergies.   Neurological:  Positive for weakness, numbness and headaches.   Psychiatric/Behavioral:  Positive for dysphoric mood and sleep disturbance.      Objective   Physical Exam  Vitals and nursing note reviewed.   Constitutional:       Appearance: Normal appearance. He is well-developed. He is not diaphoretic.   HENT:      Head: Normocephalic and atraumatic.      Right Ear: Tympanic membrane, ear canal and external ear normal.      Left Ear: Tympanic membrane, ear canal and external ear normal.   Eyes:      General: Lids are normal. No scleral icterus.     Extraocular Movements: Extraocular movements intact.      Conjunctiva/sclera: Conjunctivae normal.   Neck:      Thyroid: No thyroid mass or thyromegaly.      Vascular: No carotid bruit or JVD.   Cardiovascular:      Rate and Rhythm: Normal rate and regular rhythm.      Pulses: Normal pulses.           Radial pulses are 2+ on the right side and 2+ on the left side.      Heart sounds: Normal heart sounds. No murmur heard.  Pulmonary:      Effort: Pulmonary effort is normal. No respiratory distress.      Breath sounds: Normal breath sounds.   Abdominal:      Palpations: Abdomen is soft.   Musculoskeletal:      Cervical back: Normal range of motion.      Right lower leg: No edema.      Left lower leg: No edema.   Skin:     General: Skin is warm and dry.      Coloration: Skin is not pale.      Findings: No erythema or rash.   Neurological:      General: No focal deficit present.      Mental Status: He is alert and oriented to person, place, and time.      Sensory: No sensory deficit.      Deep Tendon Reflexes: Reflexes are normal and symmetric.   Psychiatric:         Mood and Affect: Mood normal.         Behavior: Behavior normal. Behavior is  cooperative.         Thought Content: Thought content normal.         Judgment: Judgment normal.       Assessment & Plan   Diagnoses and all orders for this visit:    1. Health care maintenance (Primary)      Continue attempts at calorie appropriate diet and increasing regular physical activities  Education provided regarding prevention of serious illness with immunizations recommend pneumonia vaccine COVID-vaccine annual flu vaccine  Education provided regarding early detection of serious illness with screening  Follow-up otherwise as needed or preventatively annually

## 2023-10-30 DIAGNOSIS — E03.9 ACQUIRED HYPOTHYROIDISM: Primary | ICD-10-CM

## 2023-10-31 RX ORDER — LEVOTHYROXINE SODIUM 112 MCG
112 TABLET ORAL DAILY
Qty: 30 TABLET | Refills: 0 | Status: SHIPPED | OUTPATIENT
Start: 2023-10-31

## 2023-11-30 DIAGNOSIS — E03.9 ACQUIRED HYPOTHYROIDISM: ICD-10-CM

## 2023-11-30 RX ORDER — LEVOTHYROXINE SODIUM 112 MCG
112 TABLET ORAL DAILY
Qty: 30 TABLET | Refills: 0 | OUTPATIENT
Start: 2023-11-30

## 2023-12-30 DIAGNOSIS — E03.9 ACQUIRED HYPOTHYROIDISM: ICD-10-CM

## 2024-01-02 ENCOUNTER — TELEPHONE (OUTPATIENT)
Dept: ENDOCRINOLOGY | Age: 64
End: 2024-01-02

## 2024-01-02 DIAGNOSIS — E03.9 ACQUIRED HYPOTHYROIDISM: ICD-10-CM

## 2024-01-02 RX ORDER — LEVOTHYROXINE SODIUM 112 MCG
112 TABLET ORAL DAILY
Qty: 30 TABLET | Refills: 0 | OUTPATIENT
Start: 2024-01-02

## 2024-01-02 RX ORDER — LEVOTHYROXINE SODIUM 112 MCG
112 TABLET ORAL DAILY
Qty: 30 TABLET | Refills: 0 | Status: SHIPPED | OUTPATIENT
Start: 2024-01-02

## 2024-01-02 NOTE — TELEPHONE ENCOUNTER
Rx Refill Note  Requested Prescriptions     Pending Prescriptions Disp Refills    Synthroid 112 MCG tablet [Pharmacy Med Name: SYNTHROID 112 MCG TABLET] 30 tablet 0     Sig: TAKE 1 TABLET BY MOUTH DAILY      Last office visit with prescribing clinician: 1/17/2023   Last telemedicine visit with prescribing clinician: Visit date not found   Next office visit with prescribing clinician: Visit date not found                         Would you like a call back once the refill request has been completed: [] Yes [] No    If the office needs to give you a call back, can they leave a voicemail: [] Yes [] No    Gina Caldwell MA  01/02/24, 08:32 EST

## 2024-01-02 NOTE — TELEPHONE ENCOUNTER
Caller: Joel Keating    Relationship: Self    Best call back number: [phone]    Requested Prescriptions: Synthroid 112 MCG tablet   Requested Prescriptions      No prescriptions requested or ordered in this encounter        Pharmacy where request should be sent:  [facility]- [id number]    Last office visit with prescribing clinician: 1/17/2023   Last telemedicine visit with prescribing clinician: Visit date not found   Next office visit with prescribing clinician: Visit date not found     Additional details provided by patient: PT NEEDS A REFILL ON HIS MEDICATION SINCE HE IS COMPLETELY OUT OF IT.     Does the patient have less than a 3 day supply:  [x] Yes  [] No    Would you like a call back once the refill request has been completed: [x] Yes [] No    If the office needs to give you a call back, can they leave a voicemail: [x] Yes [] No    Albaro Suarez Rep   01/02/24 15:27 EST

## 2024-01-29 DIAGNOSIS — E03.9 ACQUIRED HYPOTHYROIDISM: ICD-10-CM

## 2024-01-29 RX ORDER — LEVOTHYROXINE SODIUM 112 MCG
112 TABLET ORAL DAILY
Qty: 30 TABLET | Refills: 0 | Status: SHIPPED | OUTPATIENT
Start: 2024-01-29

## 2024-01-29 NOTE — TELEPHONE ENCOUNTER
Rx Refill Note  Requested Prescriptions     Pending Prescriptions Disp Refills    Synthroid 112 MCG tablet [Pharmacy Med Name: SYNTHROID 112 MCG TABLET] 30 tablet 0     Sig: TAKE 1 TABLET BY MOUTH DAILY      Last office visit with prescribing clinician: 1/17/2023   Last telemedicine visit with prescribing clinician: Visit date not found   Next office visit with prescribing clinician: Visit date not found                         Would you like a call back once the refill request has been completed: [] Yes [] No    If the office needs to give you a call back, can they leave a voicemail: [] Yes [] No    Elyssa Caldwell  01/29/24, 08:20 EST

## 2024-01-31 ENCOUNTER — OFFICE VISIT (OUTPATIENT)
Dept: ENDOCRINOLOGY | Age: 64
End: 2024-01-31
Payer: COMMERCIAL

## 2024-01-31 VITALS
BODY MASS INDEX: 29.7 KG/M2 | DIASTOLIC BLOOD PRESSURE: 68 MMHG | HEIGHT: 74 IN | SYSTOLIC BLOOD PRESSURE: 118 MMHG | HEART RATE: 102 BPM | WEIGHT: 231.4 LBS | OXYGEN SATURATION: 98 %

## 2024-01-31 DIAGNOSIS — E78.2 MIXED HYPERLIPIDEMIA: ICD-10-CM

## 2024-01-31 DIAGNOSIS — E03.9 ACQUIRED HYPOTHYROIDISM: Primary | ICD-10-CM

## 2024-01-31 DIAGNOSIS — R73.03 PRE-DIABETES: ICD-10-CM

## 2024-01-31 PROCEDURE — 99214 OFFICE O/P EST MOD 30 MIN: CPT | Performed by: NURSE PRACTITIONER

## 2024-01-31 NOTE — PROGRESS NOTES
"Chief Complaint  Acquired hypothyroidism    Subjective        Joel Keating presents to [facility]  History of Present Illness    LOV 1/2023 with Dr. Trevizo     Patient is a 63-year-old male who came in for follow-up.  He is new to myself     Primary Hypothyroidism   Synthroid 112 mcg/day- takes with other medications in the morning   no history of goiter or head/neck radiation therapy.     Prediabetes: not on medications.    maternal grandmother had diabetes mellitus     Hyperlipidemia: cardiology started on statin     PMH s/f: sleep apnea, chronic pain/fibromyalgias: on methadone prescribed by Dr. Back and gabapentin prescribed by Dr. Hawkins    He reports feeling bad all the time, had a shot of testosterone 10-12 years ago which made him feel so much better. Follows with urology as well        Objective   Vital Signs:  /68 (BP Location: Left arm, Patient Position: Sitting)   Pulse 102   Ht 188 cm (74.02\")   Wt 105 kg (231 lb 6.4 oz)   SpO2 98%   BMI 29.70 kg/m²   Estimated body mass index is 29.7 kg/m² as calculated from the following:    Height as of this encounter: 188 cm (74.02\").    Weight as of this encounter: 105 kg (231 lb 6.4 oz).             Physical Exam  Vitals reviewed.   Constitutional:       General: He is not in acute distress.  HENT:      Head: Normocephalic and atraumatic.   Cardiovascular:      Rate and Rhythm: Normal rate.   Pulmonary:      Effort: Pulmonary effort is normal. No respiratory distress.   Musculoskeletal:         General: No signs of injury. Normal range of motion.      Cervical back: Normal range of motion and neck supple.   Skin:     General: Skin is warm and dry.   Neurological:      Mental Status: He is alert and oriented to person, place, and time. Mental status is at baseline.   Psychiatric:         Mood and Affect: Mood normal.         Behavior: Behavior normal.         Thought Content: Thought content normal.         Judgment: " Judgment normal.            Result Review :    The following data was reviewed by: PEPPER Webber on 01/31/2024:  Common labs          6/29/2023    15:49   Common Labs   Glucose 134    BUN 14    Creatinine 1.43    Sodium 141    Potassium 4.0    Chloride 104    Calcium 9.2    Albumin 4.4    Total Bilirubin 0.6    Alkaline Phosphatase 95    AST (SGOT) 16    ALT (SGPT) 15    WBC 3.50    Hemoglobin 14.9    Hematocrit 43.1    Platelets 173                   Assessment and Plan     Diagnoses and all orders for this visit:    1. Acquired hypothyroidism (Primary)  -     TSH  -     T4, Free  -     Hemoglobin A1c  -     Basic Metabolic Panel  -     Lipid Panel  -     TestT+TestF+SHBG    2. Pre-diabetes  -     TSH  -     T4, Free  -     Hemoglobin A1c  -     Basic Metabolic Panel  -     Lipid Panel  -     TestT+TestF+SHBG    3. Mixed hyperlipidemia  -     TSH  -     T4, Free  -     Hemoglobin A1c  -     Basic Metabolic Panel  -     Lipid Panel  -     TestT+TestF+SHBG             Follow Up     Return in about 1 year (around 1/31/2025).    Labs today  Further recommendations to follow    Patient was given instructions and counseling regarding his condition or for health maintenance advice. Please see specific information pulled into the AVS if appropriate.       PEPPER Webber

## 2024-02-08 LAB
BUN SERPL-MCNC: 16 MG/DL (ref 8–23)
BUN/CREAT SERPL: 11.5 (ref 7–25)
CALCIUM SERPL-MCNC: 10 MG/DL (ref 8.6–10.5)
CHLORIDE SERPL-SCNC: 103 MMOL/L (ref 98–107)
CHOLEST SERPL-MCNC: 152 MG/DL (ref 0–200)
CO2 SERPL-SCNC: 26.9 MMOL/L (ref 22–29)
CREAT SERPL-MCNC: 1.39 MG/DL (ref 0.76–1.27)
EGFRCR SERPLBLD CKD-EPI 2021: 56.3 ML/MIN/1.73
GLUCOSE SERPL-MCNC: 118 MG/DL (ref 65–99)
HBA1C MFR BLD: 5.9 % (ref 4.8–5.6)
HDLC SERPL-MCNC: 45 MG/DL (ref 40–60)
IMP & REVIEW OF LAB RESULTS: NORMAL
LDLC SERPL CALC-MCNC: 73 MG/DL (ref 0–100)
POTASSIUM SERPL-SCNC: 4.1 MMOL/L (ref 3.5–5.2)
SHBG SERPL-SCNC: 80.1 NMOL/L (ref 19.3–76.4)
SODIUM SERPL-SCNC: 142 MMOL/L (ref 136–145)
T4 FREE SERPL-MCNC: 1.38 NG/DL (ref 0.93–1.7)
TESTOST FREE SERPL-MCNC: 0.9 PG/ML (ref 6.6–18.1)
TESTOST SERPL-MCNC: 353 NG/DL (ref 264–916)
TRIGL SERPL-MCNC: 207 MG/DL (ref 0–150)
TSH SERPL DL<=0.005 MIU/L-ACNC: 0.23 UIU/ML (ref 0.27–4.2)
VLDLC SERPL CALC-MCNC: 34 MG/DL (ref 5–40)

## 2024-02-13 ENCOUNTER — TELEPHONE (OUTPATIENT)
Dept: ENDOCRINOLOGY | Age: 64
End: 2024-02-13
Payer: COMMERCIAL

## 2024-02-19 NOTE — PROGRESS NOTES
Subjective:     Encounter Date:02/20/2024      Patient ID: Joel Keating is a 63 y.o. male.    Chief Complaint:  History of Present Illness    This is a 62-year-old with nonobstructive coronary artery disease, fibromyalgia, anxiety, hypothyroidism, who presents for follow-up.      An office visit with me in 9/2022 he reported symptoms of worsening fatigue and ongoing episodes of chest pressure.  Recommended proceeding with a stress test and an echocardiogram.  He underwent testing on 10/5/2022.  His echocardiogram was unremarkable except for mildly elevated dilated aorta.  During the stress portion of the stress test he received Lexiscan infusion and began developing chest pressure radiating to his left arm and jaw.  He was given aminophylline to reverse his symptoms but they continued and actually developed a brief syncopal episode per the staff.  He was sent to the emergency room where he appeared to be confused.  Following his arrival to the emergency room his troponins were unremarkable and EKG unchanged.  Confusion resolved.  Chest pressure continued despite administration of nitroglycerin, IV fentanyl, and IV morphine.  Based on his ongoing symptoms I recommended proceeding with cardiac catheterization which was performed the same day.  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  He was observed overnight.  A D-dimer was checked and was normal.  He was discharged home the following day.     He was tried on isosorbide mononitrate but developed a significant headache.  He saw PEPPER Neal in follow-up on 10/19/2022.  He continued to complain of shortness of breath and dizziness.  He was still having episodes of chest pressure although this was little bit better.   Calcium channel blocker was offered due to the finding of a myocardial bridge but the patient wanted to hold off.  He was referred to pulmonary regarding his shortness of breath.     I saw the patient back in 8/2023.  To that visit the patient indicated he was evaluated by pulmonary who recommended proceeding with high-resolution CT scan to evaluate for fibrosis.  They also discussed repeating a sleep study since he had a history of moderate to severe sleep apnea but did not tolerate CPAP in the past.  Patient indicated he lost 40 pounds since then and did not want to pursue another sleep study.  In light of his history of nonobstructive coronary artery disease I recommended the patient's start a low-dose of atorvastatin 10 mg daily.    He presents today for routine follow-up.  He reports ongoing issues with fatigue that he does feel like is worse.  He believes that the atorvastatin may be contributing to this.  He reports ongoing shortness of breath with some worsening.  He denies any chest pain, palpitations, orthopnea, near-syncope or syncope or lower extremity swelling.     Prior History:  Patient was previously followed by Dr. Ventura.  He began seeing Dr. Ventura in 2014 when he presented to the hospital with symptoms of unstable angina.  He underwent a cardiac catheterization at that time that showed mild nonobstructive coronary artery disease and small myocardial bridge of the LAD.  Since then he has followed up with Dr. Ventura on a yearly basis.  He was last seen by Dr. Ventura in 7/2017 at which time he appeared to be stable.  He was last seen in the office by ELIAS Huynh and 8/2019 at which time he appeared to be stable from a cardiac standpoint.     Exam initially in 9/2020 when he presented for follow-up.  He reported at that office visit that he had began seeing Dr. Mckenna with infectious disease in 12/2019 for daily mild fevers.  His work-up included an echocardiogram which was performed on 3/2020 which  showed normal left ventricular systolic function, EF of 60%, grade 1 diastolic dysfunction, no significant valvular disease.  At the time of his office visit he reported episodes of daily chest pressure radiating to his jaw and left shoulder and associated with diaphoresis.  Symptoms do not extremely occur with exertion.  He also reported an episode of syncope while raising from a seated position.  Their office visit recommended proceeding with further work-up with a stress test and a ZIO monitor.  ZIO monitor was unremarkable.  Her stress test showed no evidence of ischemia.     Patient was not seen back in follow-up until 10/2021 when he was evaluated by JULIANN Veloz  Who presented to the office at that time with complaints of a syncopal episode without any preceding symptoms.  His ZIO monitor and echocardiogram were recommended at that time.  His ZIO monitor was unremarkable.  His echocardiogram continues showed normal left ventricular systolic function wall motion with EF of 58%, grade 1 diastolic dysfunction, no significant valvular disease, and borderline dilatation of his aorta with an aortic root of 4 cm.  He also had lab work performed at that office visit including a normal D-dimer.    Review of Systems   Constitutional: Positive for malaise/fatigue.   HENT:  Negative for hearing loss, hoarse voice, nosebleeds and sore throat.    Eyes:  Negative for pain.   Cardiovascular:  Positive for dyspnea on exertion. Negative for chest pain, claudication, cyanosis, irregular heartbeat, leg swelling, near-syncope, orthopnea, palpitations, paroxysmal nocturnal dyspnea and syncope.   Respiratory:  Negative for shortness of breath and snoring.    Endocrine: Negative for cold intolerance, heat intolerance, polydipsia, polyphagia and polyuria.   Skin:  Negative for itching and rash.   Musculoskeletal:  Positive for joint pain. Negative for arthritis, falls, joint swelling, muscle cramps, muscle weakness and  myalgias.   Gastrointestinal:  Negative for constipation, diarrhea, dysphagia, heartburn, hematemesis, hematochezia, melena, nausea and vomiting.   Genitourinary:  Negative for frequency, hematuria and hesitancy.   Neurological:  Negative for excessive daytime sleepiness, dizziness, headaches, light-headedness, numbness and weakness.   Psychiatric/Behavioral:  Negative for depression. The patient is not nervous/anxious.          Current Outpatient Medications:     ALLERGY SERUM SUBLINGUAL DROPS, Place  under the tongue Daily., Disp: , Rfl:     amitriptyline (ELAVIL) 10 MG tablet, Take 1 tablet by mouth Every Night., Disp: , Rfl:     aspirin 81 MG EC tablet, Take 1 tablet by mouth Daily., Disp: , Rfl:     atorvastatin (LIPITOR) 10 MG tablet, Take 1 tablet by mouth Daily., Disp: 30 tablet, Rfl: 5    coenzyme Q10 100 MG capsule, Take 1 capsule by mouth Daily., Disp: , Rfl:     EPINEPHrine (EPIPEN) 0.3 MG/0.3ML solution auto-injector injection, , Disp: , Rfl:     escitalopram (LEXAPRO) 20 MG tablet, Take 1 tablet by mouth Daily., Disp: , Rfl:     famotidine (PEPCID) 20 MG tablet, Take 1 tablet by mouth 2 (Two) Times a Day., Disp: , Rfl:     fluticasone (FLONASE) 50 MCG/ACT nasal spray, 1 spray into each nostril 2 (two) times a day. Administer 2 sprays in each nostril for each dose. (Patient taking differently: 2 sprays into the nostril(s) as directed by provider Daily. Administer 2 sprays in each nostril for each dose.), Disp: 3 each, Rfl: 3    gabapentin (NEURONTIN) 600 MG tablet, TAKE 2 TABLETS BY MOUTH  TWICE A DAY. MAXIMUM DAILY  AMOUNT: 2400MG, Disp: , Rfl:     melatonin 5 MG tablet tablet, Take 2 tablets by mouth Every Night., Disp: , Rfl:     methadone (DOLOPHINE) 5 MG tablet, Take 1 tablet by mouth 2 (Two) Times a Day., Disp: , Rfl:     Multiple Vitamins-Minerals (MULTI COMPLETE PO), Take  by mouth., Disp: , Rfl:     Narcan 4 MG/0.1ML nasal spray, , Disp: , Rfl:     ondansetron (ZOFRAN) 4 MG tablet, Take 1  tablet by mouth Every 6 (Six) Hours As Needed for Nausea or Vomiting. Take for nausea and vomiting., Disp: , Rfl:     Probiotic Product (PROBIOTIC-10 PO), Take  by mouth., Disp: , Rfl:     Sennosides (SENNA) 8.6 MG capsule, Take 1 capsule by mouth Daily., Disp: , Rfl:     Synthroid 112 MCG tablet, Take 1 tablet by mouth Daily. Must keep appointment on January 31, 2024., Disp: 30 tablet, Rfl: 0    tamsulosin (FLOMAX) 0.4 MG capsule 24 hr capsule, Take 1 capsule by mouth Daily., Disp: , Rfl:     terbinafine (lamiSIL) 250 MG tablet, , Disp: , Rfl:     tiZANidine (ZANAFLEX) 4 MG tablet, Take 1 tablet by mouth Every 6 (Six) Hours As Needed., Disp: , Rfl:     Unable to find, Domperidone POWD; 1 tablet twice daily., Disp: , Rfl:     Unable to find, Domperidone 10 mg once daily, Disp: , Rfl:     Past Medical History:   Diagnosis Date    Abdominal pain     Recurrent sx and follows with Dr. Heard.    Abscess of buttock     Acute gastritis     Allergic rhinitis     Alopecia     Angina pectoris     Anxiety     Arteriosclerotic coronary artery disease     Benign prostatic hypertrophy (BPH) with nocturia     Calf tenderness     Cervical neck pain with evidence of disc disease     CSA (central sleep apnea) 09/14/2015    Overnight polysomnogram.  Weight 205 pounds.  AHI severely abnormal at 58 events per hour.  Central sleep apnea index 34 events per hour.  Obstructive sleep apnea index 24 events per hour.  No sleep-related hypoxia.  Auto SV titration performed 11/2/2015.    DDD (degenerative disc disease), lumbar     Degeneration of cervical intervertebral disc     Depression     Esophageal reflux     Follows with Dr. Jackson    Fatigue     Fever 04/2015    Low grade symptoms of fever for 3 weeks.    Fibromyalgia     Gastroparesis     Health care maintenance     Heart palpitations     History of deep vein thrombophlebitis of lower extremity 2003    DVT Dx 2003    Hypokalemia     Hypothyroidism     Idiopathic peripheral  neuropathy     Impaired fasting glucose     Insomnia     Lichen planus     tongue    LOM (loss of memory)     Mental status change     Episodes where he stares ahead and have some shaking of his limbs.  Possible seizure activity.  Referred to Dr. Santana, October 2, 2013.    Nausea     Onychomycosis of toenail     GAURANG (obstructive sleep apnea) 05/08/2007    Overnight polysomnogram.  Weight 247 pounds.  AHI mildly abnormal at 11 events per hour without sleep-related hypoxia noted.    Osteoarthritis of hip     PN (peripheral neuropathy)     Primary hypothyroidism     Rash     Tendonitis of left hip     Tendonitis of right hip     TIA (transient ischemic attack) 06/2016    Tinea versicolor        Past Surgical History:   Procedure Laterality Date    APPENDECTOMY      BACK SURGERY      Cervical    CARDIAC CATHETERIZATION N/A 10/05/2022    Procedure: Left Heart Cath;  Surgeon: Shanta Johnson MD;  Location:  [facility];  Service: Cardiology;  Laterality: N/A;    CARDIAC CATHETERIZATION N/A 10/05/2022    Procedure: Coronary angiography;  Surgeon: Shanta Johnson MD;  Location:  [facility];  Service: Cardiology;  Laterality: N/A;    CHOLECYSTECTOMY      COLONOSCOPY  12/2022    Normal.  Dr. Jackson    JOINT REPLACEMENT Bilateral     Hip    LUMBAR FUSION      SURAL NERVE/MUSCLE BIOPSY  04/2016    TONSILLECTOMY         Family History   Problem Relation Age of Onset    Heart failure Mother         Congestive    Heart disease Mother     Atrial fibrillation Mother     Alcohol abuse Paternal Uncle     Heart failure Paternal Grandmother         Congestive    No Known Problems Father     No Known Problems Sister     No Known Problems Brother     Heart failure Maternal Grandmother     Heart disease Maternal Grandmother     No Known Problems Maternal Grandfather     No Known Problems Paternal Grandfather        Social History     Tobacco Use    Smoking status: Never     Passive exposure: Never     "Smokeless tobacco: Never   Vaping Use    Vaping Use: Never used   Substance Use Topics    Alcohol use: No    Drug use: No         ECG 12 Lead    Date/Time: 2/20/2024 12:01 PM  Performed by: Shanta Johnson MD    Authorized by: Shanta Johnson MD  Comparison: compared with previous ECG   Similar to previous ECG  Rhythm: sinus rhythm             Objective:     Visit Vitals  /70 (BP Location: Left arm, Patient Position: Sitting, Cuff Size: Adult)   Ht 188 cm (74\")   Wt 107 kg (235 lb 6.4 oz)   SpO2 91%   BMI 30.22 kg/m²         Constitutional:       Appearance: Normal appearance. Well-developed.   HENT:      Head: Normocephalic and atraumatic.   Neck:      Vascular: No carotid bruit or JVD.   Pulmonary:      Effort: Pulmonary effort is normal.      Breath sounds: Normal breath sounds.   Cardiovascular:      Normal rate. Regular rhythm.      No gallop.    Pulses:     Radial: 2+ bilaterally.  Edema:     Peripheral edema absent.   Abdominal:      Palpations: Abdomen is soft.   Skin:     General: Skin is warm and dry.   Neurological:      Mental Status: Alert and oriented to person, place, and time.           Assessment:          Diagnosis Plan   1. Non-obstructive coronary artery disease        2. Acquired hypothyroidism        3. Complex sleep apnea syndrome        4. Obstructive sleep apnea, adult               Plan:       1.  Nonobstructive coronary artery disease.  No symptoms concerning for angina at this time.  EKG is unchanged.  Continue current medical management including aspirin.  Due to his worsening fatigue which she is concerned may be related to the atorvastatin I did offer to have him hold the atorvastatin for a couple weeks to see if his symptoms improve.  The patient does not feel that this is necessary and is willing to continue with the atorvastatin since this will help stabilize his nonobstructive coronary artery disease.  2.  Fatigue/dyspnea on exertion.  Chronic symptoms.  Suspect this is " multifactorial.  3.  Fibromyalgia  4.  Obstructive sleep apnea.  Patient did not tolerate CPAP in the past and is not interested in pursuing another sleep study.  5.  Depression/anxiety  6.  Hypothyroidism    Will plan on seeing the patient back again in 6 months.

## 2024-02-20 ENCOUNTER — OFFICE VISIT (OUTPATIENT)
Age: 64
End: 2024-02-20
Payer: COMMERCIAL

## 2024-02-20 VITALS
HEIGHT: 74 IN | BODY MASS INDEX: 30.21 KG/M2 | SYSTOLIC BLOOD PRESSURE: 125 MMHG | OXYGEN SATURATION: 91 % | WEIGHT: 235.4 LBS | DIASTOLIC BLOOD PRESSURE: 70 MMHG

## 2024-02-20 DIAGNOSIS — G47.33 OBSTRUCTIVE SLEEP APNEA, ADULT: ICD-10-CM

## 2024-02-20 DIAGNOSIS — E03.9 ACQUIRED HYPOTHYROIDISM: ICD-10-CM

## 2024-02-20 DIAGNOSIS — G47.31 COMPLEX SLEEP APNEA SYNDROME: ICD-10-CM

## 2024-02-20 DIAGNOSIS — I25.10 CHRONIC CORONARY ARTERY DISEASE: Primary | ICD-10-CM

## 2024-02-20 PROCEDURE — 99214 OFFICE O/P EST MOD 30 MIN: CPT | Performed by: INTERNAL MEDICINE

## 2024-02-20 PROCEDURE — 93000 ELECTROCARDIOGRAM COMPLETE: CPT | Performed by: INTERNAL MEDICINE

## 2024-02-20 NOTE — LETTER
February 20, 2024       No Recipients    Patient: Joel Keating   YOB: 1960   Date of Visit: 2/20/2024       Dear Cesar Hoover MD,    Joel Keating was in my office today. Below are the relevant portions of my assessment and plan of care.           If you have questions, please do not hesitate to call me. I look forward to following Joel along with you.         Sincerely,        Shanta Johnson MD        CC:   No Recipients

## 2024-02-28 DIAGNOSIS — E03.9 ACQUIRED HYPOTHYROIDISM: ICD-10-CM

## 2024-02-28 RX ORDER — LEVOTHYROXINE SODIUM 112 MCG
112 TABLET ORAL DAILY
Qty: 90 TABLET | Refills: 1 | Status: SHIPPED | OUTPATIENT
Start: 2024-02-28

## 2024-02-28 NOTE — TELEPHONE ENCOUNTER
Rx Refill Note  Requested Prescriptions     Pending Prescriptions Disp Refills    Synthroid 112 MCG tablet [Pharmacy Med Name: SYNTHROID 112 MCG TABLET] 30 tablet 0     Sig: TAKE 1 TABLET BY MOUTH DAILY      Last office visit with prescribing clinician: 1/17/2023   Last telemedicine visit with prescribing clinician: Visit date not found   Next office visit with prescribing clinician: 1/31/2025                         Would you like a call back once the refill request has been completed: [] Yes [] No    If the office needs to give you a call back, can they leave a voicemail: [] Yes [] No    Gina Caldwell MA  02/28/24, 07:52 EST

## 2024-03-04 RX ORDER — ATORVASTATIN CALCIUM 10 MG/1
10 TABLET, FILM COATED ORAL DAILY
Qty: 30 TABLET | Refills: 5 | Status: SHIPPED | OUTPATIENT
Start: 2024-03-04

## 2024-04-17 ENCOUNTER — OFFICE VISIT (OUTPATIENT)
Dept: SLEEP MEDICINE | Facility: HOSPITAL | Age: 64
End: 2024-04-17
Payer: COMMERCIAL

## 2024-04-17 VITALS — HEIGHT: 74 IN | WEIGHT: 234 LBS | OXYGEN SATURATION: 95 % | BODY MASS INDEX: 30.03 KG/M2 | HEART RATE: 104 BPM

## 2024-04-17 DIAGNOSIS — G47.31 COMPLEX SLEEP APNEA SYNDROME: Primary | ICD-10-CM

## 2024-04-17 DIAGNOSIS — G47.14 HYPERSOMNIA DUE TO MEDICAL CONDITION: ICD-10-CM

## 2024-04-17 DIAGNOSIS — G47.33 OBSTRUCTIVE SLEEP APNEA, ADULT: ICD-10-CM

## 2024-04-17 PROCEDURE — G0463 HOSPITAL OUTPT CLINIC VISIT: HCPCS

## 2024-04-17 NOTE — PROGRESS NOTES
Follow Up [facility] Note     Chief Complaint:  GAURANG     Primary Care Physician: Cesar Hoover MD    Interval History:   The patient is a 63 y.o. male  who I last saw 3/12/2021 and that note was reviewed.  The patient reports he is constantly bed.  He is tired when he wakes up.  He takes several naps daily.  Falls asleep early evening and then he gets up and goes to bed.  Due to the recall, the patient has not been using his BiPAP auto SV.  Previously, the patient tried a dental device and.  He is waiting for his replacement device.    The patient goes to bed between 11 PM and 2 AM and gets out of bed between 11 AM and 2 PM.  He is tired upon arising.  He has complaints of hypersomnolence.  He will take 3 naps daily.  However, [place] Sleepiness Scale is 6.  He has a dry mouth in the morning.  He reports leg jerking.  He has nocturnal pain that causes him.  He has problems falling asleep and difficulty staying asleep with frequent awakenings and will use the restroom twice during the    Review of Systems:    A complete review of systems was done and all were negative with the exception of difficulty in urination, postnasal drip, neck pain, fatigue, shortness of breath with exertion, some fainting and dizziness, abdominal bloating and depression    Social History:    Social History     Socioeconomic History    Marital status:     Number of children: 2   Tobacco Use    Smoking status: Never     Passive exposure: Never    Smokeless tobacco: Never   Vaping Use    Vaping status: Never Used   Substance and Sexual Activity    Alcohol use: No    Drug use: No    Sexual activity: Defer       Allergies:  Butorphanol, Hydroxyzine, Hydroxyzine hcl, Iodinated contrast media, Iodine, Other, Phenytoin, Amoxicillin, and Clindamycin/lincomycin     Medication Review: His list was reviewed.  He takes methadone.  Amitriptyline at night.  He also takes gabapentin.  Jorge reviewed.    Vital Signs:    Vitals:     "04/17/24 0900   Pulse: 104   SpO2: 95%   Weight: 106 kg (234 lb)   Height: 188 cm (74.02\")     Body mass index is 30.03 kg/m².    Physical Exam:    Constitutional:  Well developed 63 y.o. male that appears in no apparent distress.  Awake & oriented times 3.  Normal mood with normal recent and remote memory and normal judgement.  Eyes:  Conjunctivae normal.  Oropharynx: Previously, moist mucous membranes without exudate and a large tongue and borderline enlarged uvula and class III Mallampati airway.    Self-administered [place] Sleepiness Scale test results: 6, previously 7 and 2021  0-5 Lower normal daytime sleepiness  6-10 Higher normal daytime sleepiness  11-12 Mild, 13-15 Moderate, & 16-24 Severe excessive daytime sleepiness     Impression:   Obstructive sleep apnea with complex sleep apnea previously treated with BiPAP auto SV. The patient presents with symptoms and signs consistent with his known history of obstructive sleep apnea with complaints of hypersomnolence.  Circadian rhythm sleep disoder, delayed sleep phase type    Plan:  Good sleep hygiene measures should be maintained.  Weight loss would be beneficial in this patient who is obese by Body mass index is 30.03 kg/m².      I would recommend that the patient be treated with positive airway pressure therapy.  Potential side effects of not using PAP therapy reviewed and addressed.  In 2021, repeat AHT to reevaluate GAURANG/CSA recommended.  That test was never done.  Again, follow-up for retesting.  Alternatively, when the patient receives a new device from [employer], he is to call me to make sure the device is set up correctly.    Alternatively, retesting could be performed and a new device that is not a Robert device could be obtained.    I answered all of the patient's questions.  The patient will call the [facility] for any problems and will follow up once he gets his new device.        Rajan Tamez MD  Sleep Medicine  04/17/24  10:25 " EDT

## 2024-04-22 ENCOUNTER — TELEPHONE (OUTPATIENT)
Dept: SLEEP MEDICINE | Facility: HOSPITAL | Age: 64
End: 2024-04-22
Payer: COMMERCIAL

## 2024-04-22 NOTE — TELEPHONE ENCOUNTER
Called PT has not received his new CPAP yet from [employer] he will call as soon as he get his new CPAP.

## 2024-05-21 ENCOUNTER — OFFICE VISIT (OUTPATIENT)
Dept: INTERNAL MEDICINE | Facility: CLINIC | Age: 64
End: 2024-05-21
Payer: COMMERCIAL

## 2024-05-21 VITALS
HEART RATE: 97 BPM | SYSTOLIC BLOOD PRESSURE: 112 MMHG | TEMPERATURE: 96.1 F | OXYGEN SATURATION: 92 % | DIASTOLIC BLOOD PRESSURE: 60 MMHG | BODY MASS INDEX: 29.76 KG/M2 | HEIGHT: 74 IN | WEIGHT: 231.9 LBS

## 2024-05-21 DIAGNOSIS — E03.9 ACQUIRED HYPOTHYROIDISM: ICD-10-CM

## 2024-05-21 DIAGNOSIS — I25.10 CHRONIC CORONARY ARTERY DISEASE: Primary | ICD-10-CM

## 2024-05-21 DIAGNOSIS — M79.7 FIBROMYALGIA: ICD-10-CM

## 2024-05-21 DIAGNOSIS — G89.4 CHRONIC PAIN SYNDROME: ICD-10-CM

## 2024-05-21 PROCEDURE — 99214 OFFICE O/P EST MOD 30 MIN: CPT | Performed by: FAMILY MEDICINE

## 2024-05-21 NOTE — PROGRESS NOTES
"Chief Complaint  WORK PAPER    Subjective        Joel Keating presents to [facility]  History of Present Illness  Patient office appointment to discuss chronic fibromyalgia hyperlipidemia coronary disease chronic pain management with narcotic pain medication  Patient presently feels stable he needs forms filled out for his continued disability  Objective   Vital Signs:  /60 (BP Location: Right arm, Patient Position: Sitting, Cuff Size: Adult)   Pulse 97   Temp 96.1 °F (35.6 °C) (Tympanic)   Ht 188 cm (74.02\")   Wt 105 kg (231 lb 14.4 oz)   SpO2 92%   BMI 29.76 kg/m²   Estimated body mass index is 29.76 kg/m² as calculated from the following:    Height as of this encounter: 188 cm (74.02\").    Weight as of this encounter: 105 kg (231 lb 14.4 oz).             Physical Exam  Vitals and nursing note reviewed.   HENT:      Head: Normocephalic and atraumatic.   Eyes:      General: No scleral icterus.     Extraocular Movements: Extraocular movements intact.      Pupils: Pupils are equal, round, and reactive to light.   Neck:      Vascular: No carotid bruit.   Cardiovascular:      Rate and Rhythm: Normal rate.   Pulmonary:      Effort: Pulmonary effort is normal.   Musculoskeletal:      Comments: Trigger points scapula as well as lower extremity   Skin:     General: Skin is warm and dry.   Neurological:      Mental Status: He is alert. Mental status is at baseline.      Motor: Weakness present.      Coordination: Coordination abnormal.      Gait: Gait abnormal.   Psychiatric:         Mood and Affect: Mood normal.         Behavior: Behavior normal.         Thought Content: Thought content normal.         Judgment: Judgment normal.        Result Review :                     Assessment and Plan     Diagnoses and all orders for this visit:    1. Non-obstructive coronary artery disease (Primary)    2. Acquired hypothyroidism    3. Chronic pain syndrome    4. Fibromyalgia    Patient " receives most of his care at outside this office through subspecialists  Recommend shingles vaccine pneumonia vaccine     I spent 30 minutes caring for Joel on this date of service. This time includes time spent by me in the following activities:preparing for the visit, reviewing tests, performing a medically appropriate examination and/or evaluation , counseling and educating the patient/family/caregiver, documenting information in the medical record, and independently interpreting results and communicating that information with the patient/family/caregiver  Follow Up     Return if symptoms worsen or fail to improve, for Recheck.  Patient was given instructions and counseling regarding his condition or for health maintenance advice. Please see specific information pulled into the AVS if appropriate.

## 2024-07-03 ENCOUNTER — OFFICE VISIT (OUTPATIENT)
Dept: INTERNAL MEDICINE | Facility: CLINIC | Age: 64
End: 2024-07-03
Payer: COMMERCIAL

## 2024-07-03 VITALS
RESPIRATION RATE: 16 BRPM | HEIGHT: 74 IN | TEMPERATURE: 97.2 F | SYSTOLIC BLOOD PRESSURE: 116 MMHG | OXYGEN SATURATION: 94 % | BODY MASS INDEX: 29.26 KG/M2 | HEART RATE: 86 BPM | WEIGHT: 228 LBS | DIASTOLIC BLOOD PRESSURE: 72 MMHG

## 2024-07-03 DIAGNOSIS — E03.9 ACQUIRED HYPOTHYROIDISM: Primary | ICD-10-CM

## 2024-07-03 DIAGNOSIS — R53.1 WEAKNESS: ICD-10-CM

## 2024-07-03 DIAGNOSIS — R53.82 CHRONIC FATIGUE: ICD-10-CM

## 2024-07-03 DIAGNOSIS — G89.4 CHRONIC PAIN SYNDROME: ICD-10-CM

## 2024-07-03 PROCEDURE — 99214 OFFICE O/P EST MOD 30 MIN: CPT | Performed by: FAMILY MEDICINE

## 2024-07-03 NOTE — PROGRESS NOTES
"Chief Complaint  Fatigue (Has been in bed for at least 10 days , don't feel like doing anything)    Subjective        Joel Keating presents to [facility]  Fatigue  Associated symptoms include fatigue.     Patient appointment to address acute fatigue and weakness he feels this is been developing over the last few months has had consultation with his endocrinologist as reviewed no changes medication he has not had a recent CBC have any abdominal pain he just feels weak has been no significant change in his chronic narcotic pain management  No fever sweats or chills no known exposures no specific muscle aches or pains worse than usual baseline  No rashes some travel to [place] drove in a car but most of his symptoms preceded that visit  Objective   Vital Signs:  /72   Pulse 86   Temp 97.2 °F (36.2 °C) (Temporal)   Resp 16   Ht 188 cm (74.02\")   Wt 103 kg (228 lb)   SpO2 94%   BMI 29.26 kg/m²   Estimated body mass index is 29.26 kg/m² as calculated from the following:    Height as of this encounter: 188 cm (74.02\").    Weight as of this encounter: 103 kg (228 lb).             Physical Exam  Vitals and nursing note reviewed.   Constitutional:       Appearance: He is ill-appearing. He is not diaphoretic.   HENT:      Right Ear: Ear canal and external ear normal.      Ears:      Comments: Left erythematous canal  Right TM fluid level     Nose: Congestion present.      Mouth/Throat:      Mouth: Mucous membranes are moist.      Pharynx: No posterior oropharyngeal erythema.   Eyes:      General: No scleral icterus.     Extraocular Movements: Extraocular movements intact.      Pupils: Pupils are equal, round, and reactive to light.   Cardiovascular:      Rate and Rhythm: Normal rate and regular rhythm.      Pulses: Normal pulses.      Heart sounds: Normal heart sounds.   Pulmonary:      Effort: Pulmonary effort is normal.      Breath sounds: Normal breath sounds.   Abdominal:      " Tenderness: There is no right CVA tenderness or left CVA tenderness.   Musculoskeletal:      Right lower leg: No edema.      Left lower leg: No edema.   Lymphadenopathy:      Cervical: No cervical adenopathy.   Skin:     General: Skin is warm and dry.   Neurological:      Mental Status: He is alert.      Motor: Weakness present.      Coordination: Coordination abnormal.      Gait: Gait abnormal.        Result Review :      Common labs          1/31/2024    14:14 6/26/2024    11:20   Common Labs   Glucose 118     BUN 16     Creatinine 1.39     Sodium 142     Potassium 4.1     Chloride 103     Calcium 10.0     Total Cholesterol 152     Triglycerides 207     HDL Cholesterol 45     LDL Cholesterol  73     Hemoglobin A1C 5.90  5.9          Details          This result is from an external source.                          Assessment and Plan     Diagnoses and all orders for this visit:    1. Acquired hypothyroidism (Primary)    2. Chronic pain syndrome    3. Chronic fatigue  -     CBC & Differential    4. Weakness  -     Cancel: AChR Modulating, Serum  -     Acetylcholine Receptor Antibody Panel    Continue present treatment with levothyroxine for hypothyroidism  Continue routine follow-up with pain management  Results of blood work for further evaluation of fatigue  Worsening symptoms to [facility] emergency department       Follow Up     Return in about 1 week (around 7/10/2024), or if symptoms worsen or fail to improve, for Recheck.  Patient was given instructions and counseling regarding his condition or for health maintenance advice. Please see specific information pulled into the AVS if appropriate.

## 2024-07-10 LAB
ACHR BIND AB SER-SCNC: <0.03 NMOL/L (ref 0–0.24)
ACHR BLOCK AB SER-ACNC: 25 % (ref 0–25)
ACHR MOD AB SER QL FC: 8 % (ref 0–45)
BASOPHILS # BLD AUTO: 0 X10E3/UL (ref 0–0.2)
BASOPHILS NFR BLD AUTO: 1 %
EOSINOPHIL # BLD AUTO: 0.2 X10E3/UL (ref 0–0.4)
EOSINOPHIL NFR BLD AUTO: 4 %
ERYTHROCYTE [DISTWIDTH] IN BLOOD BY AUTOMATED COUNT: 12.4 % (ref 11.6–15.4)
HCT VFR BLD AUTO: 46.5 % (ref 37.5–51)
HGB BLD-MCNC: 15.8 G/DL (ref 13–17.7)
IMM GRANULOCYTES # BLD AUTO: 0 X10E3/UL (ref 0–0.1)
IMM GRANULOCYTES NFR BLD AUTO: 0 %
LYMPHOCYTES # BLD AUTO: 2.1 X10E3/UL (ref 0.7–3.1)
LYMPHOCYTES NFR BLD AUTO: 36 %
MCH RBC QN AUTO: 31.5 PG (ref 26.6–33)
MCHC RBC AUTO-ENTMCNC: 34 G/DL (ref 31.5–35.7)
MCV RBC AUTO: 93 FL (ref 79–97)
MONOCYTES # BLD AUTO: 0.4 X10E3/UL (ref 0.1–0.9)
MONOCYTES NFR BLD AUTO: 8 %
NEUTROPHILS # BLD AUTO: 3 X10E3/UL (ref 1.4–7)
NEUTROPHILS NFR BLD AUTO: 51 %
PLATELET # BLD AUTO: 218 X10E3/UL (ref 150–450)
RBC # BLD AUTO: 5.01 X10E6/UL (ref 4.14–5.8)
WBC # BLD AUTO: 5.8 X10E3/UL (ref 3.4–10.8)

## 2024-07-25 ENCOUNTER — OFFICE VISIT (OUTPATIENT)
Dept: INTERNAL MEDICINE | Facility: CLINIC | Age: 64
End: 2024-07-25
Payer: COMMERCIAL

## 2024-07-25 VITALS
HEART RATE: 86 BPM | RESPIRATION RATE: 16 BRPM | DIASTOLIC BLOOD PRESSURE: 78 MMHG | TEMPERATURE: 96.6 F | OXYGEN SATURATION: 95 % | HEIGHT: 74 IN | SYSTOLIC BLOOD PRESSURE: 124 MMHG | BODY MASS INDEX: 29.9 KG/M2 | WEIGHT: 233 LBS

## 2024-07-25 DIAGNOSIS — R53.83 OTHER FATIGUE: Primary | ICD-10-CM

## 2024-07-25 DIAGNOSIS — M79.7 FIBROMYALGIA: ICD-10-CM

## 2024-07-25 DIAGNOSIS — F33.1 MODERATE EPISODE OF RECURRENT MAJOR DEPRESSIVE DISORDER: ICD-10-CM

## 2024-07-25 DIAGNOSIS — F41.9 ANXIETY: ICD-10-CM

## 2024-07-25 PROCEDURE — 99214 OFFICE O/P EST MOD 30 MIN: CPT | Performed by: FAMILY MEDICINE

## 2024-07-25 NOTE — PROGRESS NOTES
"Chief Complaint  Fatigue (Over a month now, not feeling any better since last visit) and Pain (Pt states that his entire body hurts)    Subjective        Joel Keating presents to [facility]  History of Present Illness  Follows up for ongoing evaluation of fatigue and pain all over his body despite treatment through pain service taking gabapentin and methadone daily mood has been fairly stable with Lexapro although persistent problems making him more depressed he has had multiple anxiety and depression medications in the past even was treated for fibromyalgia he has appointment pending with his psychiatrist and will discuss other treatment options  To date has been fairly unremarkable although he has slight development of some leg swelling bilaterally although he is not very physically active due to chronic pain.  1 time he was thought to have lupus however ruled out to rheumatology  Objective   Vital Signs:  /78   Pulse 86   Temp 96.6 °F (35.9 °C) (Temporal)   Resp 16   Ht 188 cm (74.02\")   Wt 106 kg (233 lb)   SpO2 95%   BMI 29.90 kg/m²   Estimated body mass index is 29.9 kg/m² as calculated from the following:    Height as of this encounter: 188 cm (74.02\").    Weight as of this encounter: 106 kg (233 lb).             Physical Exam  Vitals and nursing note reviewed.   Constitutional:       Comments: Ears tired slight weakness leaning on the table from his chair as I walked in the room   HENT:      Head: Normocephalic and atraumatic.   Cardiovascular:      Rate and Rhythm: Normal rate and regular rhythm.      Pulses: Normal pulses.      Heart sounds: Normal heart sounds.   Musculoskeletal:      Right lower leg: Edema present.      Left lower leg: Edema present.      Comments: 1+ bilaterally   Skin:     General: Skin is warm and dry.   Neurological:      Mental Status: He is alert.        Result Review :      Common labs          1/31/2024    14:14 6/26/2024    11:20 " 7/3/2024    10:43   Common Labs   Glucose 118      BUN 16      Creatinine 1.39      Sodium 142      Potassium 4.1      Chloride 103      Calcium 10.0      WBC   5.8    Hemoglobin   15.8    Hematocrit   46.5    Platelets   218    Total Cholesterol 152      Triglycerides 207      HDL Cholesterol 45      LDL Cholesterol  73      Hemoglobin A1C 5.90  5.9           Details          This result is from an external source.                          Assessment and Plan     Diagnoses and all orders for this visit:    1. Other fatigue (Primary)  -     BNP ([facility] Only)  -     C-reactive protein  -     Sedimentation rate, automated    2. Moderate episode of recurrent major depressive disorder    3. Anxiety    4. Fibromyalgia    Recommend follow-up with psychiatry recommend follow-up with neurology and  medication adjustment through pain management  If BMP abnormal will send to cardiology and obtain echocardiogram or symptomology relatable to cardiac dysfunction       Follow Up     Return if symptoms worsen or fail to improve, for Recheck.  Patient was given instructions and counseling regarding his condition or for health maintenance advice. Please see specific information pulled into the AVS if appropriate.

## 2024-07-30 LAB
BNP SERPL-MCNC: 6.9 PG/ML (ref 0–100)
CRP SERPL-MCNC: 2 MG/L (ref 0–10)
ERYTHROCYTE [SEDIMENTATION RATE] IN BLOOD BY WESTERGREN METHOD: 2 MM/HR (ref 0–30)

## 2024-08-20 ENCOUNTER — OFFICE VISIT (OUTPATIENT)
Dept: CARDIOLOGY | Facility: CLINIC | Age: 64
End: 2024-08-20
Payer: COMMERCIAL

## 2024-08-20 VITALS
BODY MASS INDEX: 30.29 KG/M2 | DIASTOLIC BLOOD PRESSURE: 74 MMHG | HEIGHT: 74 IN | HEART RATE: 83 BPM | SYSTOLIC BLOOD PRESSURE: 128 MMHG | WEIGHT: 236 LBS

## 2024-08-20 DIAGNOSIS — I25.10 CHRONIC CORONARY ARTERY DISEASE: ICD-10-CM

## 2024-08-20 DIAGNOSIS — R06.09 DYSPNEA ON EXERTION: Primary | ICD-10-CM

## 2024-08-20 NOTE — PROGRESS NOTES
Subjective:     Encounter Date:08/20/2024      Patient ID: Joel Keating is a 64 y.o. male.    Chief Complaint:hospital follow up, CP  History of Present Illness  This is a 65 y/o man who follows with Dr. Johnson and is known to me. He has a past medical history of mild nonobstructive coronary artery disease, fibromyalgia, anxiety, hypothyroidism.     He is here today for a follow up visit. He says around June 20th, he developed severe fatigue and was in bed for about 6 weeks. His pain was more intense than what he typically has with his fibromyalgia. He couldn't walk to the bathroom without being tired. He started riboflavin supplement and this has helped. He says he fatigue has improved but is still worse than when he last saw us. He has been having worsening shortness of breath for the last month and a half. Walking up a few steps or lifting something more than 10-20 lbs makes him short of breath. When he is breathing harder, his heart starts beating fast. This is not new for him but it is worse. He saw his PCP and his inflammatory markers were fine as was the rest of his labwork. He has also been to his pain doctor and everything has checked out ok. He denies any significant swelling in his lower extremities, orthopnea or PND. He says he almost passed out in the shower last night and his wife had to help him. His blood pressure has been good. He did follow up with sleep medicine and he is not a candidate for Inspire. He cannot tolerate a CPAP mask.     Prior history:  He was previously a patient of Dr. Ventura whom he began seeing in 2014 when he presented to the hospital with unstable angina. A cardiac catheterization was performed that showed mild nonobstructive CAD and small myocardial bridge of the LAD.     He was seen by PEPPER Veloz in October 2021 and had complaints of a syncopal episode. A ZIO and echocardiogram were both unremarkable.    He was seen in the office on 9/20 with complaints of chest  pain occurring at rest and with exertion that seemed to be getting worse. Dr. Johnson ordered for the patient to undergo a stress test and echocardiogram His echocardiogram showed an EF of 57.8% with normal left ventricular systolic function, normal diastolic function and mild dilatation of the aortic root and proximal ascending aorta measuring about 4.0 cm. During his stress test, he was administered regadenoson and became disoriented and confused. He also had complaints of chest pain, left arm pain and diaphoresis. He had a brief syncopal episode as well. He was given reversal agent and remained confused so he was sent to the ED. He was ultimately admitted for further evaluation. He underwent a cardiac catheterization that showed stable nonobstructive coronary artery disease. At discharge, he was still having chest discomfort and fatigue. He was started on isosorbide for the chest pain. Unfortunately, this caused massive headaches and had to be stopped.    I saw him in October 2022 and he was still having shortness of breath and dizziness. I recommended calcium channel blocker for his myocardial bridge but he declined. I did refer to pulmonology.    Dr. Johnson saw him in August 2023 and he had been evaluated by pulmonology. They were planning to obtain a high-res CT to evaluate for fibrosis and discussed repeating a sleep study. The patient did not want a repeat sleep study as he had been intolerant to wearing a CPAP in the past. Dr. Johnson did start him on a low dose atorvastatin for his CAD.    He was last seen in February 2024 by Dr. Johnson and reported worsening fatigue. He felt the atorvastatin may be contributing but opted to not hold it as he felt the treatment of his CAD was more beneficial.    I have reviewed and updated as appropriate allergies, current medications, past family history, past medical history, past surgical history and problem list.    Review of Systems   Constitutional: Positive for  malaise/fatigue. Negative for diaphoresis, fever, weight gain and weight loss.   HENT:  Negative for congestion, hoarse voice and sore throat.    Eyes:  Negative for blurred vision and double vision.   Cardiovascular:  Positive for dyspnea on exertion. Negative for chest pain, leg swelling, orthopnea, palpitations and syncope.   Respiratory:  Positive for shortness of breath. Negative for cough and wheezing.    Gastrointestinal:  Negative for abdominal pain, hematemesis, hematochezia and melena.   Genitourinary:  Negative for dysuria and hematuria.   Neurological:  Negative for dizziness, headaches, light-headedness and numbness.   Psychiatric/Behavioral:  Negative for depression. The patient is not nervous/anxious.          Current Outpatient Medications:     ALLERGY SERUM SUBLINGUAL DROPS, Place  under the tongue Daily., Disp: , Rfl:     amitriptyline (ELAVIL) 10 MG tablet, Take 1 tablet by mouth Every Night., Disp: , Rfl:     aspirin 81 MG EC tablet, Take 1 tablet by mouth Daily., Disp: , Rfl:     atorvastatin (LIPITOR) 10 MG tablet, TAKE 1 TABLET BY MOUTH DAILY, Disp: 30 tablet, Rfl: 5    coenzyme Q10 100 MG capsule, Take 1 capsule by mouth Daily., Disp: , Rfl:     EPINEPHrine (EPIPEN) 0.3 MG/0.3ML solution auto-injector injection, , Disp: , Rfl:     escitalopram (LEXAPRO) 20 MG tablet, Take 1 tablet by mouth Daily., Disp: , Rfl:     famotidine (PEPCID) 20 MG tablet, Take 1 tablet by mouth 2 (Two) Times a Day., Disp: , Rfl:     fluticasone (FLONASE) 50 MCG/ACT nasal spray, 1 spray into each nostril 2 (two) times a day. Administer 2 sprays in each nostril for each dose. (Patient taking differently: 2 sprays into the nostril(s) as directed by provider Daily. Administer 2 sprays in each nostril for each dose.), Disp: 3 each, Rfl: 3    gabapentin (NEURONTIN) 600 MG tablet, TAKE 2 TABLETS BY MOUTH  TWICE A DAY. MAXIMUM DAILY  AMOUNT: 2400MG, Disp: , Rfl:     melatonin 5 MG tablet tablet, Take 2 tablets by mouth Every  Night., Disp: , Rfl:     methadone (DOLOPHINE) 5 MG tablet, Take 1 tablet by mouth 2 (Two) Times a Day., Disp: , Rfl:     Multiple Vitamins-Minerals (MULTI COMPLETE PO), Take  by mouth., Disp: , Rfl:     Narcan 4 MG/0.1ML nasal spray, , Disp: , Rfl:     ondansetron (ZOFRAN) 4 MG tablet, Take 1 tablet by mouth Every 6 (Six) Hours As Needed for Nausea or Vomiting. Take for nausea and vomiting., Disp: , Rfl:     Probiotic Product (PROBIOTIC-10 PO), Take  by mouth., Disp: , Rfl:     Sennosides (SENNA) 8.6 MG capsule, Take 1 capsule by mouth Daily., Disp: , Rfl:     Synthroid 112 MCG tablet, TAKE 1 TABLET BY MOUTH DAILY, Disp: 90 tablet, Rfl: 1    tamsulosin (FLOMAX) 0.4 MG capsule 24 hr capsule, Take 1 capsule by mouth Daily., Disp: , Rfl:     tiZANidine (ZANAFLEX) 4 MG tablet, Take 1 tablet by mouth Every 6 (Six) Hours As Needed., Disp: , Rfl:     Unable to find, Domperidone POWD; 1 tablet twice daily., Disp: , Rfl:     Unable to find, Domperidone 10 mg once daily, Disp: , Rfl:     Past Medical History:   Diagnosis Date    Abdominal pain     Recurrent sx and follows with Dr. Heard.    Abscess of buttock     Acute gastritis     Allergic rhinitis     Alopecia     Angina pectoris     Anxiety     Arteriosclerotic coronary artery disease     Benign prostatic hypertrophy (BPH) with nocturia     Calf tenderness     Cervical neck pain with evidence of disc disease     CSA (central sleep apnea) 09/14/2015    Overnight polysomnogram.  Weight 205 pounds.  AHI severely abnormal at 58 events per hour.  Central sleep apnea index 34 events per hour.  Obstructive sleep apnea index 24 events per hour.  No sleep-related hypoxia.  Auto SV titration performed 11/2/2015.    DDD (degenerative disc disease), lumbar     Degeneration of cervical intervertebral disc     Depression     Esophageal reflux     Follows with Dr. Jackson    Fatigue     Fever 04/2015    Low grade symptoms of fever for 3 weeks.    Fibromyalgia     Gastroparesis      Health care maintenance     Heart palpitations     History of deep vein thrombophlebitis of lower extremity 2003    DVT Dx 2003    Hypokalemia     Hypothyroidism     Idiopathic peripheral neuropathy     Impaired fasting glucose     Insomnia     Lichen planus     tongue    LOM (loss of memory)     Mental status change     Episodes where he stares ahead and have some shaking of his limbs.  Possible seizure activity.  Referred to Dr. Santana, October 2, 2013.    Nausea     Onychomycosis of toenail     GAURANG (obstructive sleep apnea) 05/08/2007    Overnight polysomnogram.  Weight 247 pounds.  AHI mildly abnormal at 11 events per hour without sleep-related hypoxia noted.    Osteoarthritis of hip     PN (peripheral neuropathy)     Primary hypothyroidism     Rash     Tendonitis of left hip     Tendonitis of right hip     TIA (transient ischemic attack) 06/2016    Tinea versicolor        Past Surgical History:   Procedure Laterality Date    APPENDECTOMY      BACK SURGERY      Cervical    CARDIAC CATHETERIZATION N/A 10/05/2022    Procedure: Left Heart Cath;  Surgeon: Shanta Johnson MD;  Location:  [facility];  Service: Cardiology;  Laterality: N/A;    CARDIAC CATHETERIZATION N/A 10/05/2022    Procedure: Coronary angiography;  Surgeon: Shanta Johnson MD;  Location:  [facility];  Service: Cardiology;  Laterality: N/A;    CHOLECYSTECTOMY      COLONOSCOPY  12/2022    Normal.  Dr. Jackson    JOINT REPLACEMENT Bilateral     Hip    LUMBAR FUSION      SURAL NERVE/MUSCLE BIOPSY  04/2016    TONSILLECTOMY         Family History   Problem Relation Age of Onset    Heart failure Mother         Congestive    Heart disease Mother     Atrial fibrillation Mother     Alcohol abuse Paternal Uncle     Heart failure Paternal Grandmother         Congestive    No Known Problems Father     No Known Problems Sister     No Known Problems Brother     Heart failure Maternal Grandmother     Heart disease Maternal  "Grandmother     No Known Problems Maternal Grandfather     No Known Problems Paternal Grandfather        Social History     Tobacco Use    Smoking status: Never     Passive exposure: Never    Smokeless tobacco: Never   Vaping Use    Vaping status: Never Used   Substance Use Topics    Alcohol use: No    Drug use: No         ECG 12 Lead    Date/Time: 8/20/2024 1:14 PM  Performed by: Taylor Stone APRN    Authorized by: Taylor Stone APRN  Comparison: compared with previous ECG from 2/20/2024  Similar to previous ECG  Rhythm: sinus rhythm             Objective:     Visit Vitals  /74   Pulse 83   Ht 188 cm (74\")   Wt 107 kg (236 lb)   BMI 30.30 kg/m²             Physical Exam  Constitutional:       Appearance: Normal appearance. He is normal weight.   HENT:      Head: Normocephalic.   Neck:      Vascular: No carotid bruit.   Cardiovascular:      Rate and Rhythm: Normal rate and regular rhythm.      Chest Wall: PMI is not displaced.      Pulses: Normal pulses.           Radial pulses are 2+ on the right side and 2+ on the left side.        Posterior tibial pulses are 2+ on the right side and 2+ on the left side.      Heart sounds: Normal heart sounds. No murmur heard.     No friction rub. No gallop.   Pulmonary:      Effort: Pulmonary effort is normal.      Breath sounds: Normal breath sounds.   Abdominal:      General: Bowel sounds are normal. There is no distension.      Palpations: Abdomen is soft.   Musculoskeletal:      Right lower leg: No edema.      Left lower leg: No edema.   Skin:     General: Skin is warm and dry.      Capillary Refill: Capillary refill takes less than 2 seconds.   Neurological:      Mental Status: He is alert and oriented to person, place, and time.   Psychiatric:         Mood and Affect: Mood normal.         Behavior: Behavior normal.         Thought Content: Thought content normal.          Lab Review:   Lipid Panel          1/31/2024    14:14   Lipid Panel   Total Cholesterol 152  "   Triglycerides 207    HDL Cholesterol 45    VLDL Cholesterol 34    LDL Cholesterol  73          Cardiac Procedures:   Cardiac catheterization 10/5/22:  CONCLUSION:  1.  Stable nonobstructive coronary artery disease:  *Stable 30 to 40% mid LAD stenosis and a moderate myocardial bridge involving the mid and distal LAD  *Normal left main, ramus intermedius, left circumflex arteries  *Stable 40 to 50% proximal RCA stenosis     RECOMMENDATIONS:  We will check a D-dimer.  Medical management of nonobstructive coronary artery disease.  Observe overnight.     FINDINGS:  SELECTIVE CORONARY ANGIOGRAMS:  1.  Left main artery: Large-caliber vessel with 0% stenosis.  The vessel trifurcates into left anterior descending, ramus intermedius, and left circumflex arteries.  2.  Left anterior descending artery: Large-caliber vessel with 0% proximal stenosis.  The mid vessel has 30 to 40% stenosis which is stable in appearance compared to prior angiograms.  Gives rise to small caliber diagonal branch with no significant disease.  Mid LAD then has a moderate myocardial bridge.  The distal LAD also appears to have a moderate myocardial bridge and is a moderate caliber vessel with otherwise no significant disease.  3.  Ramus intermedius: Large-caliber vessel with 0% stenosis.  4.  Left circumflex artery: Large-caliber vessel with 0% proximal to mid stenosis.  The distal vessel gives rise to a large caliber obtuse marginal branch with no significant disease.  The continuation of the circumflex vessel tapers to small caliber with no significant stenosis.  5.  Right coronary artery: Large-caliber vessel with 40 to 50% proximal stenosis which is stable in appearance.  Mid to distal vessel has no significant disease.  The distal vessel bifurcates into a moderate caliber posterior descending and a small caliber posterolateral branch both with 0% stenosis.  This is a right dominant system.     LEFT VENTRICULAR HEMODYNAMICS:  1.  Left ventricular  pressure: 96/11  2.  Aortic pressure: 99/31    Lexiscan stress test 10/5/22:  Patient was given Lexiscan. Patient became disoriented and confused. Patient was given reversal agent of aminophylline 250 mcg. Despite that he continued to be confused. I personally evaluated patient and he was sent to the emergency room because his confusion was not clearing. He did not have any focal deficits.  Echocardiogram 10/5/22:  Calculated left ventricular EF = 57.8% Estimated left ventricular EF was in agreement with the calculated left ventricular EF. Left ventricular systolic function is normal.  Left ventricular diastolic function was normal.  Mild dilatation of aortic root and proximal ascending aorta measuring about 4.0 cm.     Echocardiogram 11/3/21:  Calculated left ventricular EF = 57.7% Estimated left ventricular EF = 58% Estimated left ventricular EF was in agreement with the calculated left ventricular EF. Left ventricular systolic function is normal. Normal left ventricular cavity size and wall thickness noted. All left ventricular wall segments contract normally. Left ventricular diastolic function is consistent with (grade I) impaired relaxation.  Trace tricuspid valve regurgitation is present. Insufficient TR velocity profile to estimate the right ventricular systolic pressure.  Aortic root = 4.0 cm Borderline dilation of the sinuses of Valsalva is present. Sinus of Valsalva = 3.6 cm Borderline dilation of the ascending aorta is present.      Assessment:         Diagnoses and all orders for this visit:    1. Dyspnea on exertion (Primary)  -     Adult Transthoracic Echo Complete w/ Color, Spectral and Contrast if Necessary Per Protocol; Future    2. Non-obstructive coronary artery disease              Plan:       Fatigue/shortness of breath: this has been worse over the last few months. EKG is stable. Will check an echocardiogram.   CAD: non-obstructive per recent cardiac catheterization with 30-40% LAD stenosis  and 40-50% RCA stenosis. On aspirin and statin. EKG stable. Continue current medication regimen.   HTN: blood pressure well controlled.  GAURANG: intolerant to CPAP and not a candidate for Inspire. I suspect this is largely contributing to his fatigue.  Depression/anxiety  Fibromyalgia    Thank you for allowing me to participate in this patient's care. Please call with any questions or concerns. Mr. Keating will follow up with Dr. Johnson in 3 months.          Your medication list            Accurate as of August 20, 2024  1:13 PM. If you have any questions, ask your nurse or doctor.                CHANGE how you take these medications        Instructions Last Dose Given Next Dose Due   fluticasone 50 MCG/ACT nasal spray  Commonly known as: FLONASE  What changed:   how much to take  when to take this      1 spray into each nostril 2 (two) times a day. Administer 2 sprays in each nostril for each dose.              CONTINUE taking these medications        Instructions Last Dose Given Next Dose Due   ALLERGY SERUM SUBLINGUAL DROPS      Place  under the tongue Daily.       amitriptyline 10 MG tablet  Commonly known as: ELAVIL      Take 1 tablet by mouth Every Night.       aspirin 81 MG EC tablet      Take 1 tablet by mouth Daily.       atorvastatin 10 MG tablet  Commonly known as: LIPITOR      TAKE 1 TABLET BY MOUTH DAILY       coenzyme Q10 100 MG capsule      Take 1 capsule by mouth Daily.       EPINEPHrine 0.3 MG/0.3ML solution auto-injector injection  Commonly known as: EPIPEN           escitalopram 20 MG tablet  Commonly known as: LEXAPRO      Take 1 tablet by mouth Daily.       famotidine 20 MG tablet  Commonly known as: PEPCID      Take 1 tablet by mouth 2 (Two) Times a Day.       gabapentin 600 MG tablet  Commonly known as: NEURONTIN      TAKE 2 TABLETS BY MOUTH  TWICE A DAY. MAXIMUM DAILY  AMOUNT: 2400MG       melatonin 5 MG tablet tablet      Take 2 tablets by mouth Every Night.       methadone 5 MG tablet  Commonly  known as: DOLOPHINE      Take 1 tablet by mouth 2 (Two) Times a Day.       MULTI COMPLETE PO      Take  by mouth.       Narcan 4 MG/0.1ML nasal spray  Generic drug: naloxone           ondansetron 4 MG tablet  Commonly known as: ZOFRAN      Take 1 tablet by mouth Every 6 (Six) Hours As Needed for Nausea or Vomiting. Take for nausea and vomiting.       PROBIOTIC-10 PO      Take  by mouth.       Senna 8.6 MG capsule      Take 1 capsule by mouth Daily.       Synthroid 112 MCG tablet  Generic drug: levothyroxine      TAKE 1 TABLET BY MOUTH DAILY       tamsulosin 0.4 MG capsule 24 hr capsule  Commonly known as: FLOMAX      Take 1 capsule by mouth Daily.       tiZANidine 4 MG tablet  Commonly known as: ZANAFLEX      Take 1 tablet by mouth Every 6 (Six) Hours As Needed.       Unable to find      Domperidone 10 mg once daily       Unable to find      Domperidone POWD; 1 tablet twice daily.                  Taylor Stone, APRN  08/20/24  10:42 AM EDT

## 2024-09-17 RX ORDER — ATORVASTATIN CALCIUM 10 MG/1
10 TABLET, FILM COATED ORAL DAILY
Qty: 90 TABLET | Refills: 1 | Status: SHIPPED | OUTPATIENT
Start: 2024-09-17

## 2024-10-31 ENCOUNTER — HOSPITAL ENCOUNTER (OUTPATIENT)
Dept: CARDIOLOGY | Facility: HOSPITAL | Age: 64
Discharge: HOME | End: 2024-10-31
Admitting: NURSE PRACTITIONER
Payer: MEDICARE

## 2024-10-31 VITALS
DIASTOLIC BLOOD PRESSURE: 64 MMHG | BODY MASS INDEX: 30.29 KG/M2 | HEIGHT: 74 IN | WEIGHT: 236 LBS | SYSTOLIC BLOOD PRESSURE: 106 MMHG

## 2024-10-31 DIAGNOSIS — R06.09 DYSPNEA ON EXERTION: ICD-10-CM

## 2024-10-31 LAB
AORTIC ARCH: 2.8 CM
AORTIC DIMENSIONLESS INDEX: 0.8 (DI)
ASCENDING AORTA: 3.6 CM
BH CV ECHO LEFT VENTRICLE GLOBAL LONGITUDINAL STRAIN: -16.9 %
BH CV ECHO MEAS - ACS: 2.7 CM
BH CV ECHO MEAS - AO MAX PG: 3.8 MMHG
BH CV ECHO MEAS - AO MEAN PG: 2 MMHG
BH CV ECHO MEAS - AO ROOT DIAM: 4 CM
BH CV ECHO MEAS - AO V2 MAX: 97.7 CM/SEC
BH CV ECHO MEAS - AO V2 VTI: 16.8 CM
BH CV ECHO MEAS - AVA(I,D): 3.1 CM2
BH CV ECHO MEAS - EDV(CUBED): 85.2 ML
BH CV ECHO MEAS - EDV(MOD-SP2): 70 ML
BH CV ECHO MEAS - EDV(MOD-SP4): 70 ML
BH CV ECHO MEAS - EF(MOD-BP): 56.4 %
BH CV ECHO MEAS - EF(MOD-SP2): 57.1 %
BH CV ECHO MEAS - EF(MOD-SP4): 57.1 %
BH CV ECHO MEAS - ESV(CUBED): 23.4 ML
BH CV ECHO MEAS - ESV(MOD-SP2): 30 ML
BH CV ECHO MEAS - ESV(MOD-SP4): 30 ML
BH CV ECHO MEAS - FS: 35 %
BH CV ECHO MEAS - IVS/LVPW: 0.69 CM
BH CV ECHO MEAS - IVSD: 0.9 CM
BH CV ECHO MEAS - LAT PEAK E' VEL: 8.5 CM/SEC
BH CV ECHO MEAS - LV DIASTOLIC VOL/BSA (35-75): 30 CM2
BH CV ECHO MEAS - LV MASS(C)D: 168.9 GRAMS
BH CV ECHO MEAS - LV MAX PG: 2.46 MMHG
BH CV ECHO MEAS - LV MEAN PG: 1 MMHG
BH CV ECHO MEAS - LV SYSTOLIC VOL/BSA (12-30): 12.9 CM2
BH CV ECHO MEAS - LV V1 MAX: 78.4 CM/SEC
BH CV ECHO MEAS - LV V1 VTI: 13.6 CM
BH CV ECHO MEAS - LVIDD: 4.4 CM
BH CV ECHO MEAS - LVIDS: 2.9 CM
BH CV ECHO MEAS - LVOT AREA: 3.8 CM2
BH CV ECHO MEAS - LVOT DIAM: 2.2 CM
BH CV ECHO MEAS - LVPWD: 1.3 CM
BH CV ECHO MEAS - MED PEAK E' VEL: 7.3 CM/SEC
BH CV ECHO MEAS - MV A DUR: 0.12 SEC
BH CV ECHO MEAS - MV A MAX VEL: 65.6 CM/SEC
BH CV ECHO MEAS - MV DEC SLOPE: 318.9 CM/SEC2
BH CV ECHO MEAS - MV DEC TIME: 0.14 SEC
BH CV ECHO MEAS - MV E MAX VEL: 43.3 CM/SEC
BH CV ECHO MEAS - MV E/A: 0.66
BH CV ECHO MEAS - MV MAX PG: 2.06 MMHG
BH CV ECHO MEAS - MV MEAN PG: 0.93 MMHG
BH CV ECHO MEAS - MV P1/2T: 56.1 MSEC
BH CV ECHO MEAS - MV V2 VTI: 17.1 CM
BH CV ECHO MEAS - MVA(P1/2T): 3.9 CM2
BH CV ECHO MEAS - MVA(VTI): 3 CM2
BH CV ECHO MEAS - PA ACC TIME: 0.09 SEC
BH CV ECHO MEAS - PA V2 MAX: 175.1 CM/SEC
BH CV ECHO MEAS - PULM A REVS DUR: 0.12 SEC
BH CV ECHO MEAS - PULM A REVS VEL: 50.9 CM/SEC
BH CV ECHO MEAS - PULM DIAS VEL: 35.5 CM/SEC
BH CV ECHO MEAS - PULM S/D: 1.86
BH CV ECHO MEAS - PULM SYS VEL: 65.9 CM/SEC
BH CV ECHO MEAS - QP/QS: 1.21
BH CV ECHO MEAS - RAP SYSTOLE: 3 MMHG
BH CV ECHO MEAS - RV MAX PG: 1.61 MMHG
BH CV ECHO MEAS - RV V1 MAX: 63.4 CM/SEC
BH CV ECHO MEAS - RV V1 VTI: 12.1 CM
BH CV ECHO MEAS - RVOT DIAM: 2.6 CM
BH CV ECHO MEAS - RVSP: 29 MMHG
BH CV ECHO MEAS - SV(LVOT): 51.9 ML
BH CV ECHO MEAS - SV(MOD-SP2): 40 ML
BH CV ECHO MEAS - SV(MOD-SP4): 40 ML
BH CV ECHO MEAS - SV(RVOT): 62.8 ML
BH CV ECHO MEAS - SVI(LVOT): 22.3 ML/M2
BH CV ECHO MEAS - SVI(MOD-SP2): 17.2 ML/M2
BH CV ECHO MEAS - SVI(MOD-SP4): 17.2 ML/M2
BH CV ECHO MEAS - TAPSE (>1.6): 2.09 CM
BH CV ECHO MEAS - TR MAX PG: 25.8 MMHG
BH CV ECHO MEAS - TR MAX VEL: 253.8 CM/SEC
BH CV ECHO MEASUREMENTS AVERAGE E/E' RATIO: 5.48
BH CV XLRA - RV BASE: 2.9 CM
BH CV XLRA - RV LENGTH: 7.1 CM
BH CV XLRA - RV MID: 2.7 CM
BH CV XLRA - TDI S': 12 CM/SEC
LEFT ATRIUM VOLUME INDEX: 21.2 ML/M2
SINUS: 3.5 CM
STJ: 3.2 CM

## 2024-10-31 PROCEDURE — 93356 MYOCRD STRAIN IMG SPCKL TRCK: CPT

## 2024-10-31 PROCEDURE — 93306 TTE W/DOPPLER COMPLETE: CPT

## 2024-11-01 DIAGNOSIS — E85.9 AMYLOIDOSIS, UNSPECIFIED TYPE: Primary | ICD-10-CM

## 2024-11-01 NOTE — PROGRESS NOTES
Discussed results of echo with patient set him up for a PYP scan. He has an appointment with you in a few weeks.

## 2024-11-04 ENCOUNTER — APPOINTMENT (OUTPATIENT)
Dept: GENERAL RADIOLOGY | Facility: HOSPITAL | Age: 64
End: 2024-11-04
Payer: MEDICARE

## 2024-11-04 ENCOUNTER — HOSPITAL ENCOUNTER (OUTPATIENT)
Facility: HOSPITAL | Age: 64
Setting detail: OBSERVATION
Discharge: HOME | End: 2024-11-06
Attending: EMERGENCY MEDICINE | Admitting: INTERNAL MEDICINE
Payer: MEDICARE

## 2024-11-04 ENCOUNTER — TELEPHONE (OUTPATIENT)
Dept: CARDIOLOGY | Facility: CLINIC | Age: 64
End: 2024-11-04
Payer: MEDICARE

## 2024-11-04 DIAGNOSIS — R07.9 CHEST PAIN, UNSPECIFIED TYPE: Primary | ICD-10-CM

## 2024-11-04 DIAGNOSIS — R07.2 PRECORDIAL PAIN: ICD-10-CM

## 2024-11-04 LAB
ALBUMIN SERPL-MCNC: 4.6 G/DL (ref 3.5–5.2)
ALBUMIN/GLOB SERPL: 1.7 G/DL
ALP SERPL-CCNC: 118 U/L (ref 39–117)
ALT SERPL W P-5'-P-CCNC: 17 U/L (ref 1–41)
ANION GAP SERPL CALCULATED.3IONS-SCNC: 11.5 MMOL/L (ref 5–15)
AST SERPL-CCNC: 18 U/L (ref 1–40)
BASOPHILS # BLD AUTO: 0.02 10*3/MM3 (ref 0–0.2)
BASOPHILS NFR BLD AUTO: 0.3 % (ref 0–1.5)
BILIRUB SERPL-MCNC: 0.5 MG/DL (ref 0–1.2)
BUN SERPL-MCNC: 18 MG/DL (ref 8–23)
BUN/CREAT SERPL: 11.4 (ref 7–25)
CALCIUM SPEC-SCNC: 9.3 MG/DL (ref 8.6–10.5)
CHLORIDE SERPL-SCNC: 109 MMOL/L (ref 98–107)
CO2 SERPL-SCNC: 25.5 MMOL/L (ref 22–29)
CREAT SERPL-MCNC: 1.58 MG/DL (ref 0.76–1.27)
D DIMER PPP FEU-MCNC: 0.42 MCGFEU/ML (ref 0–0.66)
DEPRECATED RDW RBC AUTO: 42 FL (ref 37–54)
EGFRCR SERPLBLD CKD-EPI 2021: 47.9 ML/MIN/1.73
EOSINOPHIL # BLD AUTO: 0.27 10*3/MM3 (ref 0–0.4)
EOSINOPHIL NFR BLD AUTO: 3.4 % (ref 0.3–6.2)
ERYTHROCYTE [DISTWIDTH] IN BLOOD BY AUTOMATED COUNT: 12.3 % (ref 12.3–15.4)
GEN 5 2HR TROPONIN T REFLEX: 9 NG/L
GLOBULIN UR ELPH-MCNC: 2.7 GM/DL
GLUCOSE SERPL-MCNC: 175 MG/DL (ref 65–99)
HCT VFR BLD AUTO: 45.2 % (ref 37.5–51)
HGB BLD-MCNC: 15.5 G/DL (ref 13–17.7)
HOLD SPECIMEN: NORMAL
HOLD SPECIMEN: NORMAL
IMM GRANULOCYTES # BLD AUTO: 0.02 10*3/MM3 (ref 0–0.05)
IMM GRANULOCYTES NFR BLD AUTO: 0.3 % (ref 0–0.5)
LYMPHOCYTES # BLD AUTO: 2.08 10*3/MM3 (ref 0.7–3.1)
LYMPHOCYTES NFR BLD AUTO: 26.3 % (ref 19.6–45.3)
MCH RBC QN AUTO: 31.8 PG (ref 26.6–33)
MCHC RBC AUTO-ENTMCNC: 34.3 G/DL (ref 31.5–35.7)
MCV RBC AUTO: 92.8 FL (ref 79–97)
MONOCYTES # BLD AUTO: 0.42 10*3/MM3 (ref 0.1–0.9)
MONOCYTES NFR BLD AUTO: 5.3 % (ref 5–12)
NEUTROPHILS NFR BLD AUTO: 5.11 10*3/MM3 (ref 1.7–7)
NEUTROPHILS NFR BLD AUTO: 64.4 % (ref 42.7–76)
NRBC BLD AUTO-RTO: 0 /100 WBC (ref 0–0.2)
NT-PROBNP SERPL-MCNC: <36 PG/ML (ref 0–900)
PLATELET # BLD AUTO: 204 10*3/MM3 (ref 140–450)
PMV BLD AUTO: 8.1 FL (ref 6–12)
POTASSIUM SERPL-SCNC: 4.2 MMOL/L (ref 3.5–5.2)
PROT SERPL-MCNC: 7.3 G/DL (ref 6–8.5)
QT INTERVAL: 322 MS
QTC INTERVAL: 440 MS
RBC # BLD AUTO: 4.87 10*6/MM3 (ref 4.14–5.8)
SODIUM SERPL-SCNC: 146 MMOL/L (ref 136–145)
TROPONIN T DELTA: -1 NG/L
TROPONIN T SERPL HS-MCNC: 10 NG/L
WBC NRBC COR # BLD AUTO: 7.92 10*3/MM3 (ref 3.4–10.8)
WHOLE BLOOD HOLD COAG: NORMAL
WHOLE BLOOD HOLD SPECIMEN: NORMAL

## 2024-11-04 PROCEDURE — 84484 ASSAY OF TROPONIN QUANT: CPT | Performed by: EMERGENCY MEDICINE

## 2024-11-04 PROCEDURE — 93010 ELECTROCARDIOGRAM REPORT: CPT | Performed by: INTERNAL MEDICINE

## 2024-11-04 PROCEDURE — 83880 ASSAY OF NATRIURETIC PEPTIDE: CPT | Performed by: PHYSICIAN ASSISTANT

## 2024-11-04 PROCEDURE — 71045 X-RAY EXAM CHEST 1 VIEW: CPT

## 2024-11-04 PROCEDURE — 25010000002 ONDANSETRON PER 1 MG: Performed by: EMERGENCY MEDICINE

## 2024-11-04 PROCEDURE — 36415 COLL VENOUS BLD VENIPUNCTURE: CPT | Performed by: EMERGENCY MEDICINE

## 2024-11-04 PROCEDURE — 85379 FIBRIN DEGRADATION QUANT: CPT | Performed by: PHYSICIAN ASSISTANT

## 2024-11-04 PROCEDURE — G0378 HOSPITAL OBSERVATION PER HR: HCPCS

## 2024-11-04 PROCEDURE — 96375 TX/PRO/DX INJ NEW DRUG ADDON: CPT

## 2024-11-04 PROCEDURE — 80053 COMPREHEN METABOLIC PANEL: CPT | Performed by: EMERGENCY MEDICINE

## 2024-11-04 PROCEDURE — 99285 EMERGENCY DEPT VISIT HI MDM: CPT

## 2024-11-04 PROCEDURE — 25010000002 KETOROLAC TROMETHAMINE PER 15 MG

## 2024-11-04 PROCEDURE — 96374 THER/PROPH/DIAG INJ IV PUSH: CPT

## 2024-11-04 PROCEDURE — 85025 COMPLETE CBC W/AUTO DIFF WBC: CPT | Performed by: EMERGENCY MEDICINE

## 2024-11-04 PROCEDURE — 93005 ELECTROCARDIOGRAM TRACING: CPT | Performed by: EMERGENCY MEDICINE

## 2024-11-04 PROCEDURE — 96376 TX/PRO/DX INJ SAME DRUG ADON: CPT

## 2024-11-04 PROCEDURE — 25010000002 MORPHINE PER 10 MG: Performed by: EMERGENCY MEDICINE

## 2024-11-04 RX ORDER — ONDANSETRON 2 MG/ML
4 INJECTION INTRAMUSCULAR; INTRAVENOUS EVERY 6 HOURS PRN
Status: DISCONTINUED | OUTPATIENT
Start: 2024-11-04 | End: 2024-11-04

## 2024-11-04 RX ORDER — BISACODYL 10 MG
10 SUPPOSITORY, RECTAL RECTAL DAILY PRN
Status: DISCONTINUED | OUTPATIENT
Start: 2024-11-04 | End: 2024-11-06 | Stop reason: HOSPADM

## 2024-11-04 RX ORDER — ESCITALOPRAM OXALATE 20 MG/1
20 TABLET ORAL DAILY
Status: DISCONTINUED | OUTPATIENT
Start: 2024-11-05 | End: 2024-11-06 | Stop reason: HOSPADM

## 2024-11-04 RX ORDER — ACETAMINOPHEN 325 MG/1
650 TABLET ORAL EVERY 4 HOURS PRN
Status: DISCONTINUED | OUTPATIENT
Start: 2024-11-04 | End: 2024-11-06 | Stop reason: HOSPADM

## 2024-11-04 RX ORDER — SODIUM CHLORIDE 0.9 % (FLUSH) 0.9 %
10 SYRINGE (ML) INJECTION AS NEEDED
Status: DISCONTINUED | OUTPATIENT
Start: 2024-11-04 | End: 2024-11-06 | Stop reason: HOSPADM

## 2024-11-04 RX ORDER — KETOROLAC TROMETHAMINE 15 MG/ML
15 INJECTION, SOLUTION INTRAMUSCULAR; INTRAVENOUS ONCE
Status: COMPLETED | OUTPATIENT
Start: 2024-11-04 | End: 2024-11-04

## 2024-11-04 RX ORDER — ASPIRIN 81 MG/1
81 TABLET ORAL DAILY
Status: DISCONTINUED | OUTPATIENT
Start: 2024-11-05 | End: 2024-11-06 | Stop reason: HOSPADM

## 2024-11-04 RX ORDER — MORPHINE SULFATE 2 MG/ML
4 INJECTION, SOLUTION INTRAMUSCULAR; INTRAVENOUS ONCE
Status: COMPLETED | OUTPATIENT
Start: 2024-11-04 | End: 2024-11-04

## 2024-11-04 RX ORDER — AMOXICILLIN 250 MG
2 CAPSULE ORAL 2 TIMES DAILY PRN
Status: DISCONTINUED | OUTPATIENT
Start: 2024-11-04 | End: 2024-11-06 | Stop reason: HOSPADM

## 2024-11-04 RX ORDER — NITROGLYCERIN 0.4 MG/1
0.4 TABLET SUBLINGUAL
Status: DISCONTINUED | OUTPATIENT
Start: 2024-11-04 | End: 2024-11-06 | Stop reason: HOSPADM

## 2024-11-04 RX ORDER — MORPHINE SULFATE 2 MG/ML
2 INJECTION, SOLUTION INTRAMUSCULAR; INTRAVENOUS ONCE
Status: DISCONTINUED | OUTPATIENT
Start: 2024-11-04 | End: 2024-11-04

## 2024-11-04 RX ORDER — LEVOTHYROXINE SODIUM 112 UG/1
112 TABLET ORAL DAILY
Status: DISCONTINUED | OUTPATIENT
Start: 2024-11-05 | End: 2024-11-06 | Stop reason: HOSPADM

## 2024-11-04 RX ORDER — BISACODYL 5 MG/1
5 TABLET, DELAYED RELEASE ORAL DAILY PRN
Status: DISCONTINUED | OUTPATIENT
Start: 2024-11-04 | End: 2024-11-06 | Stop reason: HOSPADM

## 2024-11-04 RX ORDER — SODIUM CHLORIDE 0.9 % (FLUSH) 0.9 %
10 SYRINGE (ML) INJECTION EVERY 12 HOURS SCHEDULED
Status: DISCONTINUED | OUTPATIENT
Start: 2024-11-04 | End: 2024-11-06 | Stop reason: HOSPADM

## 2024-11-04 RX ORDER — AMITRIPTYLINE HYDROCHLORIDE 10 MG/1
20 TABLET ORAL NIGHTLY
Status: DISCONTINUED | OUTPATIENT
Start: 2024-11-04 | End: 2024-11-06 | Stop reason: HOSPADM

## 2024-11-04 RX ORDER — TAMSULOSIN HYDROCHLORIDE 0.4 MG/1
0.4 CAPSULE ORAL DAILY
Status: DISCONTINUED | OUTPATIENT
Start: 2024-11-05 | End: 2024-11-06 | Stop reason: HOSPADM

## 2024-11-04 RX ORDER — ONDANSETRON 2 MG/ML
4 INJECTION INTRAMUSCULAR; INTRAVENOUS ONCE
Status: COMPLETED | OUTPATIENT
Start: 2024-11-04 | End: 2024-11-04

## 2024-11-04 RX ORDER — ACETAMINOPHEN 650 MG/1
650 SUPPOSITORY RECTAL EVERY 4 HOURS PRN
Status: DISCONTINUED | OUTPATIENT
Start: 2024-11-04 | End: 2024-11-06 | Stop reason: HOSPADM

## 2024-11-04 RX ORDER — POLYETHYLENE GLYCOL 3350 17 G/17G
17 POWDER, FOR SOLUTION ORAL DAILY PRN
Status: DISCONTINUED | OUTPATIENT
Start: 2024-11-04 | End: 2024-11-06 | Stop reason: HOSPADM

## 2024-11-04 RX ORDER — SENNOSIDES A AND B 8.6 MG/1
1 TABLET, FILM COATED ORAL NIGHTLY
Status: DISCONTINUED | OUTPATIENT
Start: 2024-11-04 | End: 2024-11-06 | Stop reason: HOSPADM

## 2024-11-04 RX ORDER — GABAPENTIN 300 MG/1
600 CAPSULE ORAL EVERY 12 HOURS SCHEDULED
Status: DISCONTINUED | OUTPATIENT
Start: 2024-11-04 | End: 2024-11-06 | Stop reason: HOSPADM

## 2024-11-04 RX ORDER — SODIUM CHLORIDE 9 MG/ML
40 INJECTION, SOLUTION INTRAVENOUS AS NEEDED
Status: DISCONTINUED | OUTPATIENT
Start: 2024-11-04 | End: 2024-11-05

## 2024-11-04 RX ORDER — METHADONE HYDROCHLORIDE 5 MG/1
5 TABLET ORAL 2 TIMES DAILY
Status: DISCONTINUED | OUTPATIENT
Start: 2024-11-04 | End: 2024-11-06 | Stop reason: HOSPADM

## 2024-11-04 RX ORDER — ASPIRIN 325 MG
325 TABLET ORAL ONCE
Status: COMPLETED | OUTPATIENT
Start: 2024-11-04 | End: 2024-11-04

## 2024-11-04 RX ORDER — FAMOTIDINE 20 MG/1
20 TABLET, FILM COATED ORAL 2 TIMES DAILY
Status: DISCONTINUED | OUTPATIENT
Start: 2024-11-04 | End: 2024-11-06 | Stop reason: HOSPADM

## 2024-11-04 RX ORDER — ONDANSETRON 4 MG/1
4 TABLET, ORALLY DISINTEGRATING ORAL EVERY 6 HOURS PRN
Status: DISCONTINUED | OUTPATIENT
Start: 2024-11-04 | End: 2024-11-04

## 2024-11-04 RX ORDER — ACETAMINOPHEN 160 MG/5ML
650 SOLUTION ORAL EVERY 4 HOURS PRN
Status: DISCONTINUED | OUTPATIENT
Start: 2024-11-04 | End: 2024-11-06 | Stop reason: HOSPADM

## 2024-11-04 RX ORDER — ATORVASTATIN CALCIUM 20 MG/1
10 TABLET, FILM COATED ORAL DAILY
Status: DISCONTINUED | OUTPATIENT
Start: 2024-11-05 | End: 2024-11-06 | Stop reason: HOSPADM

## 2024-11-04 RX ADMIN — GABAPENTIN 600 MG: 300 CAPSULE ORAL at 22:23

## 2024-11-04 RX ADMIN — ACETAMINOPHEN 325MG 650 MG: 325 TABLET ORAL at 20:36

## 2024-11-04 RX ADMIN — Medication 10 ML: at 22:25

## 2024-11-04 RX ADMIN — ONDANSETRON 4 MG: 2 INJECTION, SOLUTION INTRAMUSCULAR; INTRAVENOUS at 16:40

## 2024-11-04 RX ADMIN — Medication 10 MG: at 22:24

## 2024-11-04 RX ADMIN — MORPHINE SULFATE 4 MG: 2 INJECTION, SOLUTION INTRAMUSCULAR; INTRAVENOUS at 18:26

## 2024-11-04 RX ADMIN — MORPHINE SULFATE 4 MG: 2 INJECTION, SOLUTION INTRAMUSCULAR; INTRAVENOUS at 16:40

## 2024-11-04 RX ADMIN — KETOROLAC TROMETHAMINE 15 MG: 15 INJECTION, SOLUTION INTRAMUSCULAR; INTRAVENOUS at 22:23

## 2024-11-04 RX ADMIN — FAMOTIDINE 20 MG: 20 TABLET, FILM COATED ORAL at 22:24

## 2024-11-04 RX ADMIN — ASPIRIN 325 MG: 325 TABLET ORAL at 16:48

## 2024-11-04 NOTE — ED PROVIDER NOTES
EMERGENCY DEPARTMENT ENCOUNTER  Room Number:  07/07  PCP: Cesar Hoover MD  Independent Historians: Patient      HPI:  Chief Complaint: had concerns including Chest Pain.     A complete HPI/ROS/PMH/PSH/SH/FH are unobtainable due to: None    Chronic or social conditions impacting patient care (Social Determinants of Health): None      Context: Joel Keating is a 64 y.o. male with a medical history of anxiety, coronary artery disease, BPH, depression, fibromyalgia, gastroparesis, chronic pain syndrome, and GAURANG who presents to the ED c/o acute chest pain and dyspnea.  Patient reports his symptoms have been going on for several months, but worsened over the weekend.  He reports he underwent echocardiogram this past Thursday.  Was told he would need a PYP test.  This is scheduled for the 21st, but he became concerned as the symptoms got worse over the weekend.  He called cardiology who instructed him to come to the emergency department.  States he has aching and pressure across the entire chest.  Reports he gets winded with minimal exertion.  Denies fever or syncope.  No leg swelling.  Patient is not anticoagulated.  Patient has no other systemic complaints at this time.      Review of prior external notes (non-ED) -and- Review of prior external test results outside of this encounter:  Patient seen in office by cardiology on 8/20/2024 for dyspnea on exertion.  Reviewed assessment and plan.  Echocardiogram ordered.  Reviewed labs collected on 7/3/2024.  CBC with hemoglobin 15.8, CRP 2.    Prescription drug monitoring program review: SORAYA reviewed by Doug Weiner MD, Akhil Maxwell MD   N/A    PAST MEDICAL HISTORY  Active Ambulatory Problems     Diagnosis Date Noted    Atopic rhinitis 07/18/2016    Anxiety 07/18/2016    Non-obstructive coronary artery disease 07/18/2016    Benign prostatic hyperplasia with urinary obstruction 07/18/2016    Tenderness of lower extremity 07/18/2016    Degeneration of  intervertebral disc of cervical region 07/18/2016    Depression 07/18/2016    Degeneration of intervertebral disc of lumbar region 07/18/2016    Fatigue 07/18/2016    Fibromyalgia 07/18/2016    Gastroparesis 07/18/2016    Herpes simplex virus (HSV) infection 07/18/2016    Impaired fasting glucose 07/18/2016    Insomnia 07/18/2016    Memory loss 07/18/2016    Onychomycosis of toenail 07/18/2016    Osteoarthritis of hip 07/18/2016    Peripheral neuropathy 07/18/2016    Hypothyroidism 07/18/2016    Obstructive sleep apnea, adult 07/18/2016    Annual physical exam 07/18/2016    Chronic pain syndrome 04/06/2017    Renal insufficiency 08/14/2017    Complex sleep apnea syndrome 11/23/2019    Hypersomnia due to medical condition 11/23/2019    CSA (central sleep apnea) 09/14/2015    Health care maintenance 08/17/2020    Circadian rhythm sleep disorder, delayed sleep phase type 03/22/2021    Colon cancer screening     Lesion of breast 11/24/2021    Chest pressure 10/05/2022    Weakness 07/03/2024     Resolved Ambulatory Problems     Diagnosis Date Noted    Abdominal pain 07/18/2016    Angina pectoris 07/18/2016    Gastroesophageal reflux disease 07/18/2016    Fever and chills 07/18/2016    Altered mental status 07/18/2016    Nausea 07/18/2016    Thrombophlebitis of deep veins of lower extremity 07/18/2016    Tinea versicolor 07/18/2016    Bronchitis 08/14/2017    Precordial pain 09/04/2020    History of deep vein thrombophlebitis of lower extremity 2003    Acute gastritis     Abscess of buttock      Past Medical History:   Diagnosis Date    Allergic rhinitis     Alopecia     Arteriosclerotic coronary artery disease     Benign prostatic hypertrophy (BPH) with nocturia     Calf tenderness     Cervical neck pain with evidence of disc disease     DDD (degenerative disc disease), lumbar     Degeneration of cervical intervertebral disc     Esophageal reflux     Fever 04/2015    Heart palpitations     Hypokalemia     Idiopathic  peripheral neuropathy     Lichen planus     LOM (loss of memory)     Mental status change     GAURANG (obstructive sleep apnea) 05/08/2007    PN (peripheral neuropathy)     Primary hypothyroidism     Rash     Tendonitis of left hip     Tendonitis of right hip     TIA (transient ischemic attack) 06/2016         PAST SURGICAL HISTORY  Past Surgical History:   Procedure Laterality Date    APPENDECTOMY      BACK SURGERY      Cervical    CARDIAC CATHETERIZATION N/A 10/05/2022    Procedure: Left Heart Cath;  Surgeon: Shanta Johnson MD;  Location:  [facility];  Service: Cardiology;  Laterality: N/A;    CARDIAC CATHETERIZATION N/A 10/05/2022    Procedure: Coronary angiography;  Surgeon: Shanta Johnson MD;  Location:  [facility];  Service: Cardiology;  Laterality: N/A;    CHOLECYSTECTOMY      COLONOSCOPY  12/2022    Normal.  Dr. Jackson    JOINT REPLACEMENT Bilateral     Hip    LUMBAR FUSION      SURAL NERVE/MUSCLE BIOPSY  04/2016    TONSILLECTOMY           FAMILY HISTORY  Family History   Problem Relation Age of Onset    Heart failure Mother         Congestive    Heart disease Mother     Atrial fibrillation Mother     Alcohol abuse Paternal Uncle     Heart failure Paternal Grandmother         Congestive    No Known Problems Father     No Known Problems Sister     No Known Problems Brother     Heart failure Maternal Grandmother     Heart disease Maternal Grandmother     No Known Problems Maternal Grandfather     No Known Problems Paternal Grandfather          SOCIAL HISTORY  Social History     Socioeconomic History    Marital status:     Number of children: 2   Tobacco Use    Smoking status: Never     Passive exposure: Never    Smokeless tobacco: Never   Vaping Use    Vaping status: Never Used   Substance and Sexual Activity    Alcohol use: No    Drug use: No    Sexual activity: Defer         ALLERGIES  Butorphanol, Hydroxyzine, Hydroxyzine hcl, Iodinated contrast media, Iodine,  Other, Phenytoin, Amoxicillin, and Clindamycin/lincomycin      REVIEW OF SYSTEMS  Included in HPI  All systems reviewed and negative except for those discussed in HPI.      PHYSICAL EXAM    I have reviewed the triage vital signs and nursing notes.    ED Triage Vitals [11/04/24 1550]   Temp Heart Rate Resp BP SpO2   97.6 °F (36.4 °C) (!) 138 22 -- 97 %      Temp src Heart Rate Source Patient Position BP Location FiO2 (%)   Tympanic Monitor -- -- --       Physical Exam  Constitutional:       General: He is not in acute distress.     Appearance: Normal appearance.   HENT:      Head: Normocephalic and atraumatic.      Nose: Nose normal.      Mouth/Throat:      Mouth: Mucous membranes are moist.   Eyes:      Conjunctiva/sclera: Conjunctivae normal.      Pupils: Pupils are equal, round, and reactive to light.   Cardiovascular:      Rate and Rhythm: Regular rhythm. Tachycardia present.      Pulses: Normal pulses.      Heart sounds: Normal heart sounds.      Comments: Distal pulses intact  Pulmonary:      Effort: Pulmonary effort is normal.      Breath sounds: Normal breath sounds.   Abdominal:      General: There is no distension.   Musculoskeletal:         General: Normal range of motion.      Cervical back: Normal range of motion and neck supple.   Skin:     General: Skin is warm.      Capillary Refill: Capillary refill takes less than 2 seconds.   Neurological:      General: No focal deficit present.      Mental Status: He is alert and oriented to person, place, and time.   Psychiatric:         Mood and Affect: Mood normal.             LAB RESULTS  Recent Results (from the past 24 hours)   ECG 12 Lead ED Triage Standing Order; Chest Pain    Collection Time: 11/04/24  3:56 PM   Result Value Ref Range    QT Interval 322 ms    QTC Interval 440 ms   Comprehensive Metabolic Panel    Collection Time: 11/04/24  4:03 PM    Specimen: Blood   Result Value Ref Range    Glucose 175 (H) 65 - 99 mg/dL    BUN 18 8 - 23 mg/dL     Creatinine 1.58 (H) 0.76 - 1.27 mg/dL    Sodium 146 (H) 136 - 145 mmol/L    Potassium 4.2 3.5 - 5.2 mmol/L    Chloride 109 (H) 98 - 107 mmol/L    CO2 25.5 22.0 - 29.0 mmol/L    Calcium 9.3 8.6 - 10.5 mg/dL    Total Protein 7.3 6.0 - 8.5 g/dL    Albumin 4.6 3.5 - 5.2 g/dL    ALT (SGPT) 17 1 - 41 U/L    AST (SGOT) 18 1 - 40 U/L    Alkaline Phosphatase 118 (H) 39 - 117 U/L    Total Bilirubin 0.5 0.0 - 1.2 mg/dL    Globulin 2.7 gm/dL    A/G Ratio 1.7 g/dL    BUN/Creatinine Ratio 11.4 7.0 - 25.0    Anion Gap 11.5 5.0 - 15.0 mmol/L    eGFR 47.9 (L) >60.0 mL/min/1.73   High Sensitivity Troponin T    Collection Time: 11/04/24  4:03 PM    Specimen: Blood   Result Value Ref Range    HS Troponin T 10 <22 ng/L   Green Top (Gel)    Collection Time: 11/04/24  4:03 PM   Result Value Ref Range    Extra Tube Hold for add-ons.    Lavender Top    Collection Time: 11/04/24  4:03 PM   Result Value Ref Range    Extra Tube hold for add-on    Gold Top - SST    Collection Time: 11/04/24  4:03 PM   Result Value Ref Range    Extra Tube Hold for add-ons.    Light Blue Top    Collection Time: 11/04/24  4:03 PM   Result Value Ref Range    Extra Tube Hold for add-ons.    CBC Auto Differential    Collection Time: 11/04/24  4:03 PM    Specimen: Blood   Result Value Ref Range    WBC 7.92 3.40 - 10.80 10*3/mm3    RBC 4.87 4.14 - 5.80 10*6/mm3    Hemoglobin 15.5 13.0 - 17.7 g/dL    Hematocrit 45.2 37.5 - 51.0 %    MCV 92.8 79.0 - 97.0 fL    MCH 31.8 26.6 - 33.0 pg    MCHC 34.3 31.5 - 35.7 g/dL    RDW 12.3 12.3 - 15.4 %    RDW-SD 42.0 37.0 - 54.0 fl    MPV 8.1 6.0 - 12.0 fL    Platelets 204 140 - 450 10*3/mm3    Neutrophil % 64.4 42.7 - 76.0 %    Lymphocyte % 26.3 19.6 - 45.3 %    Monocyte % 5.3 5.0 - 12.0 %    Eosinophil % 3.4 0.3 - 6.2 %    Basophil % 0.3 0.0 - 1.5 %    Immature Grans % 0.3 0.0 - 0.5 %    Neutrophils, Absolute 5.11 1.70 - 7.00 10*3/mm3    Lymphocytes, Absolute 2.08 0.70 - 3.10 10*3/mm3    Monocytes, Absolute 0.42 0.10 - 0.90 10*3/mm3     Eosinophils, Absolute 0.27 0.00 - 0.40 10*3/mm3    Basophils, Absolute 0.02 0.00 - 0.20 10*3/mm3    Immature Grans, Absolute 0.02 0.00 - 0.05 10*3/mm3    nRBC 0.0 0.0 - 0.2 /100 WBC   D-dimer, Quantitative    Collection Time: 11/04/24  4:03 PM    Specimen: Blood   Result Value Ref Range    D-Dimer, Quantitative 0.42 0.00 - 0.66 MCGFEU/mL   BNP    Collection Time: 11/04/24  4:03 PM    Specimen: Blood   Result Value Ref Range    proBNP <36.0 0.0 - 900.0 pg/mL   High Sensitivity Troponin T 2Hr    Collection Time: 11/04/24  6:06 PM    Specimen: Arm, Left; Blood   Result Value Ref Range    HS Troponin T 9 <22 ng/L    Troponin T Delta -1 >=-4 - <+4 ng/L         RADIOLOGY  XR Chest 1 View    Result Date: 11/4/2024  XR CHEST 1 VW-11/4/2024  HISTORY: Chest pain.  Heart size is within normal limits. Lungs appear free of acute infiltrates. Lower cervical fusion plate is seen. Bones and soft tissues are unremarkable.      1. No acute process.  This report was finalized on 11/4/2024 5:01 PM by Dr. Deangelo Resendiz M.D on Workstation: [id number]         MEDICATIONS GIVEN IN ER  Medications   sodium chloride 0.9 % flush 10 mL (has no administration in time range)   aspirin tablet 325 mg (325 mg Oral Given 11/4/24 1648)   ondansetron (ZOFRAN) injection 4 mg (4 mg Intravenous Given 11/4/24 1640)   morphine injection 4 mg (4 mg Intravenous Given 11/4/24 1640)   morphine injection 4 mg (4 mg Intravenous Given 11/4/24 1826)         ORDERS PLACED DURING THIS VISIT:  Orders Placed This Encounter   Procedures    XR Chest 1 View    [place] Draw    Comprehensive Metabolic Panel    High Sensitivity Troponin T    CBC Auto Differential    D-dimer, Quantitative    BNP    High Sensitivity Troponin T 2Hr    NPO Diet NPO Type: Strict NPO    Undress & Gown    Continuous Pulse Oximetry    Cardiology (on-call MD unless specified)    Oxygen Therapy- Nasal Cannula; Titrate 1-6 LPM Per SpO2; 90 - 95%    ECG 12 Lead ED Triage Standing Order; Chest  Pain    ECG 12 Lead ED Triage Standing Order; Chest Pain    Telemetry Scan    Telemetry Scan    Insert Peripheral IV    Initiate ED Observation Status    CBC & Differential    Green Top (Gel)    Lavender Top    Gold Top - SST    Light Blue Top         OUTPATIENT MEDICATION MANAGEMENT:  Current Facility-Administered Medications Ordered in Epic   Medication Dose Route Frequency Provider Last Rate Last Admin    sodium chloride 0.9 % flush 10 mL  10 mL Intravenous PRN Jonn Burns MD         Current Outpatient Medications Ordered in Epic   Medication Sig Dispense Refill    ALLERGY SERUM SUBLINGUAL DROPS Place  under the tongue Daily.      amitriptyline (ELAVIL) 10 MG tablet Take 1 tablet by mouth Every Night.      aspirin 81 MG EC tablet Take 1 tablet by mouth Daily.      atorvastatin (LIPITOR) 10 MG tablet TAKE 1 TABLET BY MOUTH DAILY 90 tablet 1    coenzyme Q10 100 MG capsule Take 1 capsule by mouth Daily.      EPINEPHrine (EPIPEN) 0.3 MG/0.3ML solution auto-injector injection       escitalopram (LEXAPRO) 20 MG tablet Take 1 tablet by mouth Daily.      famotidine (PEPCID) 20 MG tablet Take 1 tablet by mouth 2 (Two) Times a Day.      fluticasone (FLONASE) 50 MCG/ACT nasal spray 1 spray into each nostril 2 (two) times a day. Administer 2 sprays in each nostril for each dose. (Patient taking differently: 2 sprays into the nostril(s) as directed by provider Daily. Administer 2 sprays in each nostril for each dose.) 3 each 3    gabapentin (NEURONTIN) 600 MG tablet TAKE 2 TABLETS BY MOUTH  TWICE A DAY. MAXIMUM DAILY  AMOUNT: 2400MG      melatonin 5 MG tablet tablet Take 2 tablets by mouth Every Night.      methadone (DOLOPHINE) 5 MG tablet Take 1 tablet by mouth 2 (Two) Times a Day.      Multiple Vitamins-Minerals (MULTI COMPLETE PO) Take  by mouth.      Narcan 4 MG/0.1ML nasal spray       ondansetron (ZOFRAN) 4 MG tablet Take 1 tablet by mouth Every 6 (Six) Hours As Needed for Nausea or Vomiting. Take for nausea  and vomiting.      Probiotic Product (PROBIOTIC-10 PO) Take  by mouth.      Sennosides (SENNA) 8.6 MG capsule Take 1 capsule by mouth Daily.      Synthroid 112 MCG tablet TAKE 1 TABLET BY MOUTH DAILY 90 tablet 1    tamsulosin (FLOMAX) 0.4 MG capsule 24 hr capsule Take 1 capsule by mouth Daily.      tiZANidine (ZANAFLEX) 4 MG tablet Take 1 tablet by mouth Every 6 (Six) Hours As Needed.      Unable to find Domperidone POWD; 1 tablet twice daily.      Unable to find Domperidone 10 mg once daily             PROGRESS, DATA ANALYSIS, CONSULTS, AND MEDICAL DECISION MAKING  All labs have been independently interpreted by me.  All radiology studies have been reviewed by me. All EKG's have been independently viewed and interpreted by me.  Discussion below represents my analysis of pertinent findings related to patient's condition, differential diagnosis, treatment plan and final disposition.    Differential diagnosis includes but is not limited to acute CHF, PE, cardiomyopathy.    Clinical Scores: HEART Score: 4         ED Course as of 11/04/24 1949 Mon Nov 04, 2024   1637 Consult placed to cardiology [MP]   1637 WBC: 7.92 [MP]   1637 Hemoglobin: 15.5 [MP]   1637 BUN: 18 [MP]   1637 Creatinine(!): 1.58 [MP]   1637 HS Troponin T: 10 [MP]   1637 proBNP: <36.0 [MP]   1637 D-Dimer, Quant: 0.42 [MP]   1701 I spoke with Dr. Gutierrez with cardiology.  Reviewed patient presentation and ED findings.  She states PYP scan cannot be done on an inpatient basis.  If troponin is flat patient is cleared from cardiology standpoint. [MP]   1826 Chest x-ray independently interpreted by me as no pneumothorax [MP]   1917 Reassessed the patient.  Reports he is only made minimal improvement on his chest pain.  Offered him admission for observation and cardiology consultation and he is agreeable. [MP]   1945 I spoke with Maritza in the observation unit.  Discussed patient presentation and ED findings.  She agrees to admit patient to an ED observation  bed. [MP]      ED Course User Index  [MP] Corrie Whitmore PA-C             AS OF 19:49 EST VITALS:    BP - 112/76  HR - 86  TEMP - 97.6 °F (36.4 °C) (Tympanic)  O2 SATS - 97%    COMPLEXITY OF CARE  The patient requires admission.      DIAGNOSIS  Final diagnoses:   Chest pain, unspecified type         DISPOSITION  ED Disposition       ED Disposition   Decision to Admit    Condition   --    Comment   --                Please note that portions of this document were completed with a voice recognition program.    Note Disclaimer: At [facility], we believe that sharing information builds trust and better relationships. You are receiving this note because you recently visited [facility]. It is possible you will see health information before a provider has talked with you about it. This kind of information can be easy to misunderstand. To help you fully understand what it means for your health, we urge you to discuss this note with your provider.     Corrie Whitmore PA-C  11/04/24 1949

## 2024-11-04 NOTE — Clinical Note
A 6 fr sheath was successfully inserted with ultrasound guidance into the right radial artery. Sheath insertion not delayed. No

## 2024-11-04 NOTE — ED PROVIDER NOTES
EMERGENCY DEPARTMENT MD ATTESTATION NOTE    Room Number:  07/07  PCP: Cesar Hoover MD  Independent Historians: Patient    HPI:  A complete HPI/ROS/PMH/PSH/SH/FH are unobtainable due to: None    Chronic or social conditions impacting patient care (Social Determinants of Health): None      Context: Joel Keating is a 64 y.o. male with a medical history of CAD, fibromyalgia, gastroparesis, GAURANG, BPH who presents to the ED c/o acute chest pain and shortness of breath that are particularly worse whenever he walks short distances.  He has been working with his cardiologist in the outpatient setting and had a recent echocardiogram study performed last week.  He was advised that he would need a PYP test next.  Today he presents because of the worsening pain in the chest when she describes as an aching or dull pressure that extends across the chest.  He denies any fevers or flulike symptoms.  There has been no new leg swelling.  He has been taking his typical medications including gabapentin for idiopathic neuropathy but is still complaining of some tingling especially in his left arm and left hand which is unusual for him.      Review of prior external notes (non-ED) -and- Review of prior external test results outside of this encounter: I independently reviewed the most recent cardiology office progress note from August 20, 2024.  Assessment included remark of fatigue and shortness of breath that have been worsening over the past few months.  Echocardiogram was ordered at that time.    Prescription drug monitoring program review: SORAYA reviewed by Doug Weiner MD, Akhil Maxwell MD   N/A and SORAYA query complete and reviewed. Patient receives regular prescriptions for controlled substances.      PHYSICAL EXAM    I have reviewed the triage vital signs and nursing notes.    ED Triage Vitals   Temp Heart Rate Resp BP SpO2   11/04/24 1550 11/04/24 1550 11/04/24 1550 11/04/24 1611 11/04/24 1550   97.6 °F (36.4  °C) (!) 138 22 142/79 97 %      Temp src Heart Rate Source Patient Position BP Location FiO2 (%)   11/04/24 1550 11/04/24 1550 -- -- --   Tympanic Monitor          Physical Exam  GENERAL: alert, no acute distress  SKIN: Warm, dry, no diaphoresis  HENT: Normocephalic, atraumatic  EYES: no scleral icterus  CV: regular rhythm, regular rate no murmurs  RESPIRATORY: normal effort, lungs clear no stridor  ABDOMEN: soft, nontender, nondistended  MUSCULOSKELETAL: no deformity, no asymmetry of the lower extremities  NEURO: alert, moves all extremities, follows commands      MEDICATIONS GIVEN IN ER  Medications   sodium chloride 0.9 % flush 10 mL (has no administration in time range)   sodium chloride 0.9 % flush 10 mL (has no administration in time range)   sodium chloride 0.9 % flush 10 mL (has no administration in time range)   sodium chloride 0.9 % infusion 40 mL (has no administration in time range)   ondansetron ODT (ZOFRAN-ODT) disintegrating tablet 4 mg (has no administration in time range)     Or   ondansetron (ZOFRAN) injection 4 mg (has no administration in time range)   nitroglycerin (NITROSTAT) SL tablet 0.4 mg (has no administration in time range)   Potassium Replacement - Follow Nurse / BPA Driven Protocol (has no administration in time range)   Magnesium Standard Dose Replacement - Follow Nurse / BPA Driven Protocol (has no administration in time range)   Phosphorus Replacement - Follow Nurse / BPA Driven Protocol (has no administration in time range)   Calcium Replacement - Follow Nurse / BPA Driven Protocol (has no administration in time range)   acetaminophen (TYLENOL) tablet 650 mg (has no administration in time range)     Or   acetaminophen (TYLENOL) 160 MG/5ML oral solution 650 mg (has no administration in time range)     Or   acetaminophen (TYLENOL) suppository 650 mg (has no administration in time range)   sennosides-docusate (PERICOLACE) 8.6-50 MG per tablet 2 tablet (has no administration in time  range)     And   polyethylene glycol (MIRALAX) packet 17 g (has no administration in time range)     And   bisacodyl (DULCOLAX) EC tablet 5 mg (has no administration in time range)     And   bisacodyl (DULCOLAX) suppository 10 mg (has no administration in time range)   aspirin tablet 325 mg (325 mg Oral Given 11/4/24 1648)   ondansetron (ZOFRAN) injection 4 mg (4 mg Intravenous Given 11/4/24 1640)   morphine injection 4 mg (4 mg Intravenous Given 11/4/24 1640)   morphine injection 4 mg (4 mg Intravenous Given 11/4/24 1826)         ORDERS PLACED DURING THIS VISIT:  Orders Placed This Encounter   Procedures    XR Chest 1 View    [place] Draw    Comprehensive Metabolic Panel    High Sensitivity Troponin T    CBC Auto Differential    D-dimer, Quantitative    BNP    High Sensitivity Troponin T 2Hr    CBC (No Diff)    Basic Metabolic Panel    High Sensitivity Troponin T    NPO Diet NPO Type: Sips with Meds    Diet: Cardiac; Healthy Heart (2-3 Na+); Fluid Consistency: Thin (IDDSI 0)    Undress & Gown    Intake & Output    Weigh Patient    Oral Care    Place Sequential Compression Device    Maintain Sequential Compression Device    Maintain IV Access    Telemetry - Place Orders & Notify Provider of Results When Patient Experiences Acute Chest Pain, Dysrhythmia or Respiratory Distress    May Be Off Telemetry for Tests    Vital Signs    Continuous Pulse Oximetry    Up With Assistance    Code Status and Medical Interventions: CPR (Attempt to Resuscitate); Full Support    Cardiology (on-call MD unless specified)    Inpatient Cardiology Consult    Oxygen Therapy- Nasal Cannula; Titrate 1-6 LPM Per SpO2; 90 - 95%    ECG 12 Lead ED Triage Standing Order; Chest Pain    ECG 12 Lead ED Triage Standing Order; Chest Pain    Telemetry Scan    Telemetry Scan    ECG 12 Lead Chest Pain    Insert Peripheral IV    Insert Peripheral IV    Initiate ED Observation Status    CBC & Differential    Green Top (Gel)    Lavender Top    Gold Top - SST     Light Blue Top         PROCEDURES  Procedures        PROGRESS, DATA ANALYSIS, CONSULTS, AND MEDICAL DECISION MAKING  All labs have been independently interpreted by me.  All radiology studies have been reviewed by me. All EKG's have been independently viewed and interpreted by me.  Discussion below represents my analysis of pertinent findings related to patient's condition, differential diagnosis, treatment plan and final disposition.    Differential diagnosis includes but is not limited to acute MI, unstable angina, pulmonary embolism, congestive heart failure.    Clinical Scores: HEART Score: 4                   ED Course as of 11/1960 Mon Nov 04, 2024   1637 Consult placed to cardiology [MP]   1637 WBC: 7.92 [MP]   1637 Hemoglobin: 15.5 [MP]   1637 BUN: 18 [MP]   1637 Creatinine(!): 1.58 [MP]   1637 HS Troponin T: 10 [MP]   1637 proBNP: <36.0 [MP]   1637 D-Dimer, Quant: 0.42 [MP]   1701 I spoke with Dr. Gutierrez with cardiology.  Reviewed patient presentation and ED findings.  She states PYP scan cannot be done on an inpatient basis.  If troponin is flat patient is cleared from cardiology standpoint. [MP]   1826 Chest x-ray independently interpreted by me as no pneumothorax [MP]   1917 Reassessed the patient.  Reports he is only made minimal improvement on his chest pain.  Offered him admission for observation and cardiology consultation and he is agreeable. [MP]   1945 I spoke with Maritza in the observation unit.  Discussed patient presentation and ED findings.  She agrees to admit patient to an ED observation bed. [MP]      ED Course User Index  [MP] Corrie Whitmore, KOBY       MDM:   EKG         EKG time/Interp time: 1556/1559  Rhythm/Rate: Sinus tachycardia, 112 bpm  P waves and DC: Present, normal interval  QRS, axis: 86 ms, borderline left axis deviation  ST and T waves: No ST segment elevations are present.  No acute ischemic changes notable.  Independently interpreted by me contemporaneously with  treatment    I independently interpreted the Chest X-ray and my findings are: No Pneumothorax, No Effusion, No Infiltrate    I have reviewed the labs available so far.  First troponin is normal range.  proBNP is normal range and therefore I do not suspect acute congestive heart failure.  There is no evidence of effusion or edema on the chest x-ray either.  The D-dimer is normal range and therefore I do not suspect acute pulmonary embolism either.  We will consult with cardiology specialist and reviewed the patient's presentation together and follow their recommendation for disposition planning.    COMPLEXITY OF CARE  The patient requires admission.    Please note that portions of this document were completed with a voice recognition program.    Note Disclaimer: At [facility], we believe that sharing information builds trust and better relationships. You are receiving this note because you recently visited [facility]. It is possible you will see health information before a provider has talked with you about it. This kind of information can be easy to misunderstand. To help you fully understand what it means for your health, we urge you to discuss this note with your provider.       Akhil Maxwell MD  11/1960

## 2024-11-04 NOTE — TELEPHONE ENCOUNTER
Pt is asking to talk with you about his Echo and about him not feeling good . Please advise . Thanks KL

## 2024-11-04 NOTE — ED NOTES
Pt arrived via PV from home; referred by Cardiology for increasing chest pain over the the past few days. Echo performed on Thursday. Pt is Aox4 at time of triage however not tolerating standing. Pt is answering questions appropriately.,

## 2024-11-04 NOTE — Clinical Note
A 4 fr sheath was successfully inserted with ultrasound guidance into the right brachial vein. Sheath insertion not delayed.

## 2024-11-05 LAB
ANION GAP SERPL CALCULATED.3IONS-SCNC: 9.3 MMOL/L (ref 5–15)
BUN SERPL-MCNC: 21 MG/DL (ref 8–23)
BUN/CREAT SERPL: 14 (ref 7–25)
CALCIUM SPEC-SCNC: 8.8 MG/DL (ref 8.6–10.5)
CHLORIDE SERPL-SCNC: 105 MMOL/L (ref 98–107)
CO2 SERPL-SCNC: 25.7 MMOL/L (ref 22–29)
CREAT SERPL-MCNC: 1.5 MG/DL (ref 0.76–1.27)
DEPRECATED RDW RBC AUTO: 43.7 FL (ref 37–54)
EGFRCR SERPLBLD CKD-EPI 2021: 51 ML/MIN/1.73
ERYTHROCYTE [DISTWIDTH] IN BLOOD BY AUTOMATED COUNT: 12.8 % (ref 12.3–15.4)
GLUCOSE SERPL-MCNC: 96 MG/DL (ref 65–99)
HCT VFR BLD AUTO: 39.2 % (ref 37.5–51)
HGB BLD-MCNC: 13.7 G/DL (ref 13–17.7)
MCH RBC QN AUTO: 32.9 PG (ref 26.6–33)
MCHC RBC AUTO-ENTMCNC: 34.9 G/DL (ref 31.5–35.7)
MCV RBC AUTO: 94.2 FL (ref 79–97)
PLATELET # BLD AUTO: 166 10*3/MM3 (ref 140–450)
PMV BLD AUTO: 8.3 FL (ref 6–12)
POTASSIUM SERPL-SCNC: 4.8 MMOL/L (ref 3.5–5.2)
QT INTERVAL: 445 MS
QTC INTERVAL: 449 MS
RBC # BLD AUTO: 4.16 10*6/MM3 (ref 4.14–5.8)
SODIUM SERPL-SCNC: 140 MMOL/L (ref 136–145)
TROPONIN T SERPL HS-MCNC: 11 NG/L
TROPONIN T SERPL HS-MCNC: 13 NG/L
WBC NRBC COR # BLD AUTO: 5.39 10*3/MM3 (ref 3.4–10.8)

## 2024-11-05 PROCEDURE — 93005 ELECTROCARDIOGRAM TRACING: CPT | Performed by: INTERNAL MEDICINE

## 2024-11-05 PROCEDURE — 99214 OFFICE O/P EST MOD 30 MIN: CPT | Performed by: INTERNAL MEDICINE

## 2024-11-05 PROCEDURE — 25010000002 LIDOCAINE 2% SOLUTION: Performed by: INTERNAL MEDICINE

## 2024-11-05 PROCEDURE — G0378 HOSPITAL OBSERVATION PER HR: HCPCS

## 2024-11-05 PROCEDURE — 25010000002 HYDROCORTISONE SOD SUC (PF) 100 MG RECONSTITUTED SOLUTION: Performed by: INTERNAL MEDICINE

## 2024-11-05 PROCEDURE — C1894 INTRO/SHEATH, NON-LASER: HCPCS | Performed by: INTERNAL MEDICINE

## 2024-11-05 PROCEDURE — 80048 BASIC METABOLIC PNL TOTAL CA: CPT

## 2024-11-05 PROCEDURE — 96376 TX/PRO/DX INJ SAME DRUG ADON: CPT

## 2024-11-05 PROCEDURE — 25510000001 IOPAMIDOL PER 1 ML: Performed by: INTERNAL MEDICINE

## 2024-11-05 PROCEDURE — 85027 COMPLETE CBC AUTOMATED: CPT

## 2024-11-05 PROCEDURE — 93005 ELECTROCARDIOGRAM TRACING: CPT

## 2024-11-05 PROCEDURE — 93010 ELECTROCARDIOGRAM REPORT: CPT | Performed by: INTERNAL MEDICINE

## 2024-11-05 PROCEDURE — 84484 ASSAY OF TROPONIN QUANT: CPT | Performed by: INTERNAL MEDICINE

## 2024-11-05 PROCEDURE — 93458 L HRT ARTERY/VENTRICLE ANGIO: CPT | Performed by: INTERNAL MEDICINE

## 2024-11-05 PROCEDURE — 25010000002 MORPHINE PER 10 MG: Performed by: EMERGENCY MEDICINE

## 2024-11-05 PROCEDURE — 84484 ASSAY OF TROPONIN QUANT: CPT

## 2024-11-05 PROCEDURE — 25010000002 DIPHENHYDRAMINE PER 50 MG: Performed by: INTERNAL MEDICINE

## 2024-11-05 PROCEDURE — C1769 GUIDE WIRE: HCPCS | Performed by: INTERNAL MEDICINE

## 2024-11-05 PROCEDURE — 25010000002 HEPARIN (PORCINE) PER 1000 UNITS: Performed by: INTERNAL MEDICINE

## 2024-11-05 RX ORDER — MORPHINE SULFATE 2 MG/ML
1 INJECTION, SOLUTION INTRAMUSCULAR; INTRAVENOUS EVERY 4 HOURS PRN
Status: DISCONTINUED | OUTPATIENT
Start: 2024-11-05 | End: 2024-11-06 | Stop reason: HOSPADM

## 2024-11-05 RX ORDER — ALUMINA, MAGNESIA, AND SIMETHICONE 2400; 2400; 240 MG/30ML; MG/30ML; MG/30ML
15 SUSPENSION ORAL ONCE
Status: COMPLETED | OUTPATIENT
Start: 2024-11-05 | End: 2024-11-05

## 2024-11-05 RX ORDER — SODIUM CHLORIDE 0.9 % (FLUSH) 0.9 %
3 SYRINGE (ML) INJECTION EVERY 12 HOURS SCHEDULED
Status: DISCONTINUED | OUTPATIENT
Start: 2024-11-05 | End: 2024-11-06 | Stop reason: HOSPADM

## 2024-11-05 RX ORDER — SODIUM CHLORIDE 9 MG/ML
100 INJECTION, SOLUTION INTRAVENOUS CONTINUOUS
Status: ACTIVE | OUTPATIENT
Start: 2024-11-05 | End: 2024-11-05

## 2024-11-05 RX ORDER — MORPHINE SULFATE 2 MG/ML
1 INJECTION, SOLUTION INTRAMUSCULAR; INTRAVENOUS EVERY 4 HOURS PRN
Status: DISCONTINUED | OUTPATIENT
Start: 2024-11-05 | End: 2024-11-05

## 2024-11-05 RX ORDER — VERAPAMIL HYDROCHLORIDE 2.5 MG/ML
INJECTION, SOLUTION INTRAVENOUS
Status: DISCONTINUED | OUTPATIENT
Start: 2024-11-05 | End: 2024-11-05 | Stop reason: HOSPADM

## 2024-11-05 RX ORDER — SODIUM CHLORIDE 0.9 % (FLUSH) 0.9 %
10 SYRINGE (ML) INJECTION AS NEEDED
Status: DISCONTINUED | OUTPATIENT
Start: 2024-11-05 | End: 2024-11-06 | Stop reason: HOSPADM

## 2024-11-05 RX ORDER — SODIUM CHLORIDE 9 MG/ML
40 INJECTION, SOLUTION INTRAVENOUS AS NEEDED
Status: DISCONTINUED | OUTPATIENT
Start: 2024-11-05 | End: 2024-11-06 | Stop reason: HOSPADM

## 2024-11-05 RX ORDER — HYDROCODONE BITARTRATE AND ACETAMINOPHEN 5; 325 MG/1; MG/1
1 TABLET ORAL EVERY 4 HOURS PRN
Status: DISCONTINUED | OUTPATIENT
Start: 2024-11-05 | End: 2024-11-06 | Stop reason: HOSPADM

## 2024-11-05 RX ORDER — NALOXONE HCL 0.4 MG/ML
0.4 VIAL (ML) INJECTION
Status: DISCONTINUED | OUTPATIENT
Start: 2024-11-05 | End: 2024-11-06 | Stop reason: HOSPADM

## 2024-11-05 RX ORDER — MORPHINE SULFATE 2 MG/ML
2 INJECTION, SOLUTION INTRAMUSCULAR; INTRAVENOUS ONCE
Status: COMPLETED | OUTPATIENT
Start: 2024-11-05 | End: 2024-11-05

## 2024-11-05 RX ORDER — IOPAMIDOL 755 MG/ML
INJECTION, SOLUTION INTRAVASCULAR
Status: DISCONTINUED | OUTPATIENT
Start: 2024-11-05 | End: 2024-11-05 | Stop reason: HOSPADM

## 2024-11-05 RX ORDER — LIDOCAINE HYDROCHLORIDE 20 MG/ML
INJECTION, SOLUTION INFILTRATION; PERINEURAL
Status: DISCONTINUED | OUTPATIENT
Start: 2024-11-05 | End: 2024-11-05 | Stop reason: HOSPADM

## 2024-11-05 RX ORDER — ACETAMINOPHEN 325 MG/1
650 TABLET ORAL EVERY 4 HOURS PRN
Status: DISCONTINUED | OUTPATIENT
Start: 2024-11-05 | End: 2024-11-06 | Stop reason: HOSPADM

## 2024-11-05 RX ORDER — DIPHENHYDRAMINE HYDROCHLORIDE 50 MG/ML
INJECTION INTRAMUSCULAR; INTRAVENOUS
Status: DISCONTINUED | OUTPATIENT
Start: 2024-11-05 | End: 2024-11-05 | Stop reason: HOSPADM

## 2024-11-05 RX ORDER — HEPARIN SODIUM 1000 [USP'U]/ML
INJECTION, SOLUTION INTRAVENOUS; SUBCUTANEOUS
Status: DISCONTINUED | OUTPATIENT
Start: 2024-11-05 | End: 2024-11-05 | Stop reason: HOSPADM

## 2024-11-05 RX ORDER — NALOXONE HCL 0.4 MG/ML
0.4 VIAL (ML) INJECTION
Status: DISCONTINUED | OUTPATIENT
Start: 2024-11-05 | End: 2024-11-05

## 2024-11-05 RX ADMIN — MORPHINE SULFATE 2 MG: 2 INJECTION, SOLUTION INTRAMUSCULAR; INTRAVENOUS at 14:14

## 2024-11-05 RX ADMIN — TAMSULOSIN HYDROCHLORIDE 0.4 MG: 0.4 CAPSULE ORAL at 09:06

## 2024-11-05 RX ADMIN — TIZANIDINE 4 MG: 4 TABLET ORAL at 00:36

## 2024-11-05 RX ADMIN — ACETAMINOPHEN 325MG 650 MG: 325 TABLET ORAL at 05:49

## 2024-11-05 RX ADMIN — TIZANIDINE 4 MG: 4 TABLET ORAL at 09:06

## 2024-11-05 RX ADMIN — TIZANIDINE 4 MG: 4 TABLET ORAL at 22:16

## 2024-11-05 RX ADMIN — METHADONE HYDROCHLORIDE 5 MG: 5 TABLET ORAL at 12:39

## 2024-11-05 RX ADMIN — FAMOTIDINE 20 MG: 20 TABLET, FILM COATED ORAL at 22:17

## 2024-11-05 RX ADMIN — NITROGLYCERIN 1 INCH: 20 OINTMENT TOPICAL at 12:39

## 2024-11-05 RX ADMIN — LEVOTHYROXINE SODIUM 112 MCG: 112 TABLET ORAL at 09:06

## 2024-11-05 RX ADMIN — Medication 3 ML: at 22:18

## 2024-11-05 RX ADMIN — GABAPENTIN 600 MG: 300 CAPSULE ORAL at 09:06

## 2024-11-05 RX ADMIN — METHADONE HYDROCHLORIDE 5 MG: 5 TABLET ORAL at 01:08

## 2024-11-05 RX ADMIN — GABAPENTIN 600 MG: 300 CAPSULE ORAL at 22:16

## 2024-11-05 RX ADMIN — SENNOSIDES 1 TABLET: 8.6 TABLET, FILM COATED ORAL at 00:36

## 2024-11-05 RX ADMIN — Medication 10 ML: at 14:15

## 2024-11-05 RX ADMIN — NITROGLYCERIN 0.4 MG: 0.4 TABLET SUBLINGUAL at 21:22

## 2024-11-05 RX ADMIN — ALUMINUM HYDROXIDE, MAGNESIUM HYDROXIDE, AND DIMETHICONE 15 ML: 400; 400; 40 SUSPENSION ORAL at 23:16

## 2024-11-05 RX ADMIN — HYDROCODONE BITARTRATE AND ACETAMINOPHEN 1 TABLET: 5; 325 TABLET ORAL at 17:08

## 2024-11-05 RX ADMIN — AMITRIPTYLINE HYDROCHLORIDE 20 MG: 10 TABLET, FILM COATED ORAL at 22:16

## 2024-11-05 RX ADMIN — ESCITALOPRAM 20 MG: 20 TABLET, FILM COATED ORAL at 09:06

## 2024-11-05 RX ADMIN — NITROGLYCERIN 0.4 MG: 0.4 TABLET SUBLINGUAL at 21:27

## 2024-11-05 RX ADMIN — AMITRIPTYLINE HYDROCHLORIDE 20 MG: 10 TABLET, FILM COATED ORAL at 00:36

## 2024-11-05 RX ADMIN — ATORVASTATIN CALCIUM 10 MG: 20 TABLET, FILM COATED ORAL at 09:06

## 2024-11-05 RX ADMIN — METHADONE HYDROCHLORIDE 5 MG: 5 TABLET ORAL at 22:16

## 2024-11-05 RX ADMIN — Medication 10 ML: at 22:17

## 2024-11-05 RX ADMIN — Medication 10 MG: at 22:16

## 2024-11-05 RX ADMIN — FAMOTIDINE 20 MG: 20 TABLET, FILM COATED ORAL at 09:06

## 2024-11-05 RX ADMIN — Medication 10 ML: at 09:09

## 2024-11-05 RX ADMIN — MORPHINE SULFATE 2 MG: 2 INJECTION, SOLUTION INTRAMUSCULAR; INTRAVENOUS at 10:23

## 2024-11-05 NOTE — DISCHARGE INSTRUCTIONS
[facility]  [address] [place] [address]    Coronary Angiogram (Radial/Ulnar Approach) After Care    Refer to this sheet in the next few weeks. These instructions provide you with information on caring for yourself after your procedure. Your caregiver may also give you more specific instructions. Your treatment has been planned according to current medical practices, but problems sometimes occur. Call your caregiver if you have any problems or questions after your procedure.    Home Care Instructions:  You may shower the day after the procedure. Remove the bandage (dressing) and gently wash the site with plain soap and water. Gently pat the site dry. You may apply a band aid daily for 2 days if desired.    Do not apply powder or lotion to the site.  Do not submerge the affected site in water for 3 to 5 days or until the site is completely healed.   Do not lift, push or pull anything over 5 pounds for 5 days after your procedure or as directed by your physician.  As a reference, a gallon of milk weighs 8 pounds.   Inspect the site at least twice daily. You may notice some bruising at the site and it may be tender for 1 to 2 weeks.     Increase your fluid intake for the next 2 days.    Keep arm elevated for 24 hours. For the remainder of the day, keep your arm in “Pledge of Allegiance” position when up and about.     You may drive 24 hours after the procedure unless otherwise instructed by your caregiver.  Do not operate machinery or power tools for 24 hours.  A responsible adult should be with you for the first 24 hours after you arrive home. Do not make any important legal decisions or sign legal papers for 24 hours.  Do not drink alcohol for 24 hours.    Metformin or any medications containing Metformin should not be taken for 48 hours after your procedure.      Call Your Doctor if:   You have unusual pain at the radial/ulnar (wrist) site.  You have redness, warmth, swelling, or pain at the  radial/ulnar (wrist) site.  You have drainage (other than a small amount of blood on the dressing).  `You have chills or a fever > 101.  Your arm becomes pale or dark, cool, tingly, or numb.  You develop chest pain, shortness of breath, feel faint or pass out.    You have heavy bleeding from the site, hold pressure on the site for 20 minutes.  If the bleeding stops, apply a fresh bandage and call your cardiologist.  However, if you        continue to have bleeding, call 911 and continue to apply pressure to the site.   You have any symptoms of a stroke.  Remember BE FAST  B-balance. Sudden trouble walking or loss of balance.  E-eyes.  Sudden changes in how you see or a sudden onset of a very bad headache.   F-face. Sudden weakness or loss of feeling of the face or facial droop on one side.   A-arms Sudden weakness or numbness in one arm.  One arm drifts down if they are both held out in front of you. This happens suddenly and usually on one side of the body.   S-speech.  Sudden trouble speaking, slurred speech or trouble understanding what are saying.   T-time  Time to call emergency services.  Write down the symptoms and the time they started.

## 2024-11-05 NOTE — CONSULTS
[facility] Consult    Patient Name: Joel Keating  Age/Sex: 64 y.o. male  : 1960  MRN: [id number]    Date of Admission: 2024  Date of Encounter Visit: 24  Encounter Provider: Shanta Johnson MD  Referring Provider: Akhil Maxwell MD  Place of Service: [facility] CARDIOLOGY  Patient Care Team:  Cesar Hoover MD as PCP - General (Family Medicine)  Cesar Hoover MD as PCP - Internal Medicine    Subjective:     Consulted for: Chest pain    Chief Complaint: Chest pain, fatigue, shortness of breath    History of Present Illness:  Joel Keating is a 64 y.o. male with nonobstructive coronary artery disease, fibromyalgia, anxiety, hypothyroidism, who presented to the emergency room with worsening chest discomfort, fatigue and shortness of breath.    The patient reports that he has been experiencing the symptoms for about 2 weeks now which have been persistent and worsening specially over this past weekend.  He reports that he just been feeling very fatigued over the last couple weeks.  He also reports constant substernal chest discomfort that seems to worsen with any kind of activity including just standing.  He also reports issues with shortness of breath.  He denies any palpitations, lower extremity swelling, near-syncope or syncope.  He reports ongoing symptoms of neuropathy involving his hands and extending up to his elbows.  In light of his symptoms he called our office and is recommended he go on to the emergency room.    Following his arrival his troponins and EKG have been unremarkable.  Blood pressures were normal on admission.  D-dimer was normal.  proBNP was normal.  CBC was unremarkable.  Creatinine was elevated 1.58 which is improved to 1.50 this morning.  He received morphine with no improvement in his symptoms.  Last night he received a dose of Toradol which she reports helped with the pain although when asked for details he reports  "that his pain went from a \"7/10 to a 6/10\".    Blood pressures were relatively low this morning for unclear reasons.  Not reporting any symptoms related to this at this point.    His prior cardiac workup includes a cardiac catheterization in 10/2022.  This was following an attempted stress test where the patient became disoriented and confused with Lexiscan infusion despite administration of reversal agent.  His coronary angiograms were performed on 10/5/2022 and showed mild to moderate nonobstructive disease at the mid LAD and proximal RCA.  He recently underwent an echocardiogram that originally was ordered and 8/2024.  This was performed on 10/31/2024.  Showed normal left ventricular function and wall motion with an EF of 56.4%, grade 1 diastolic dysfunction, trace mitral valve regurgitation, moderate tricuspid valve regurgitation with an right ventricular systolic pressure 29 mmHg, normal aortic root size, and abnormal strain pattern with apical sparing concerning for infiltrative cardiomyopathy.  Findings were discussed with the patient and a PYP scan was recommended.          Past Medical History:  Past Medical History:   Diagnosis Date    Abdominal pain     Recurrent sx and follows with Dr. Heard.    Abscess of buttock     Acute gastritis     Allergic rhinitis     Alopecia     Angina pectoris     Anxiety     Arteriosclerotic coronary artery disease     Benign prostatic hypertrophy (BPH) with nocturia     Calf tenderness     Cervical neck pain with evidence of disc disease     CSA (central sleep apnea) 09/14/2015    Overnight polysomnogram.  Weight 205 pounds.  AHI severely abnormal at 58 events per hour.  Central sleep apnea index 34 events per hour.  Obstructive sleep apnea index 24 events per hour.  No sleep-related hypoxia.  Auto SV titration performed 11/2/2015.    DDD (degenerative disc disease), lumbar     Degeneration of cervical intervertebral disc     Depression     Esophageal reflux     Follows " with Dr. Jackson    Fatigue     Fever 04/2015    Low grade symptoms of fever for 3 weeks.    Fibromyalgia     Gastroparesis     Health care maintenance     Heart palpitations     History of deep vein thrombophlebitis of lower extremity 2003    DVT Dx 2003    Hypokalemia     Hypothyroidism     Idiopathic peripheral neuropathy     Impaired fasting glucose     Insomnia     Lichen planus     tongue    LOM (loss of memory)     Mental status change     Episodes where he stares ahead and have some shaking of his limbs.  Possible seizure activity.  Referred to Dr. Santana, October 2, 2013.    Nausea     Onychomycosis of toenail     GAURANG (obstructive sleep apnea) 05/08/2007    Overnight polysomnogram.  Weight 247 pounds.  AHI mildly abnormal at 11 events per hour without sleep-related hypoxia noted.    Osteoarthritis of hip     PN (peripheral neuropathy)     Primary hypothyroidism     Rash     Tendonitis of left hip     Tendonitis of right hip     TIA (transient ischemic attack) 06/2016    Tinea versicolor        Past Surgical History:   Procedure Laterality Date    APPENDECTOMY      BACK SURGERY      Cervical    CARDIAC CATHETERIZATION N/A 10/05/2022    Procedure: Left Heart Cath;  Surgeon: Shanta Johnson MD;  Location:  [facility];  Service: Cardiology;  Laterality: N/A;    CARDIAC CATHETERIZATION N/A 10/05/2022    Procedure: Coronary angiography;  Surgeon: Shanta Johnson MD;  Location:  [facility];  Service: Cardiology;  Laterality: N/A;    CHOLECYSTECTOMY      COLONOSCOPY  12/2022    Normal.  Dr. Jackson    JOINT REPLACEMENT Bilateral     Hip    LUMBAR FUSION      SURAL NERVE/MUSCLE BIOPSY  04/2016    TONSILLECTOMY         Home Medications:   Medications Prior to Admission   Medication Sig Dispense Refill Last Dose/Taking    amitriptyline (ELAVIL) 10 MG tablet Take 2 tablets by mouth Every Night.   11/3/2024 Bedtime    aspirin 81 MG EC tablet Take 1 tablet by mouth Daily.   11/4/2024  Morning    atorvastatin (LIPITOR) 10 MG tablet TAKE 1 TABLET BY MOUTH DAILY 90 tablet 1 11/4/2024 Morning    coenzyme Q10 100 MG capsule Take 1 capsule by mouth Daily.   11/4/2024 Morning    EPINEPHrine (EPIPEN) 0.3 MG/0.3ML solution auto-injector injection    Past Month    escitalopram (LEXAPRO) 20 MG tablet Take 1 tablet by mouth Daily.   11/4/2024 Morning    famotidine (PEPCID) 20 MG tablet Take 1 tablet by mouth 2 (Two) Times a Day.   11/4/2024 Morning    fluticasone (FLONASE) 50 MCG/ACT nasal spray 1 spray into each nostril 2 (two) times a day. Administer 2 sprays in each nostril for each dose. (Patient taking differently: Administer 2 sprays into the nostril(s) as directed by provider Daily. Administer 2 sprays in each nostril for each dose.) 3 each 3 11/4/2024 Morning    gabapentin (NEURONTIN) 600 MG tablet TAKE 2 TABLETS BY MOUTH  TWICE A DAY. MAXIMUM DAILY  AMOUNT: 2400MG   11/4/2024 Morning    melatonin 5 MG tablet tablet Take 2 tablets by mouth Every Night.   11/3/2024 Bedtime    methadone (DOLOPHINE) 5 MG tablet Take 1 tablet by mouth 2 (Two) Times a Day.   11/4/2024 Morning    Multiple Vitamins-Minerals (MULTI COMPLETE PO) Take 1 tablet by mouth Daily.   11/4/2024 Morning    Narcan 4 MG/0.1ML nasal spray    Past Month    ondansetron (ZOFRAN) 4 MG tablet Take 1 tablet by mouth Every 6 (Six) Hours As Needed for Nausea or Vomiting. Take for nausea and vomiting.   Past Month    Probiotic Product (PROBIOTIC-10 PO) Take  by mouth.   11/4/2024 Morning    Sennosides (SENNA) 8.6 MG capsule Take 1 capsule by mouth Daily.   11/4/2024 Morning    Synthroid 112 MCG tablet TAKE 1 TABLET BY MOUTH DAILY 90 tablet 1 11/4/2024 Morning    tamsulosin (FLOMAX) 0.4 MG capsule 24 hr capsule Take 1 capsule by mouth Daily.   11/4/2024 Bedtime    tiZANidine (ZANAFLEX) 4 MG tablet Take 1 tablet by mouth Every 6 (Six) Hours As Needed.   11/3/2024 Bedtime    Unable to find Take 1 each by mouth Daily. Domperidone 10 mg once daily    11/4/2024 Morning       Allergies:  Allergies   Allergen Reactions    Butorphanol     Hydroxyzine     Hydroxyzine Hcl GI Intolerance    Iodinated Contrast Media Hives    Iodine     Other      Vistra 650 Tabs    Phenytoin     Amoxicillin Rash    Clindamycin/Lincomycin Rash       Past Social History:  Social History     Socioeconomic History    Marital status:     Number of children: 2   Tobacco Use    Smoking status: Never     Passive exposure: Never    Smokeless tobacco: Never   Vaping Use    Vaping status: Never Used   Substance and Sexual Activity    Alcohol use: No    Drug use: No    Sexual activity: Defer       Past Family History:  Family History   Problem Relation Age of Onset    Heart failure Mother         Congestive    Heart disease Mother     Atrial fibrillation Mother     Alcohol abuse Paternal Uncle     Heart failure Paternal Grandmother         Congestive    No Known Problems Father     No Known Problems Sister     No Known Problems Brother     Heart failure Maternal Grandmother     Heart disease Maternal Grandmother     No Known Problems Maternal Grandfather     No Known Problems Paternal Grandfather        Review of Systems:   All systems reviewed. Pertinent positives identified in HPI. All other systems are negative.    Objective:   Temp:  [97.6 °F (36.4 °C)-98.4 °F (36.9 °C)] 98.4 °F (36.9 °C)  Heart Rate:  [] 81  Resp:  [17-22] 18  BP: ()/(54-84) 88/54     Intake/Output Summary (Last 24 hours) at 11/5/2024 0742  Last data filed at 11/4/2024 2120  Gross per 24 hour   Intake 480 ml   Output --   Net 480 ml     Body mass index is 29.92 kg/m².      11/04/24  1650   Weight: 106 kg (233 lb)     Weight change:     Physical Exam:   General Appearance:    Alert, cooperative, in no acute distress   Head:    Normocephalic, without obvious abnormality, atraumatic   Eyes:            Lids and lashes normal, conjunctivae and sclerae normal, no   icterus, no pallor, corneas clear, PERRLA   Ears:  "   Ears appear intact with no abnormalities noted   Neck:   No adenopathy, supple, trachea midline, no thyromegaly, no   carotid bruit, no JVD   Lungs:     Clear to auscultation,respirations regular, even and unlabored    Heart:    Regular rhythm and normal rate, normal S1 and S2, no murmur, no gallop, no rub, no click   Chest Wall:    No abnormalities observed   Abdomen:     Normal bowel sounds, no masses, no organomegaly, soft        non-tender, non-distended, no guarding, no rebound  tenderness   Extremities:   Moves all extremities well, no edema, no cyanosis, no redness   Pulses:   Pulses palpable and equal bilaterally. Normal radial, carotid, femoral, dorsalis pedis and posterior tibial pulses bilaterally. Normal abdominal aorta   Skin:  Psychiatric:   No bleeding, bruising or rash    Alert and oriented x 3, normal mood and affect       Lab Review:   Results from last 7 days   Lab Units 11/05/24  0558 11/04/24  1603   SODIUM mmol/L 140 146*   POTASSIUM mmol/L 4.8 4.2   CHLORIDE mmol/L 105 109*   CO2 mmol/L 25.7 25.5   BUN mg/dL 21 18   CREATININE mg/dL 1.50* 1.58*   GLUCOSE mg/dL 96 175*   CALCIUM mg/dL 8.8 9.3   AST (SGOT) U/L  --  18   ALT (SGPT) U/L  --  17     Results from last 7 days   Lab Units 11/05/24  0558 11/04/24  1806 11/04/24  1603   HSTROP T ng/L 11 9 10     Results from last 7 days   Lab Units 11/05/24  0558 11/04/24  1603   WBC 10*3/mm3 5.39 7.92   HEMOGLOBIN g/dL 13.7 15.5   HEMATOCRIT % 39.2 45.2   PLATELETS 10*3/mm3 166 204                   Invalid input(s): \"LDLCALC\"  Results from last 7 days   Lab Units 11/04/24  1603   PROBNP pg/mL <36.0           Echo EF Estimated  Lab Results   Component Value Date    ECHOEFEST 58 11/03/2021         Imaging:  Imaging Results (Most Recent)       Procedure Component Value Units Date/Time    XR Chest 1 View [[id number]] Collected: 11/04/24 1700     Updated: 11/04/24 1704    Narrative:      XR CHEST 1 VW-11/4/2024     HISTORY: Chest pain.     Heart size is " within normal limits. Lungs appear free of acute  infiltrates. Lower cervical fusion plate is seen. Bones and soft tissues  are unremarkable.       Impression:      1. No acute process.     This report was finalized on 11/4/2024 5:01 PM by Dr. Deangelo Resendiz M.D on Workstation: [id number]               I personally viewed and interpreted the patient's EKG    Assessment/Plan:     1.  Chest pain.  Atypical sounding symptoms.  Troponins and EKG unremarkable.  However has no nonobstructive disease.  Unfortunately he did not tolerate stress test in the past.  2.  Coronary artery disease.  Nonobstructive disease noted in 2022.  On medical management.  3.  Hypothyroidism  4.  Fibromyalgia  5.  Chronic kidney disease  6.  Hyperlipidemia    - Unfortunately he did not tolerate stress test last time and had a severe reaction including confusion and disorientation that prompted an admission in 2022.  This is a stress test is not an option and due to his ongoing symptoms I think we need to proceed with cardiac catheterization to ensure that his previously nonobstructive disease has not progressed.  Discussed at length with the patient and he agrees to proceed.  -Further recommendations based on the results of cardiac catheterization.    Thank you for allowing me to participate in the care of Joel Keating. Feel free to contact me directly with any further questions or concerns.    Shanta Johnson MD  [facility]  11/05/24  07:42 EST

## 2024-11-05 NOTE — PLAN OF CARE
Goal Outcome Evaluation:  Plan of Care Reviewed With: patient        Progress: improving     Pt continues to report medial chest pressure overnight, improved with toradol. ECG obtained, AM labs unchanged. No new complaints overnight. Provider aware of discomfort, no ECG wanted as the pain has been ongoing for weeks.

## 2024-11-05 NOTE — H&P
[facility]   HISTORY AND PHYSICAL    Patient Name: Joel Keating  : 1960  MRN: [id number]  Primary Care Physician:  Cesar Hoover MD  Date of admission: 2024    Subjective   Subjective     Chief Complaint:   Chief Complaint   Patient presents with    Chest Pain         HPI:    Joel Keating is a 64 y.o. male, with a past medical history including, but not limited to, degenerative disc disease, anxiety, coronary artery disease, BPH, GERD, fibromyalgia, GAURANG, hypothyroidism, and TIA, scented to the emergency department with a complaint of chest pain.  He states that he has had this chest pain off and on for approximately a year.  However it has gotten worse over the weekend and is accompanied by fatigue.  He states that pain is midsternal and nonradiating.  He denies any nausea, vomiting, diaphoresis, fever, chills.   Cardiology has been consulted to see the patient in the AM.    Review of Systems   All systems were reviewed and negative except for: What was mentioned above in the HPI.    Personal History     Past Medical History:   Diagnosis Date    Abdominal pain     Recurrent sx and follows with Dr. Heard.    Abscess of buttock     Acute gastritis     Allergic rhinitis     Alopecia     Angina pectoris     Anxiety     Arteriosclerotic coronary artery disease     Benign prostatic hypertrophy (BPH) with nocturia     Calf tenderness     Cervical neck pain with evidence of disc disease     CSA (central sleep apnea) 2015    Overnight polysomnogram.  Weight 205 pounds.  AHI severely abnormal at 58 events per hour.  Central sleep apnea index 34 events per hour.  Obstructive sleep apnea index 24 events per hour.  No sleep-related hypoxia.  Auto SV titration performed 2015.    DDD (degenerative disc disease), lumbar     Degeneration of cervical intervertebral disc     Depression     Esophageal reflux     Follows with Dr. Jackson    Fatigue     Fever 2015    Low grade symptoms of fever for 3  weeks.    Fibromyalgia     Gastroparesis     Health care maintenance     Heart palpitations     History of deep vein thrombophlebitis of lower extremity 2003    DVT Dx 2003    Hypokalemia     Hypothyroidism     Idiopathic peripheral neuropathy     Impaired fasting glucose     Insomnia     Lichen planus     tongue    LOM (loss of memory)     Mental status change     Episodes where he stares ahead and have some shaking of his limbs.  Possible seizure activity.  Referred to Dr. Santana, October 2, 2013.    Nausea     Onychomycosis of toenail     GAURANG (obstructive sleep apnea) 05/08/2007    Overnight polysomnogram.  Weight 247 pounds.  AHI mildly abnormal at 11 events per hour without sleep-related hypoxia noted.    Osteoarthritis of hip     PN (peripheral neuropathy)     Primary hypothyroidism     Rash     Tendonitis of left hip     Tendonitis of right hip     TIA (transient ischemic attack) 06/2016    Tinea versicolor        Past Surgical History:   Procedure Laterality Date    APPENDECTOMY      BACK SURGERY      Cervical    CARDIAC CATHETERIZATION N/A 10/05/2022    Procedure: Left Heart Cath;  Surgeon: Shanta Johnson MD;  Location:  [facility];  Service: Cardiology;  Laterality: N/A;    CARDIAC CATHETERIZATION N/A 10/05/2022    Procedure: Coronary angiography;  Surgeon: Shanta Johnson MD;  Location:  [facility];  Service: Cardiology;  Laterality: N/A;    CHOLECYSTECTOMY      COLONOSCOPY  12/2022    Normal.  Dr. Jackson    JOINT REPLACEMENT Bilateral     Hip    LUMBAR FUSION      SURAL NERVE/MUSCLE BIOPSY  04/2016    TONSILLECTOMY         Family History: family history includes Alcohol abuse in his paternal uncle; Atrial fibrillation in his mother; Heart disease in his maternal grandmother and mother; Heart failure in his maternal grandmother, mother, and paternal grandmother; No Known Problems in his brother, father, maternal grandfather, paternal grandfather, and sister.  Otherwise pertinent FHx was reviewed and not pertinent to current issue.    Social History:  reports that he has never smoked. He has never been exposed to tobacco smoke. He has never used smokeless tobacco. He reports that he does not drink alcohol and does not use drugs.    Home Medications:  ALLERGY SERUM SUBLINGUAL DROPS, EPINEPHrine, Multiple Vitamins-Minerals, Probiotic Product, Senna, Unable to find, amitriptyline, aspirin, atorvastatin, coenzyme Q10, escitalopram, famotidine, fluticasone, gabapentin, levothyroxine, melatonin, methadone, naloxone, ondansetron, tamsulosin, and tiZANidine    Allergies:  Allergies   Allergen Reactions    Butorphanol     Hydroxyzine     Hydroxyzine Hcl GI Intolerance    Iodinated Contrast Media Hives    Iodine     Other      Vistra 650 Tabs    Phenytoin     Amoxicillin Rash    Clindamycin/Lincomycin Rash       Objective   Objective     Vitals:   Temp:  [97.6 °F (36.4 °C)] 97.6 °F (36.4 °C)  Heart Rate:  [] 86  Resp:  [22] 22  BP: (103-142)/(63-84) 112/76  Physical Exam   Constitutional: Awake, alert   Eyes: PERRLA   HENT: NCAT, mucous membranes moist   Neck: Supple   Respiratory: Clear to auscultation bilaterally, nonlabored respirations    Cardiovascular: regular rate, palpable pedal pulses bilaterally   Gastrointestinal: Positive bowel sounds, soft, nontender, nondistended   Musculoskeletal: No bilateral ankle edema   Psychiatric: Appropriate affect, cooperative   Neurologic: Oriented x 3, speech clear   Skin: No rashes       Result Review    Result Review:  I have personally reviewed the results from the time of this admission to 11/4/2024 20:03 EST and agree with these findings:  [x]  Laboratory list / accordion  []  Microbiology  [x]  Radiology  [x]  EKG/Telemetry   []  Cardiology/Vascular   []  Pathology  [x]  Old records  []  Other:    Initial lab workup in the emergency department shows high-sensitivity troponin of 10, 9, delta -1, sodium 146, chloride 109,  creatinine 1.58, glucose 175 all other lab work is at baseline for the patient.  Chest x-ray shows no acute process.  EKG shows sinus tachycardia, rate 112.      Assessment & Plan   Assessment / Plan     Brief Patient Summary:  Joel Keating is a 64 y.o. male who was admitted to the observation unit for further evaluation and treatment of his chest pain.    Active Hospital Problems:  Active Hospital Problems    Diagnosis     **Chest pain      Plan:     Chest pain   -Cardiac monitoring  -Vital signs every 4 hours   -Cardiology consult   -High-sensitivity troponin 10, 9, delta -1  -EKG-sinus tachycardia, rate 112  -N.p.o. after midnight     Hypothyroidism  -Continue home dose levothyroxine    Fibromyalgia/chronic pain  -Continue home dose methadone, Zanaflex    Chronic kidney disease  -Creatinine 1.58 today, last creatinineWas 1.39 on 1/31/2024    VTE Prophylaxis:  Mechanical VTE prophylaxis orders are present.        CODE STATUS:    Code Status (Patient has no pulse and is not breathing): CPR (Attempt to Resuscitate)  Medical Interventions (Patient has pulse or is breathing): Full Support    Admission Status:  I believe this patient meets observation status.    76 minutes have been spent by [facility] providers in the care of this patient while under observation status.      Appropriate PPE worn during patient encounter.  Hand hygeine performed before and after seeing the patient.      Electronically signed by PEPPER Henry, 11/04/24, 8:03 PM EST.

## 2024-11-05 NOTE — ED NOTES
Nursing report ED to floor  Joel Keating  64 y.o.  male    HPI :  HPI  Stated Reason for Visit: Chest pain x 3 weeks that had gotten gradually worse. Echo performed last Thursday. Referred by cardiology.  History Obtained From: patient    Chief Complaint  Chief Complaint   Patient presents with    Chest Pain       Admitting doctor:   Doug Weiner MD    Admitting diagnosis:   The encounter diagnosis was Chest pain, unspecified type.    Code status:   Current Code Status       Date Active Code Status Order ID Comments User Context       11/4/2024 1953 CPR (Attempt to Resuscitate) [id number]  Maritza Terry APRN ED        Question Answer    Code Status (Patient has no pulse and is not breathing) CPR (Attempt to Resuscitate)    Medical Interventions (Patient has pulse or is breathing) Full Support                    Allergies:   Butorphanol, Hydroxyzine, Hydroxyzine hcl, Iodinated contrast media, Iodine, Other, Phenytoin, Amoxicillin, and Clindamycin/lincomycin    Isolation:   No active isolations    Intake and Output  No intake or output data in the 24 hours ending 11/04/24 2000    Weight:       11/04/24  1650   Weight: 106 kg (233 lb)       Most recent vitals:   Vitals:    11/04/24 1841 11/04/24 1911 11/04/24 1929 11/04/24 1935   BP: 103/63 112/76     Pulse:  86 82 86   Resp:       Temp:       TempSrc:       SpO2:   94% 97%   Weight:       Height:           Active LDAs/IV Access:   Lines, Drains & Airways       Active LDAs       Name Placement date Placement time Site Days    Peripheral IV 06/29/23 1549 Right Antecubital 06/29/23  1549  Antecubital  494    Peripheral IV 11/04/24 1639 Left Antecubital 11/04/24  1639  Antecubital  less than 1                    Labs (abnormal labs have a star):   Labs Reviewed   COMPREHENSIVE METABOLIC PANEL - Abnormal; Notable for the following components:       Result Value    Glucose 175 (*)     Creatinine 1.58 (*)     Sodium 146 (*)     Chloride 109 (*)     Alkaline  "Phosphatase 118 (*)     eGFR 47.9 (*)     All other components within normal limits    Narrative:     GFR Normal >60  Chronic Kidney Disease <60  Kidney Failure <15     TROPONIN - Normal    Narrative:     High Sensitive Troponin T Reference Range:  <14.0 ng/L- Negative Female for AMI  <22.0 ng/L- Negative Male for AMI  >=14 - Abnormal Female indicating possible myocardial injury.  >=22 - Abnormal Male indicating possible myocardial injury.   Clinicians would have to utilize clinical acumen, EKG, Troponin, and serial changes to determine if it is an Acute Myocardial Infarction or myocardial injury due to an underlying chronic condition.        CBC WITH AUTO DIFFERENTIAL - Normal   D-DIMER, QUANTITATIVE - Normal    Narrative:     According to the assay 's published package insert, a normal (<0.50 MCGFEU/mL) D-dimer result in conjunction with a non-high clinical probability assessment, excludes deep vein thrombosis (DVT) and pulmonary embolism (PE) with high sensitivity.    D-dimer values increase with age and this can make VTE exclusion of an older population difficult. To address this, the [employer], based on best available evidence and recent guidelines, recommends that clinicians use age-adjusted D-dimer thresholds in patients greater than 50 years of age with: a) a low probability of PE who do not meet all Pulmonary Embolism Rule Out Criteria, or b) in those with intermediate probability of PE.   The formula for an age-adjusted D-dimer cut-off is \"age/100\".  For example, a 60 year old patient would have an age-adjusted cut-off of 0.60 MCGFEU/mL and an 80 year old 0.80 MCGFEU/mL.   BNP (IN-HOUSE) - Normal    Narrative:     This assay is used as an aid in the diagnosis of individuals suspected of having heart failure. It can be used as an aid in the diagnosis of acute decompensated heart failure (ADHF) in patients presenting with signs and symptoms of ADHF to the emergency department " (ED). In addition, NT-proBNP of <300 pg/mL indicates ADHF is not likely.    Age Range Result Interpretation  NT-proBNP Concentration (pg/mL:      <50             Positive            >450                   Gray                 300-450                    Negative             <300    50-75           Positive            >900                  Gray                300-900                  Negative            <300      >75             Positive            >1800                  Gray                300-1800                  Negative            <300   HIGH SENSITIVITIY TROPONIN T 2HR - Normal    Narrative:     High Sensitive Troponin T Reference Range:  <14.0 ng/L- Negative Female for AMI  <22.0 ng/L- Negative Male for AMI  >=14 - Abnormal Female indicating possible myocardial injury.  >=22 - Abnormal Male indicating possible myocardial injury.   Clinicians would have to utilize clinical acumen, EKG, Troponin, and serial changes to determine if it is an Acute Myocardial Infarction or myocardial injury due to an underlying chronic condition.        RAINBOW DRAW    Narrative:     The following orders were created for panel order [place] Draw.  Procedure                               Abnormality         Status                     ---------                               -----------         ------                     Green Top (Gel)[[id number]]                                  Final result               Lavender Top[[id number]]                                     Final result               Gold Top - SST[[id number]]                                   Final result               Light Blue Top[[id number]]                                   Final result                 Please view results for these tests on the individual orders.   CBC AND DIFFERENTIAL    Narrative:     The following orders were created for panel order CBC & Differential.  Procedure                               Abnormality         Status                     ---------                                -----------         ------                     CBC Auto Differential[[id number]]        Normal              Final result                 Please view results for these tests on the individual orders.   GREEN TOP   LAVENDER TOP   GOLD TOP - SST   LIGHT BLUE TOP       EKG:   ECG 12 Lead ED Triage Standing Order; Chest Pain   Final Result   HEART BOTJ=028  bpm   RR Ptehnwcv=578  ms   NE Bfsmtlsv=492  ms   P Horizontal Axis=11  deg   P Front Axis=21  deg   QRSD Interval=86  ms   QT Wxcidwpf=299  ms   EBuX=826  ms   QRS Axis=-51  deg   T Wave Axis=64  deg   - ABNORMAL ECG -   Sinus tachycardia   Inferior  infarct, old   No change from previous tracing   Electronically Signed By: Guilherme HallIRA) ([facility]) 2024-11-04 18:42:56   Date and Time of Study:2024-11-04 15:56:55      Telemetry Scan   Final Result      Telemetry Scan   Final Result          Meds given in ED:   Medications   sodium chloride 0.9 % flush 10 mL (has no administration in time range)   sodium chloride 0.9 % flush 10 mL (has no administration in time range)   sodium chloride 0.9 % flush 10 mL (has no administration in time range)   sodium chloride 0.9 % infusion 40 mL (has no administration in time range)   ondansetron ODT (ZOFRAN-ODT) disintegrating tablet 4 mg (has no administration in time range)     Or   ondansetron (ZOFRAN) injection 4 mg (has no administration in time range)   nitroglycerin (NITROSTAT) SL tablet 0.4 mg (has no administration in time range)   Potassium Replacement - Follow Nurse / BPA Driven Protocol (has no administration in time range)   Magnesium Standard Dose Replacement - Follow Nurse / BPA Driven Protocol (has no administration in time range)   Phosphorus Replacement - Follow Nurse / BPA Driven Protocol (has no administration in time range)   Calcium Replacement - Follow Nurse / BPA Driven Protocol (has no administration in time range)   acetaminophen (TYLENOL) tablet 650 mg (has no administration in time  range)     Or   acetaminophen (TYLENOL) 160 MG/5ML oral solution 650 mg (has no administration in time range)     Or   acetaminophen (TYLENOL) suppository 650 mg (has no administration in time range)   sennosides-docusate (PERICOLACE) 8.6-50 MG per tablet 2 tablet (has no administration in time range)     And   polyethylene glycol (MIRALAX) packet 17 g (has no administration in time range)     And   bisacodyl (DULCOLAX) EC tablet 5 mg (has no administration in time range)     And   bisacodyl (DULCOLAX) suppository 10 mg (has no administration in time range)   aspirin tablet 325 mg (325 mg Oral Given 11/4/24 1648)   ondansetron (ZOFRAN) injection 4 mg (4 mg Intravenous Given 11/4/24 1640)   morphine injection 4 mg (4 mg Intravenous Given 11/4/24 1640)   morphine injection 4 mg (4 mg Intravenous Given 11/4/24 1826)       Imaging results:  XR Chest 1 View    Result Date: 11/4/2024  1. No acute process.  This report was finalized on 11/4/2024 5:01 PM by Dr. Deangelo Resendiz M.D on Workstation: [id number]       Ambulatory status:   - up ad thompson    Social issues:   Social History     Socioeconomic History    Marital status:     Number of children: 2   Tobacco Use    Smoking status: Never     Passive exposure: Never    Smokeless tobacco: Never   Vaping Use    Vaping status: Never Used   Substance and Sexual Activity    Alcohol use: No    Drug use: No    Sexual activity: Defer       Peripheral Neurovascular  Peripheral Neurovascular (Adult)  Peripheral Neurovascular WDL: .WDL except, neurovascular assessment upper, neurovascular assessment lower  LUE Neurovascular Assessment  Temperature LUE: warm  Color LUE: no discoloration  Sensation LUE: tingling present  RUE Neurovascular Assessment  Temperature RUE: warm  Color RUE: no discoloration  Sensation RUE: no tingling  LLE Neurovascular Assessment  Temperature LLE: warm  Color LLE: no discoloration  Sensation LLE: tingling present  RLE Neurovascular  Assessment  Temperature RLE: warm  Color RLE: no discoloration  Sensation RLE: no tingling    Neuro Cognitive  Neuro Cognitive (Adult)  Cognitive/Neuro/Behavioral WDL: WDL, level of consciousness, orientation  Level of Consciousness: Alert  Orientation: oriented x 4    Learning  Learning Assessment  Learning Readiness and Ability: no barriers identified  Education Provided  Person Taught: patient, family member/friend  Teaching Method: verbal instruction  Teaching Focus: symptom/problem overview, risk factors/triggers, diagnostic test, medical device/equipment use, medication administration  Education Outcome Evaluation: acceptance expressed, verbalizes understanding    Respiratory  Respiratory WDL  Respiratory WDL: .WDL except, all  Rhythm/Pattern, Respiratory: (S) shortness of breath (exertional dyspnea)    Abdominal Pain       Pain Assessments  Pain (Adult)  (0-10) Pain Rating: Rest: 6.5  Pain Location: chest  Pain Side/Orientation: medial  Pain Description: pressure  Pain Radiation to: arm, left  Pain Management Interventions: pain medication given  Response to Pain Interventions: intervention not effective per patient    NIH Stroke Scale       Viola Suarez RN  11/04/24 20:00 EST

## 2024-11-05 NOTE — PERIOPERATIVE NURSING NOTE
This nurse at bedside asking patient , looked at significant other and asked what his birthday was. Could say where is was and what he had done. Dr. Estrada aware of situation and partner states that he gets this way at times. Pt states that his throat feels like it is closing up, O2 sats at 98%-100%. Dr. Estrada at bedside and aware of it, states he will admit patient and put orders in.

## 2024-11-05 NOTE — PROGRESS NOTES
Brief cardiology progress note:  Plans were to discharge after heart catheterization today.  After the heart catheterization patient was concerned about his contrast dye allergy.  He was pretreated for this.  Did not have any overt signs of anaphylaxis or allergic reaction.  Prior reaction was hives.  He states that he feels like his throat is closing and although no wheezing stridor or difficulty breathing.  Additionally he states that he feels weird.  He did get Benadryl during the case.  He was concerned about going home I share those concerns think we can monitor him overnight make sure there is no late complications of a contrast reaction which again would be low given the minimal amount of dye and the fact that he was pretreated prior to his heart catheterization with Solu-Cortef and IV Benadryl.  He was treated similarly prior to his heart catheterization 2 years ago without any late complications.      Chriss Estrada MD  [facility]  11/05/24  18:13 EST

## 2024-11-05 NOTE — CASE MANAGEMENT/SOCIAL WORK
Discharge Planning Assessment  [facility]     Patient Name: Joel Keating  MRN: [id number]  Today's Date: 11/5/2024    Admit Date: 11/4/2024    Plan: Home with family support   Discharge Needs Assessment       Row Name 11/05/24 1418       Living Environment    People in Home spouse    Name(s) of People in Home Carmela Keating spouse [phone]    Current Living Arrangements home    Potentially Unsafe Housing Conditions none    In the past 12 months has the electric, gas, oil, or water company threatened to shut off services in your home? No    Primary Care Provided by self    Provides Primary Care For no one    Family Caregiver if Needed spouse    Family Caregiver Names Carmela Keating spouse [phone]    Quality of Family Relationships supportive    Able to Return to Prior Arrangements yes       Resource/Environmental Concerns    Resource/Environmental Concerns none    Transportation Concerns none       Transportation Needs    In the past 12 months, has lack of transportation kept you from medical appointments or from getting medications? no    In the past 12 months, has lack of transportation kept you from meetings, work, or from getting things needed for daily living? No       Food Insecurity    Within the past 12 months, you worried that your food would run out before you got the money to buy more. Never true    Within the past 12 months, the food you bought just didn't last and you didn't have money to get more. Never true       Transition Planning    Patient/Family Anticipates Transition to home with family    Patient/Family Anticipated Services at Transition none    Transportation Anticipated family or friend will provide       Discharge Needs Assessment    Readmission Within the Last 30 Days no previous admission in last 30 days    Equipment Currently Used at Home none    Concerns to be Addressed discharge planning    Do you want help finding or keeping work or a job? I do not need or want help    Do you want help  with school or training? For example, starting or completing job training or getting a high school diploma, GED or equivalent No    Anticipated Changes Related to Illness none    Equipment Needed After Discharge none    Provided Post Acute Provider List? N/A    N/A Provider List Comment No needs identified at this time.    Provided Post Acute Provider Quality & Resource List? N/A    Offered/Gave Vendor List no                   Discharge Plan       Row Name 11/05/24 1428       Plan    Plan Home with family support    Patient/Family in Agreement with Plan yes    Plan Comments MOON 11/4/2024. Met with patient and wife at bedside. Patient granted me permission to speak with him while wife remained in the room. Face sheet verified. Prior to admission patient was able to perform his own ADL’s. He denies having any special or adaptive equipment. Patient has sleep apnea but is not able to tolerate. Patient uses the [facility] on [address] and [address], denies any issues affording or remembering to take his medications. Patient will use [facility] at discharge. Patient does not have POA or living will but states Carmela Keating spouse [phone] knows his wishes. Discharge plan is to return home with wife.  Wife to provide transportation home. Will continue to monitor for new or changing discharge needs Gloria CURIEL RN CCP                  Continued Care and Services - Admitted Since 11/4/2024    No active coordination exists for this encounter.       Expected Discharge Date and Time       Expected Discharge Date Expected Discharge Time    Nov 6, 2024            Demographic Summary       Row Name 11/05/24 1417       General Information    Admission Type observation    Arrived From emergency department    Required Notices Provided Observation Status Notice    Referral Source admission list    Reason for Consult discharge planning    Preferred Language English       Contact Information    Permission Granted to Share Info With  family/designee  Carmela Keating spouse [phone]                   Functional Status       Row Name 11/05/24 1417       Functional Status    Usual Activity Tolerance good    Current Activity Tolerance good       Functional Status, IADL    Medications independent    Meal Preparation independent    Housekeeping independent    Laundry independent    Shopping independent    If for any reason you need help with day-to-day activities such as bathing, preparing meals, shopping, managing finances, etc., do you get the help you need? I don't need any help       Mental Status    General Appearance WDL WDL       Mental Status Summary    Recent Changes in Mental Status/Cognitive Functioning no changes       Employment/    Employment Status disabled                   Psychosocial    No documentation.                  Abuse/Neglect    No documentation.                  Legal    No documentation.                  Substance Abuse    No documentation.                  Patient Forms    No documentation.                     Gloria Riggs RN

## 2024-11-05 NOTE — PROGRESS NOTES
Patient Care Team:  Cesar Hoover MD as PCP - General (Family Medicine)  Cesar Hoover MD as PCP - Internal Medicine     ROXIE JAY H&P Note    I supervised care provided by the midlevel provider. We have discussed this patient's history, physical exam, and treatment plan. I have reviewed the midlevel provider's note and I agree with the midlevel provider's findings and plan of care.   SHARED VISIT: This visit was performed by BOTH a physician and an APC. The substantive portion of the medical decision making was performed by this attesting physician who made or approved the management plan and takes responsibility for patient management.   I have personally had a face to face encounter with the patient.   My personal findings are documented below:    History:  64-year-old male with history of coronary artery disease, TIA, presents with chest pain.  EKG nonischemic, high-sensitivity troponin negative, chest x-ray unremarkable.  Patient tachycardic, dimer obtained, dimer negative.  ED lab work significant for creatinine 1.58 and glucose 175.    Physical Exam:  General: No acute distress.  HENT: NCAT, PERRL, Nares patent.  Eyes: no scleral icterus.  Neck: trachea midline, no ROM limitations.  CV: regular rhythm, regular rate.  Respiratory: normal effort, CTAB.  Abdomen: soft, nondistended, NTTP, no rebound tenderness, no guarding or rigidity.  Musculoskeletal: no deformity.  Neuro: alert, moves all extremities, follows commands.  Skin: warm, dry.    Assessment and Plan:  Patient admitted to observation unit, cardiology consulted, trending troponin, monitoring, n.p.o. after midnight.  Cardiology evaluated and plans for cardiac cath today.  Care being transferred to cardiology.

## 2024-11-05 NOTE — PROGRESS NOTES
ED OBSERVATION PROGRESS/DISCHARGE SUMMARY    Date of Admission: 11/4/2024   LOS: 0 days   PCP: Cesar Hoover MD          Subjective     Hospital Outcome:   64-year-old male with PMH including but not limited to CAD, fibromyalgia, gastroparesis and GAURANG was seen and examined at bedside for admission to observation secondary to chest pain and shortness of breath.  Currently established with cardiology and is scheduled for PYP test in the near future.  Patient reports that his pain has progressively worsened therefore presented to the ED.  Laboratory evaluation shows sodium 146/140, creatinine at baseline 1.5 and unremarkable CBC.  Chest x-ray shows no evidence of active disease and EKG nonischemic.    No acute event overnight however patient complains of diffuse anterior chest pain this morning.  Cardiology evaluated patient and plans for left heart cath this afternoon.    ROS:  General: no fevers, chills  Respiratory: no cough, dyspnea  Cardiovascular: no chest pain, palpitations  Abdomen: No abdominal pain, nausea, vomiting, or diarrhea  Neurologic: No focal weakness    Objective   Physical Exam:  I have reviewed the vital signs.  Temp:  [97.6 °F (36.4 °C)-98.4 °F (36.9 °C)] 98.4 °F (36.9 °C)  Heart Rate:  [] 81  Resp:  [17-22] 18  BP: ()/(54-84) 88/54  General Appearance:    Alert, cooperative, no distress  Head:    Normocephalic, atraumatic  Eyes:    Sclerae anicteric  Neck:   Supple, no mass  Lungs: Clear to auscultation bilaterally, respirations unlabored  Heart: Regular rate and rhythm, S1 and S2 normal, no murmur, rub or gallop  Abdomen:  Soft, nontender, bowel sounds active, nondistended  Extremities: No clubbing, cyanosis, or edema to lower extremities  Pulses:  2+ and symmetric in distal lower extremities  Skin: No rashes   Neurologic: Oriented x3, Normal strength to extremities    Results Review:    I have reviewed the labs, radiology results and diagnostic studies.    Results from last 7  days   Lab Units 11/05/24  0558   WBC 10*3/mm3 5.39   HEMOGLOBIN g/dL 13.7   HEMATOCRIT % 39.2   PLATELETS 10*3/mm3 166     Results from last 7 days   Lab Units 11/05/24  0558 11/04/24  1603   SODIUM mmol/L 140 146*   POTASSIUM mmol/L 4.8 4.2   CHLORIDE mmol/L 105 109*   CO2 mmol/L 25.7 25.5   BUN mg/dL 21 18   CREATININE mg/dL 1.50* 1.58*   CALCIUM mg/dL 8.8 9.3   BILIRUBIN mg/dL  --  0.5   ALK PHOS U/L  --  118*   ALT (SGPT) U/L  --  17   AST (SGOT) U/L  --  18   GLUCOSE mg/dL 96 175*     Imaging Results (Last 24 Hours)       Procedure Component Value Units Date/Time    XR Chest 1 View [[id number]] Collected: 11/04/24 1700     Updated: 11/04/24 1704    Narrative:      XR CHEST 1 VW-11/4/2024     HISTORY: Chest pain.     Heart size is within normal limits. Lungs appear free of acute  infiltrates. Lower cervical fusion plate is seen. Bones and soft tissues  are unremarkable.       Impression:      1. No acute process.     This report was finalized on 11/4/2024 5:01 PM by Dr. Deangelo Resendiz M.D on Workstation: [id number]               I have reviewed the medications.     Discharge Medications        ASK your doctor about these medications        Instructions Start Date   amitriptyline 10 MG tablet  Commonly known as: ELAVIL   20 mg, Nightly      aspirin 81 MG EC tablet   81 mg, Oral, Daily      atorvastatin 10 MG tablet  Commonly known as: LIPITOR   10 mg, Oral, Daily      coenzyme Q10 100 MG capsule   100 mg, Daily      EPINEPHrine 0.3 MG/0.3ML solution auto-injector injection  Commonly known as: EPIPEN   No dose, route, or frequency recorded.      escitalopram 20 MG tablet  Commonly known as: LEXAPRO   1 tablet, Daily      famotidine 20 MG tablet  Commonly known as: PEPCID   20 mg, 2 Times Daily      fluticasone 50 MCG/ACT nasal spray  Commonly known as: FLONASE   1 spray, Nasal, 2 Times Daily, Administer 2 sprays in each nostril for each dose.       gabapentin 600 MG tablet  Commonly known as: NEURONTIN    TAKE 2 TABLETS BY MOUTH  TWICE A DAY. MAXIMUM DAILY  AMOUNT: 2400MG      melatonin 5 MG tablet tablet   10 mg, Nightly      methadone 5 MG tablet  Commonly known as: DOLOPHINE   5 mg, 2 Times Daily      MULTI COMPLETE PO   1 tablet, Daily      Narcan 4 MG/0.1ML nasal spray  Generic drug: naloxone   No dose, route, or frequency recorded.      ondansetron 4 MG tablet  Commonly known as: ZOFRAN   4 mg, Every 6 Hours PRN      PROBIOTIC-10 PO   Take  by mouth.      Senna 8.6 MG capsule   1 capsule, Daily      Synthroid 112 MCG tablet  Generic drug: levothyroxine   112 mcg, Oral, Daily      tamsulosin 0.4 MG capsule 24 hr capsule  Commonly known as: FLOMAX   1 capsule, Daily      tiZANidine 4 MG tablet  Commonly known as: ZANAFLEX   4 mg, Every 6 Hours PRN      Unable to find   1 each, Daily              ---------------------------------------------------------------------------------------------  Assessment & Plan   Assessment/Problem List    Chest pain      Plan:    Chest pain   -Cardiac monitoring  -Vital signs every 4 hours   -Cardiology consult   -High-sensitivity troponin 10, 9, delta -1  -EKG-sinus tachycardia, rate 112  -N.p.o. after midnight      Hypothyroidism  -Continue home dose levothyroxine     Fibromyalgia/chronic pain  -Continue home dose methadone, Zanaflex     Chronic kidney disease  -Creatinine 1.58 today, last creatinineWas 1.39 on 1/31/2024        This note will serve as a progress note    Wilfredo Nathan, APRN 11/05/24 08:05 EST    I have worn appropriate PPE during this patient encounter, sanitized my hands both with entering and exiting patient's room.      38 minutes has been spent by [facility] providers in the care of this patient while under observation status

## 2024-11-06 ENCOUNTER — TELEPHONE (OUTPATIENT)
Age: 64
End: 2024-11-06

## 2024-11-06 ENCOUNTER — READMISSION MANAGEMENT (OUTPATIENT)
Dept: CALL CENTER | Facility: HOSPITAL | Age: 64
End: 2024-11-06
Payer: MEDICARE

## 2024-11-06 ENCOUNTER — TELEPHONE (OUTPATIENT)
Dept: CARDIOLOGY | Facility: CLINIC | Age: 64
End: 2024-11-06

## 2024-11-06 VITALS
HEART RATE: 67 BPM | RESPIRATION RATE: 18 BRPM | HEIGHT: 74 IN | OXYGEN SATURATION: 95 % | SYSTOLIC BLOOD PRESSURE: 115 MMHG | TEMPERATURE: 98.6 F | DIASTOLIC BLOOD PRESSURE: 64 MMHG | BODY MASS INDEX: 29.9 KG/M2 | WEIGHT: 233 LBS

## 2024-11-06 LAB
ANION GAP SERPL CALCULATED.3IONS-SCNC: 9.6 MMOL/L (ref 5–15)
BUN SERPL-MCNC: 18 MG/DL (ref 8–23)
BUN/CREAT SERPL: 13.4 (ref 7–25)
CALCIUM SPEC-SCNC: 8.8 MG/DL (ref 8.6–10.5)
CHLORIDE SERPL-SCNC: 104 MMOL/L (ref 98–107)
CO2 SERPL-SCNC: 22.4 MMOL/L (ref 22–29)
CREAT SERPL-MCNC: 1.34 MG/DL (ref 0.76–1.27)
EGFRCR SERPLBLD CKD-EPI 2021: 58.4 ML/MIN/1.73
GEN 5 2HR TROPONIN T REFLEX: 11 NG/L
GLUCOSE SERPL-MCNC: 166 MG/DL (ref 65–99)
POTASSIUM SERPL-SCNC: 4.1 MMOL/L (ref 3.5–5.2)
QT INTERVAL: 377 MS
QTC INTERVAL: 464 MS
SODIUM SERPL-SCNC: 136 MMOL/L (ref 136–145)
TROPONIN T DELTA: -2 NG/L

## 2024-11-06 PROCEDURE — 84484 ASSAY OF TROPONIN QUANT: CPT | Performed by: INTERNAL MEDICINE

## 2024-11-06 PROCEDURE — 25010000002 KETOROLAC TROMETHAMINE PER 15 MG: Performed by: INTERNAL MEDICINE

## 2024-11-06 PROCEDURE — G0378 HOSPITAL OBSERVATION PER HR: HCPCS

## 2024-11-06 PROCEDURE — 94760 N-INVAS EAR/PLS OXIMETRY 1: CPT

## 2024-11-06 PROCEDURE — 80048 BASIC METABOLIC PNL TOTAL CA: CPT | Performed by: INTERNAL MEDICINE

## 2024-11-06 PROCEDURE — 94799 UNLISTED PULMONARY SVC/PX: CPT

## 2024-11-06 PROCEDURE — 25010000002 MORPHINE PER 10 MG: Performed by: INTERNAL MEDICINE

## 2024-11-06 PROCEDURE — 99239 HOSP IP/OBS DSCHRG MGMT >30: CPT | Performed by: INTERNAL MEDICINE

## 2024-11-06 RX ORDER — KETOROLAC TROMETHAMINE 30 MG/ML
30 INJECTION, SOLUTION INTRAMUSCULAR; INTRAVENOUS ONCE
Status: COMPLETED | OUTPATIENT
Start: 2024-11-06 | End: 2024-11-06

## 2024-11-06 RX ORDER — DIPHENHYDRAMINE HYDROCHLORIDE 50 MG/ML
INJECTION INTRAMUSCULAR; INTRAVENOUS
Status: DISCONTINUED | OUTPATIENT
Start: 2024-11-05 | End: 2024-11-06 | Stop reason: HOSPADM

## 2024-11-06 RX ADMIN — METHADONE HYDROCHLORIDE 5 MG: 5 TABLET ORAL at 08:38

## 2024-11-06 RX ADMIN — Medication 3 ML: at 08:42

## 2024-11-06 RX ADMIN — FAMOTIDINE 20 MG: 20 TABLET, FILM COATED ORAL at 08:38

## 2024-11-06 RX ADMIN — ESCITALOPRAM 20 MG: 20 TABLET, FILM COATED ORAL at 08:39

## 2024-11-06 RX ADMIN — MORPHINE SULFATE 1 MG: 2 INJECTION, SOLUTION INTRAMUSCULAR; INTRAVENOUS at 04:21

## 2024-11-06 RX ADMIN — MORPHINE SULFATE 1 MG: 2 INJECTION, SOLUTION INTRAMUSCULAR; INTRAVENOUS at 00:04

## 2024-11-06 RX ADMIN — TAMSULOSIN HYDROCHLORIDE 0.4 MG: 0.4 CAPSULE ORAL at 08:38

## 2024-11-06 RX ADMIN — TIZANIDINE 4 MG: 4 TABLET ORAL at 08:39

## 2024-11-06 RX ADMIN — Medication 10 ML: at 08:42

## 2024-11-06 RX ADMIN — GABAPENTIN 600 MG: 300 CAPSULE ORAL at 08:38

## 2024-11-06 RX ADMIN — ATORVASTATIN CALCIUM 10 MG: 20 TABLET, FILM COATED ORAL at 08:40

## 2024-11-06 RX ADMIN — LEVOTHYROXINE SODIUM 112 MCG: 112 TABLET ORAL at 08:39

## 2024-11-06 RX ADMIN — ASPIRIN 81 MG: 81 TABLET, COATED ORAL at 08:38

## 2024-11-06 RX ADMIN — KETOROLAC TROMETHAMINE 30 MG: 30 INJECTION, SOLUTION INTRAMUSCULAR at 09:35

## 2024-11-06 NOTE — OUTREACH NOTE
Prep Survey      Flowsheet Row Responses   [facility] facility patient discharged from? [facility]   Is LACE score < 7 ? Yes   Eligibility [facility]   Date of Admission 11/04/24   Date of Discharge 11/06/24   Discharge Disposition Home or Self Care   Discharge diagnosis Chest pain-left heart cath this visit   Does the patient have one of the following disease processes/diagnoses(primary or secondary)? Other   Does the patient have Home health ordered? No   Is there a DME ordered? No   Prep survey completed? Yes            AKASH JOHNSON - Registered Nurse

## 2024-11-06 NOTE — DISCHARGE SUMMARY
[facility] Discharge    Patient Name: Joel Keating  Age/Sex: 64 y.o. male  : 1960  MRN: [id number]    Encounter Provider: Shanta Johnson MD  Referring Provider: Akhil Maxwell MD  Place of Service: [facility] CARDIOLOGY  Patient Care Team:  Cesar Hoover MD as PCP - General (Family Medicine)  Cesar Hoover MD as PCP - Internal Medicine         Date of Discharge:  2024     Date of Admit: 2024    Discharge Condition: Stable    Discharge Diagnosis:  1.  Noncardiac chest pain  2.  Stable mild nonobstructive coronary artery disease  3.  Hypothyroidism  4.  Chronic kidney disease  5.  Hyperlipidemia      Hospital Course:   Joel Keating is a 64 y.o. male with nonobstructive coronary artery disease, fibromyalgia, anxiety, hypothyroidism, who presented to the emergency room with worsening chest discomfort, fatigue and shortness of breath.     The patient reports that he has been experiencing the symptoms for about 2 weeks now which have been persistent and worsening specially over this past weekend.  He reports that he just been feeling very fatigued over the last couple weeks.  He also reports constant substernal chest discomfort that seems to worsen with any kind of activity including just standing.  He also reports issues with shortness of breath.  He denies any palpitations, lower extremity swelling, near-syncope or syncope.  He reports ongoing symptoms of neuropathy involving his hands and extending up to his elbows.  In light of his symptoms he called our office and is recommended he go on to the emergency room.     Following his arrival his troponins and EKG have been unremarkable.  Blood pressures were normal on admission.  D-dimer was normal.  proBNP was normal.  CBC was unremarkable.  Creatinine was elevated 1.58 which is improved to 1.50 this morning.  He received morphine with no improvement in his symptoms.  Last night he received a  "dose of Toradol which she reports helped with the pain although when asked for details he reports that his pain went from a \"7/10 to a 6/10\".    His prior cardiac workup includes a cardiac catheterization in 10/2022. This was following an attempted stress test where the patient became disoriented and confused with Lexiscan infusion despite administration of reversal agent. His coronary angiograms were performed on 10/5/2022 and showed mild to moderate nonobstructive disease at the mid LAD and proximal RCA. He recently underwent an echocardiogram that originally was ordered and 8/2024. This was performed on 10/31/2024. Showed normal left ventricular function and wall motion with an EF of 56.4%, grade 1 diastolic dysfunction, trace mitral valve regurgitation, moderate tricuspid valve regurgitation with an right ventricular systolic pressure 29 mmHg, normal aortic root size, and abnormal strain pattern with apical sparing concerning for infiltrative cardiomyopathy. Findings were discussed with the patient and a PYP scan was recommended.     I recommended proceeding with a coronary angiograms due to his ongoing chest pain and his inability to tolerate a stress test.  This was performed on 11/6/2024 and showed stable mild nonobstructive disease involving the LAD and right coronary arteries.  Despite pretreatment prior to his cardiac catheterization he began complaining of throat closing I just feeling strange.  This was different from his prior reported allergic reaction to contrast dye which was hives.      The patient was kept overnight.  He did well but continued to complain of chest pain.  On the day of discharge I gave him another dose of Toradol which helped with the discomfort although was persistent.  Recommended follow-up with his pain management doctors regarding further pain management.    He will be discharged home today.  The patient will follow-up as scheduled on 11/26/2024.    Objective:  Temp:  [98 °F " "(36.7 °C)-98.7 °F (37.1 °C)] 98.6 °F (37 °C)  Heart Rate:  [] 67  Resp:  [10-18] 18  BP: ()/(50-96) 115/64    Intake/Output Summary (Last 24 hours) at 11/6/2024 1259  Last data filed at 11/6/2024 0815  Gross per 24 hour   Intake 600 ml   Output 300 ml   Net 300 ml     Body mass index is 29.92 kg/m².      11/04/24  1650   Weight: 106 kg (233 lb)     Weight change:       Physical Exam:   General Appearance:    No acute distress, alert and oriented x4   Lungs:     Clear to auscultation bilaterally     Heart:    Regular rhythm and normal rate.  No murmurs, gallops, or       rubs.   Abdomen:     Soft, non-tender, non-distended.    Extremities:   2+ right radial pulse, moves all extremities well.  No clubbing, cyanosis, or edema.        Procedures Performed  Procedure(s):  Left Heart Cath  Coronary angiography       Consults:  Consults       Date and Time Order Name Status Description    11/4/2024  7:53 PM Inpatient Cardiology Consult Completed     11/4/2024  4:37 PM Cardiology (on-call MD unless specified)              Pertinent Test Results:  Results from last 7 days   Lab Units 11/06/24 0357 11/05/24 0558 11/04/24  1603   SODIUM mmol/L 136 140 146*   POTASSIUM mmol/L 4.1 4.8 4.2   CHLORIDE mmol/L 104 105 109*   CO2 mmol/L 22.4 25.7 25.5   BUN mg/dL 18 21 18   CREATININE mg/dL 1.34* 1.50* 1.58*   GLUCOSE mg/dL 166* 96 175*   CALCIUM mg/dL 8.8 8.8 9.3   AST (SGOT) U/L  --   --  18   ALT (SGPT) U/L  --   --  17     Results from last 7 days   Lab Units 11/06/24 0357 11/05/24 2203 11/05/24 0558 11/04/24  1806 11/04/24  1603   HSTROP T ng/L 11 13 11 9 10     Results from last 7 days   Lab Units 11/05/24 0558 11/04/24  1603   WBC 10*3/mm3 5.39 7.92   HEMOGLOBIN g/dL 13.7 15.5   HEMATOCRIT % 39.2 45.2   PLATELETS 10*3/mm3 166 204                   Invalid input(s): \"LDLCALC\"  Results from last 7 days   Lab Units 11/04/24  1603   PROBNP pg/mL <36.0           Discharge Medications     Discharge Medications    "     Continue These Medications        Instructions Start Date   amitriptyline 10 MG tablet  Commonly known as: ELAVIL   20 mg, Nightly      aspirin 81 MG EC tablet   81 mg, Oral, Daily      atorvastatin 10 MG tablet  Commonly known as: LIPITOR   10 mg, Oral, Daily      coenzyme Q10 100 MG capsule   100 mg, Daily      EPINEPHrine 0.3 MG/0.3ML solution auto-injector injection  Commonly known as: EPIPEN   No dose, route, or frequency recorded.      escitalopram 20 MG tablet  Commonly known as: LEXAPRO   1 tablet, Daily      famotidine 20 MG tablet  Commonly known as: PEPCID   20 mg, 2 Times Daily      fluticasone 50 MCG/ACT nasal spray  Commonly known as: FLONASE   1 spray, Nasal, 2 Times Daily, Administer 2 sprays in each nostril for each dose.       gabapentin 600 MG tablet  Commonly known as: NEURONTIN   TAKE 2 TABLETS BY MOUTH  TWICE A DAY. MAXIMUM DAILY  AMOUNT: 2400MG      melatonin 5 MG tablet tablet   10 mg, Nightly      methadone 5 MG tablet  Commonly known as: DOLOPHINE   5 mg, 2 Times Daily      MULTI COMPLETE PO   1 tablet, Daily      Narcan 4 MG/0.1ML nasal spray  Generic drug: naloxone   No dose, route, or frequency recorded.      ondansetron 4 MG tablet  Commonly known as: ZOFRAN   4 mg, Every 6 Hours PRN      PROBIOTIC-10 PO   Take  by mouth.      Senna 8.6 MG capsule   1 capsule, Daily      Synthroid 112 MCG tablet  Generic drug: levothyroxine   112 mcg, Oral, Daily      tamsulosin 0.4 MG capsule 24 hr capsule  Commonly known as: FLOMAX   1 capsule, Daily      tiZANidine 4 MG tablet  Commonly known as: ZANAFLEX   4 mg, Every 6 Hours PRN      Unable to find   1 each, Daily               Discharge Diet:    Dietary Orders (From admission, onward)       Start     Ordered    11/05/24 1995  Diet: Regular/House; Fluid Consistency: Thin (IDDSI 0)  Diet Effective Now        References:    Diet Order Crosswalk   Question Answer Comment   Diets: Regular/House    Fluid Consistency: Thin (IDDSI 0)        11/05/24  2335                    Activity at Discharge:   as instructed    Discharge disposition: home     Discharge Instructions and Follow ups:  Future Appointments   Date Time Provider Department Center   11/21/2024 11:00 AM [facility] JONI   11/26/2024 10:00 AM Shanta Johnson MD [facility] CD [id number] None   1/24/2025 11:00 AM [facility] EN  JONI   1/31/2025 11:00 AM Alfonso Trevizo MD [facility] EN  JONI     Additional Instructions for the Follow-ups that You Need to Schedule       Discharge Follow-up with Specified Provider: Keep follow up with Dr. Johnson on 11/26/2024.   As directed      To: Keep follow up with Dr. Johnson on 11/26/2024.               Follow-up Information       Cesar Hoover MD .    Specialty: Family Medicine  Contact information:  [address]  [address]  [place] [address]  [phone]               Chriss Estrada MD Follow up.    Specialty: Cardiology  Why: perfomed cath today, call with questions or concerns  Contact information:  [address]  [address]  [place] [address]  [phone]                              Shanta Johnson MD  11/06/24  12:59 EST    Time: Discharge 40 min

## 2024-11-06 NOTE — TELEPHONE ENCOUNTER
[facility] REACHED OUT TO SCHEDULED FOLLOW UP FOR PATIENT    NO SCHEDULING TIMEFRAME LISTED    PATIENT DOES HAS AN APPT WITH  ON 11.26.24    PLEASE CONTACT THE PATIENT TO SCHEDULE SOONER APPT IF NEEDED, OR ADVISE 11.26 APPT IS OKAY

## 2024-11-06 NOTE — PLAN OF CARE
Goal Outcome Evaluation:  Plan of Care Reviewed With: patient        Progress: no change  Outcome Evaluation: S/P heart cath, R radial site. TR band removed, site soft, clean, dry, intact. Pt c/o CP not treated by nitrostat, NSR on EKG, some soft pressures, all other VSS. on RA. Cardiology aware. Possible discharge. Continue plan of care.

## 2024-11-07 ENCOUNTER — TRANSITIONAL CARE MANAGEMENT TELEPHONE ENCOUNTER (OUTPATIENT)
Dept: CALL CENTER | Facility: HOSPITAL | Age: 64
End: 2024-11-07
Payer: MEDICARE

## 2024-11-07 NOTE — OUTREACH NOTE
Call Center TCM Note      Flowsheet Row Responses   [facility] patient discharged from? [place]   Does the patient have one of the following disease processes/diagnoses(primary or secondary)? Other   TCM attempt successful? Yes   Call start time 1034   Call end time 1036   Discharge diagnosis Chest pain-left heart cath this visit   Meds reviewed with patient/caregiver? Yes   Does the patient have all medications ordered at discharge? N/A   Is the patient taking all medications as directed (includes completed medication regime)? Yes   Comments HOSP DC FU appt 11/11/24 11 am   Does the patient have an appointment with their PCP within 7-14 days of discharge? Yes   Has home health visited the patient within 72 hours of discharge? N/A   Psychosocial issues? No   Did the patient receive a copy of their discharge instructions? Yes   Nursing interventions Reviewed instructions with patient   What is the patient's perception of their health status since discharge? Improving   Is the patient/caregiver able to teach back signs and symptoms related to disease process for when to call PCP? Yes   Is the patient/caregiver able to teach back signs and symptoms related to disease process for when to call 911? Yes   Is the patient/caregiver able to teach back the hierarchy of who to call/visit for symptoms/problems? PCP, Specialist, Home health nurse, Urgent Care, ED, 911 Yes   TCM call completed? Yes   Wrap up additional comments Pt reports a little improvement. Pt has FU appts scheduled. Denies any issues with cath site.   Call end time 1036            Cordelia Adrian RN    11/7/2024, 10:36 EST

## 2024-11-11 ENCOUNTER — TELEPHONE (OUTPATIENT)
Dept: INTERNAL MEDICINE | Facility: CLINIC | Age: 64
End: 2024-11-11

## 2024-11-11 NOTE — TELEPHONE ENCOUNTER
DELETE AFTER REVIEWING: Send this encounter to the appropriate pool. See your Call Action Grid or Workflows for direction.        Hub staff attempted to follow warm transfer process and was unsuccessful     Caller: Carmela Keating    Relationship to patient: Emergency Contact    Best call back number:     [phone] (Mobile)       Patient is needing: TO R/S HOSPITAL FOLLOW UP

## 2024-11-15 ENCOUNTER — OFFICE VISIT (OUTPATIENT)
Dept: INTERNAL MEDICINE | Facility: CLINIC | Age: 64
End: 2024-11-15
Payer: MEDICARE

## 2024-11-15 VITALS
SYSTOLIC BLOOD PRESSURE: 118 MMHG | WEIGHT: 237.5 LBS | TEMPERATURE: 96.6 F | HEART RATE: 72 BPM | HEIGHT: 74 IN | OXYGEN SATURATION: 94 % | BODY MASS INDEX: 30.48 KG/M2 | DIASTOLIC BLOOD PRESSURE: 80 MMHG

## 2024-11-15 DIAGNOSIS — J30.9 ALLERGIC RHINITIS, UNSPECIFIED SEASONALITY, UNSPECIFIED TRIGGER: ICD-10-CM

## 2024-11-15 DIAGNOSIS — Z23 NEED FOR INFLUENZA VACCINATION: Primary | ICD-10-CM

## 2024-11-15 DIAGNOSIS — R53.82 CHRONIC FATIGUE: ICD-10-CM

## 2024-11-15 DIAGNOSIS — E03.9 ACQUIRED HYPOTHYROIDISM: ICD-10-CM

## 2024-11-15 DIAGNOSIS — R53.1 WEAKNESS: ICD-10-CM

## 2024-11-15 DIAGNOSIS — N28.9 RENAL INSUFFICIENCY: ICD-10-CM

## 2024-11-15 DIAGNOSIS — F33.1 MODERATE EPISODE OF RECURRENT MAJOR DEPRESSIVE DISORDER: ICD-10-CM

## 2024-11-15 DIAGNOSIS — R07.9 CHEST PAIN, UNSPECIFIED TYPE: ICD-10-CM

## 2024-11-15 DIAGNOSIS — G89.4 CHRONIC PAIN SYNDROME: ICD-10-CM

## 2024-11-15 DIAGNOSIS — M79.7 FIBROMYALGIA: ICD-10-CM

## 2024-11-15 DIAGNOSIS — F41.9 ANXIETY: ICD-10-CM

## 2024-11-15 NOTE — PROGRESS NOTES
"Chief Complaint  Hospital Follow Up Visit (Chest pain)    Subjective        Joel Keating presents to [facility]  History of Present Illness  Patient appointment for after hospitalization admit date 11 4 discharge date 11 6 reason for admission chest pain shortness of breath  Patient underwent cardiac cath11/6/2024 and showed stable mild nonobstructive disease involving the LAD and right coronary arteries. Despite pretreatment prior to his cardiac catheterization he began complaining of throat closing I just feeling strange. This was different from his prior reported allergic reaction to contrast dye which was hives. Current outpatient and discharge medications have been reconciled for the patient.  Reviewed by: Cesar Hoover MD   He is going to undergo a amyloid testing of his heart  Is followed by pain management cannot take anti-inflammatories due to chronic renal insufficiency as well as distant history of gastric ulcer  He does not exercise because there is too much pain    Objective   Vital Signs:  /80   Pulse 72   Temp 96.6 °F (35.9 °C) (Temporal)   Ht 188 cm (74.02\")   Wt 108 kg (237 lb 8 oz)   SpO2 94%   BMI 30.48 kg/m²   Estimated body mass index is 30.48 kg/m² as calculated from the following:    Height as of this encounter: 188 cm (74.02\").    Weight as of this encounter: 108 kg (237 lb 8 oz).    BMI is >= 30 and <35. (Class 1 Obesity). The following options were offered after discussion;: weight loss educational material (shared in after visit summary) and exercise counseling/recommendations      Physical Exam  Vitals and nursing note reviewed.   Constitutional:       Appearance: Normal appearance. He is normal weight.   HENT:      Head: Normocephalic and atraumatic.      Right Ear: Ear canal normal.      Left Ear: Tympanic membrane, ear canal and external ear normal.      Ears:      Comments: Lower level TM right no erythema or opacity     Nose: Congestion " present.      Mouth/Throat:      Pharynx: No posterior oropharyngeal erythema.   Eyes:      General: No scleral icterus.  Cardiovascular:      Rate and Rhythm: Normal rate.      Pulses: Normal pulses.   Pulmonary:      Effort: Pulmonary effort is normal.   Musculoskeletal:      Right lower leg: No edema.      Left lower leg: No edema.   Lymphadenopathy:      Cervical: No cervical adenopathy.   Skin:     Findings: No erythema.   Neurological:      Mental Status: He is alert. Mental status is at baseline.   Psychiatric:         Mood and Affect: Mood normal.         Behavior: Behavior normal.         Thought Content: Thought content normal.         Judgment: Judgment normal.        Result Review :    Common labs          11/4/2024    16:03 11/5/2024    05:58 11/6/2024    03:57   Common Labs   Glucose 175  96  166    BUN 18  21  18    Creatinine 1.58  1.50  1.34    Sodium 146  140  136    Potassium 4.2  4.8  4.1    Chloride 109  105  104    Calcium 9.3  8.8  8.8    Albumin 4.6      Total Bilirubin 0.5      Alkaline Phosphatase 118      AST (SGOT) 18      ALT (SGPT) 17      WBC 7.92  5.39     Hemoglobin 15.5  13.7     Hematocrit 45.2  39.2     Platelets 204  166       Data reviewed : Cardiology studies cardiac cath no obstructive disease           Assessment and Plan   Diagnoses and all orders for this visit:    1. Need for influenza vaccination (Primary)  -     Fluzone High-Dose 65+yrs (3118-2063)    2. Allergic rhinitis, unspecified seasonality, unspecified trigger    3. Chest pain, unspecified type    4. Acquired hypothyroidism    5. Renal insufficiency    6. Anxiety    7. Moderate episode of recurrent major depressive disorder    8. Fibromyalgia    9. Chronic pain syndrome    10. Chronic fatigue    11. Weakness    Chronic problems stable no change in medications  Recommend increase physical activity  Patient is following up with cardiology results regarding amyloid  Rhinitis short-term trial of 12-hour decongestant  "take once a day in the morning for no longer than 10 days     This patient has a PCP that is the continuing focal point for all health care services, and the patient sees this physician to be evaluated for chest pain. The inherent complexity that this code () captures is not in the clinical condition itself-- chest pain --but rather the cognitition of the continued responsibility of being the focal point for all needed services for this patient.\"   I spent 30 minutes caring for Joel on this date of service. This time includes time spent by me in the following activities:preparing for the visit, reviewing tests, obtaining and/or reviewing a separately obtained history, performing a medically appropriate examination and/or evaluation , counseling and educating the patient/family/caregiver, documenting information in the medical record, and independently interpreting results and communicating that information with the patient/family/caregiver  Follow Up   Return if symptoms worsen or fail to improve, for Recheck.  Patient was given instructions and counseling regarding his condition or for health maintenance advice. Please see specific information pulled into the AVS if appropriate.             "

## 2024-11-19 ENCOUNTER — TELEPHONE (OUTPATIENT)
Dept: CARDIOLOGY | Facility: CLINIC | Age: 64
End: 2024-11-19
Payer: MEDICARE

## 2024-11-19 NOTE — TELEPHONE ENCOUNTER
Notified pt of recommendations from Dr. Johnson. Pt is going to do the PYP test on 11-21-24.    Thank you,    Alayna Cooper, RN  Triage [facility]  11/19/24 13:20 EST

## 2024-11-19 NOTE — TELEPHONE ENCOUNTER
Dr. Johnson,    Pt called this afternoon. He said he spoke with you and Dr. Hoover. Both of you have said you don't think he has cardiac amyloidosis. So, he is asking if there is really any reason to do a PYP test this week. He wants to know if it's even necessary.    Any recommendations?    Thank you,    Alayna Cooper, RN  Triage [facility]  11/19/24 13:10 EST

## 2024-11-19 NOTE — TELEPHONE ENCOUNTER
As I told him before I do not think it would hurt to rule it out further.  However if he is hesitant to proceed he can go ahead and cancel and we can discuss it later at his follow-up.

## 2024-11-21 ENCOUNTER — HOSPITAL ENCOUNTER (OUTPATIENT)
Dept: CARDIOLOGY | Facility: HOSPITAL | Age: 64
Discharge: HOME | End: 2024-11-21
Payer: MEDICARE

## 2024-11-21 VITALS — BODY MASS INDEX: 30.56 KG/M2 | WEIGHT: 238.1 LBS | HEIGHT: 74 IN

## 2024-11-21 DIAGNOSIS — E85.9 AMYLOIDOSIS, UNSPECIFIED TYPE: ICD-10-CM

## 2024-11-21 PROCEDURE — A9538 TC99M PYROPHOSPHATE: HCPCS | Performed by: NURSE PRACTITIONER

## 2024-11-21 PROCEDURE — 78803 RP LOCLZJ TUM SPECT 1 AREA: CPT

## 2024-11-21 PROCEDURE — 34310000005 TECHNETIUM TC99M PYROPHOSPHATE: Performed by: NURSE PRACTITIONER

## 2024-11-21 RX ADMIN — TECHNETIUM TC99M PYROPHOSPHATE 1 DOSE: 12 INJECTION INTRAVENOUS at 11:07

## 2024-11-25 NOTE — PROGRESS NOTES
Subjective:     Encounter Date:11/26/2024      Patient ID: Joel Keating is a 64 y.o. male.    Chief Complaint:  History of Present Illness    This is a 64-year-old with nonobstructive coronary artery disease, fibromyalgia, anxiety, hypothyroidism, who presents for follow-up.      An office visit with me in 9/2022 he reported symptoms of worsening fatigue and ongoing episodes of chest pressure.  Recommended proceeding with a stress test and an echocardiogram.  He underwent testing on 10/5/2022.  His echocardiogram was unremarkable except for mildly elevated dilated aorta.  During the stress portion of the stress test he received Lexiscan infusion and began developing chest pressure radiating to his left arm and jaw.  He was given aminophylline to reverse his symptoms but they continued and actually developed a brief syncopal episode per the staff.  He was sent to the emergency room where he appeared to be confused.  Following his arrival to the emergency room his troponins were unremarkable and EKG unchanged.  Confusion resolved.  Chest pressure continued despite administration of nitroglycerin, IV fentanyl, and IV morphine.  Based on his ongoing symptoms I recommended proceeding with cardiac catheterization which was performed the same day.  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  He was observed overnight.  A D-dimer was checked and was normal.  He was discharged home the following day.     He was tried on isosorbide mononitrate but developed a significant headache.  He saw PEPPER Neal in follow-up on 10/19/2022.  He continued to complain of shortness of breath and dizziness.  He was still having episodes of chest pressure although this was little bit better.   Calcium channel blocker was offered due to the finding of a myocardial bridge but the patient wanted to hold off.  He was referred to pulmonary regarding his shortness of breath.     He was evaluated by pulmonary in 2023 who recommended proceeding with high-resolution CT scan to evaluate for fibrosis.  They also discussed repeating a sleep study since he had a history of moderate to severe sleep apnea but did not tolerate CPAP in the past.  Patient indicated he lost 40 pounds since then and did not want to pursue another sleep study.      In 8/2024 he reported ongoing issues with fatigue that he felt like was worse after he started the atorvastatin at my recommendation 6 months prior.  We discussed holding the medication with the patient was okay with continuing.  He returned to 9/2024 without a landings, APRN.  He reported over the summer that he developed such severe fatigue that he was in bed for 6 weeks.  He underwent an echocardiogram following that office visit which showed normal left ventricular function and wall motion with an EF 56%, grade 1 diastolic dysfunction, moderate tricuspid valve regurgitation, normal right ventricular systolic pressure.  There was concerns due to abnormal strain that he may have amyloid.  He was set up for a PYP scan.    Before he could undergo the PYP scan he was admitted to the hospital in 11/2020 for with worsening chest discomfort, fatigue and shortness of breath.  His cardiac workup initially was unremarkable.  We ultimately opted to proceed with cardiac catheterization since prior stress test he did not do well with due to Lexiscan reaction.  This was performed 11/6/2024 and showed mild nonobstructive disease involving the LAD and right coronary artery.  Unfortunately following the cardiac catheterization he developed a reaction presumably to the contrast dye despite the fact that he was pretreated.  Following day he was continuing to have chest pain.  He was given a dose  of Toradol which seemed to relieve the symptoms.  He was eventually discharged.      He underwent a PYP scan on 11/21/2024 and this showed no evidence of ATTR amyloid.    He reports that the chest discomfort is little bit better now mild.  He reports his fatigue, palpitations, shortness of breath is all unchanged.  He reports intermittent lower extremity swelling especially if he is on his feet for long periods of time which is unchanged.  He reports that his wife is currently in the hospital with colitis so he does have some increased stress from this.  He is wondering if he should consider further evaluation at the [facility].     Prior History:  Patient was previously followed by Dr. Ventura.  He began seeing Dr. Ventura in 2014 when he presented to the hospital with symptoms of unstable angina.  He underwent a cardiac catheterization at that time that showed mild nonobstructive coronary artery disease and small myocardial bridge of the LAD.  Since then he has followed up with Dr. Ventura on a yearly basis.  He was last seen by Dr. Ventura in 7/2017 at which time he appeared to be stable.  He was last seen in the office by ELIAS Huynh and 8/2019 at which time he appeared to be stable from a cardiac standpoint.     Exam initially in 9/2020 when he presented for follow-up.  He reported at that office visit that he had began seeing Dr. Mckenna with infectious disease in 12/2019 for daily mild fevers.  His work-up included an echocardiogram which was performed on 3/2020 which showed normal left ventricular systolic function, EF of 60%, grade 1 diastolic dysfunction, no significant valvular disease.  At the time of his office visit he reported episodes of daily chest pressure radiating to his jaw and left shoulder and associated with diaphoresis.  Symptoms do not extremely occur with exertion.  He also reported an episode of syncope while raising from a seated position.  Their office visit recommended proceeding with further  work-up with a stress test and a ZIO monitor.  ZIO monitor was unremarkable.  Her stress test showed no evidence of ischemia.     Patient was not seen back in follow-up until 10/2021 when he was evaluated by JULIANN Veloz  Who presented to the office at that time with complaints of a syncopal episode without any preceding symptoms.  His ZIO monitor and echocardiogram were recommended at that time.  His ZIO monitor was unremarkable.  His echocardiogram continues showed normal left ventricular systolic function wall motion with EF of 58%, grade 1 diastolic dysfunction, no significant valvular disease, and borderline dilatation of his aorta with an aortic root of 4 cm.  He also had lab work performed at that office visit including a normal D-dimer.    Review of Systems   Constitutional: Positive for malaise/fatigue.   HENT:  Negative for hearing loss, hoarse voice, nosebleeds and sore throat.    Eyes:  Negative for pain.   Cardiovascular:  Positive for chest pain, dyspnea on exertion and palpitations. Negative for claudication, cyanosis, irregular heartbeat, leg swelling, near-syncope, orthopnea, paroxysmal nocturnal dyspnea and syncope.   Respiratory:  Negative for shortness of breath and snoring.    Endocrine: Negative for cold intolerance, heat intolerance, polydipsia, polyphagia and polyuria.   Skin:  Negative for itching and rash.   Musculoskeletal:  Negative for arthritis, falls, joint pain, joint swelling, muscle cramps, muscle weakness and myalgias.   Gastrointestinal:  Negative for constipation, diarrhea, dysphagia, heartburn, hematemesis, hematochezia, melena, nausea and vomiting.   Genitourinary:  Negative for frequency, hematuria and hesitancy.   Neurological:  Negative for excessive daytime sleepiness, dizziness, headaches, light-headedness, numbness and weakness.   Psychiatric/Behavioral:  Negative for depression. The patient is not nervous/anxious.          Current Outpatient Medications:      amitriptyline (ELAVIL) 10 MG tablet, Take 2 tablets by mouth Every Night., Disp: , Rfl:     aspirin 81 MG EC tablet, Take 1 tablet by mouth Daily., Disp: , Rfl:     atorvastatin (LIPITOR) 10 MG tablet, TAKE 1 TABLET BY MOUTH DAILY, Disp: 90 tablet, Rfl: 1    coenzyme Q10 100 MG capsule, Take 1 capsule by mouth Daily., Disp: , Rfl:     EPINEPHrine (EPIPEN) 0.3 MG/0.3ML solution auto-injector injection, , Disp: , Rfl:     escitalopram (LEXAPRO) 20 MG tablet, Take 1 tablet by mouth Daily., Disp: , Rfl:     famotidine (PEPCID) 20 MG tablet, Take 1 tablet by mouth 2 (Two) Times a Day., Disp: , Rfl:     fluticasone (FLONASE) 50 MCG/ACT nasal spray, 1 spray into each nostril 2 (two) times a day. Administer 2 sprays in each nostril for each dose. (Patient taking differently: Administer 2 sprays into the nostril(s) as directed by provider Daily. Administer 2 sprays in each nostril for each dose.), Disp: 3 each, Rfl: 3    gabapentin (NEURONTIN) 600 MG tablet, TAKE 2 TABLETS BY MOUTH  TWICE A DAY. MAXIMUM DAILY  AMOUNT: 2400MG, Disp: , Rfl:     melatonin 5 MG tablet tablet, Take 2 tablets by mouth Every Night., Disp: , Rfl:     methadone (DOLOPHINE) 5 MG tablet, Take 1 tablet by mouth 2 (Two) Times a Day., Disp: , Rfl:     Multiple Vitamins-Minerals (MULTI COMPLETE PO), Take 1 tablet by mouth Daily., Disp: , Rfl:     Narcan 4 MG/0.1ML nasal spray, , Disp: , Rfl:     ondansetron (ZOFRAN) 4 MG tablet, Take 1 tablet by mouth Every 6 (Six) Hours As Needed for Nausea or Vomiting. Take for nausea and vomiting., Disp: , Rfl:     Probiotic Product (PROBIOTIC-10 PO), Take  by mouth., Disp: , Rfl:     Sennosides (SENNA) 8.6 MG capsule, Take 1 capsule by mouth Daily., Disp: , Rfl:     Synthroid 112 MCG tablet, TAKE 1 TABLET BY MOUTH DAILY, Disp: 90 tablet, Rfl: 1    tamsulosin (FLOMAX) 0.4 MG capsule 24 hr capsule, Take 1 capsule by mouth Daily., Disp: , Rfl:     tiZANidine (ZANAFLEX) 4 MG tablet, Take 1 tablet by mouth Every 6 (Six)  Hours As Needed., Disp: , Rfl:     Unable to find, Take 1 each by mouth Daily. Domperidone 10 mg once daily, Disp: , Rfl:     Past Medical History:   Diagnosis Date    Abdominal pain     Recurrent sx and follows with Dr. Heard.    Abscess of buttock     Acute gastritis     Allergic rhinitis     Alopecia     Angina pectoris     Anxiety     Arteriosclerotic coronary artery disease     Benign prostatic hypertrophy (BPH) with nocturia     Calf tenderness     Cervical neck pain with evidence of disc disease     CSA (central sleep apnea) 09/14/2015    Overnight polysomnogram.  Weight 205 pounds.  AHI severely abnormal at 58 events per hour.  Central sleep apnea index 34 events per hour.  Obstructive sleep apnea index 24 events per hour.  No sleep-related hypoxia.  Auto SV titration performed 11/2/2015.    DDD (degenerative disc disease), lumbar     Degeneration of cervical intervertebral disc     Depression     Esophageal reflux     Follows with Dr. Jackson    Fatigue     Fever 04/2015    Low grade symptoms of fever for 3 weeks.    Fibromyalgia     Gastroparesis     Health care maintenance     Heart palpitations     History of deep vein thrombophlebitis of lower extremity 2003    DVT Dx 2003    Hypokalemia     Hypothyroidism     Idiopathic peripheral neuropathy     Impaired fasting glucose     Insomnia     Lichen planus     tongue    LOM (loss of memory)     Mental status change     Episodes where he stares ahead and have some shaking of his limbs.  Possible seizure activity.  Referred to Dr. Santana, October 2, 2013.    Nausea     Onychomycosis of toenail     GAURANG (obstructive sleep apnea) 05/08/2007    Overnight polysomnogram.  Weight 247 pounds.  AHI mildly abnormal at 11 events per hour without sleep-related hypoxia noted.    Osteoarthritis of hip     PN (peripheral neuropathy)     Primary hypothyroidism     Rash     Tendonitis of left hip     Tendonitis of right hip     TIA (transient ischemic attack) 06/2016     "Tinea versicolor        Past Surgical History:   Procedure Laterality Date    APPENDECTOMY      BACK SURGERY      Cervical    CARDIAC CATHETERIZATION N/A 10/05/2022    Procedure: Left Heart Cath;  Surgeon: Shanta Johnson MD;  Location:  [facility];  Service: Cardiology;  Laterality: N/A;    CARDIAC CATHETERIZATION N/A 10/05/2022    Procedure: Coronary angiography;  Surgeon: Shanta Johnson MD;  Location:  [facility];  Service: Cardiology;  Laterality: N/A;    CARDIAC CATHETERIZATION N/A 11/5/2024    Procedure: Left Heart Cath;  Surgeon: Chriss Estrada MD;  Location:  [facility];  Service: Cardiovascular;  Laterality: N/A;    CARDIAC CATHETERIZATION N/A 11/5/2024    Procedure: Coronary angiography;  Surgeon: Chriss Estrada MD;  Location:  [facility];  Service: Cardiovascular;  Laterality: N/A;    CHOLECYSTECTOMY      COLONOSCOPY  12/2022    Normal.  Dr. Jackson    JOINT REPLACEMENT Bilateral     Hip    LUMBAR FUSION      SURAL NERVE/MUSCLE BIOPSY  04/2016    TONSILLECTOMY         Family History   Problem Relation Age of Onset    Heart failure Mother         Congestive    Heart disease Mother     Atrial fibrillation Mother     Alcohol abuse Paternal Uncle     Heart failure Paternal Grandmother         Congestive    No Known Problems Father     No Known Problems Sister     No Known Problems Brother     Heart failure Maternal Grandmother     Heart disease Maternal Grandmother     No Known Problems Maternal Grandfather     No Known Problems Paternal Grandfather        Social History     Tobacco Use    Smoking status: Never     Passive exposure: Never    Smokeless tobacco: Never   Vaping Use    Vaping status: Never Used   Substance Use Topics    Alcohol use: No    Drug use: No       Procedures       Objective:     Visit Vitals  /81   Pulse 100   Ht 188 cm (74\")   Wt 110 kg (243 lb 4.8 oz)   SpO2 90%   BMI 31.24 kg/m²         " Constitutional:       Appearance: Normal appearance. Well-developed.   HENT:      Head: Normocephalic and atraumatic.   Neck:      Vascular: No carotid bruit or JVD.   Pulmonary:      Effort: Pulmonary effort is normal.      Breath sounds: Normal breath sounds.   Cardiovascular:      Normal rate. Regular rhythm.      No gallop.    Pulses:     Radial: 2+ bilaterally.  Edema:     Peripheral edema absent.   Abdominal:      Palpations: Abdomen is soft.   Skin:     General: Skin is warm and dry.   Neurological:      Mental Status: Alert and oriented to person, place, and time.             Assessment:          Diagnosis Plan   1. Non-obstructive coronary artery disease        2. Chest pain, unspecified type        3. Acquired hypothyroidism        4. Fibromyalgia               Plan:       1.  Chest pressure/shortness of breath/fatigue.  Cardiac workup so far has been unremarkable.  Recent echocardiogram did not show any causes of his symptoms although there was some concern about an abnormal strain pattern even though there was no evidence of left ventricular hypertrophy.  He has since undergone a PYP scan which ruled out ATTR amyloid.  He underwent recent coronary angiograms which continues to show only mild nonobstructive disease.  The only real testing we could still consider would be in a cardiac MRI that I think it is unlikely that he has any infiltrative cardiac disease without any evidence of left ventricular hypertrophy by echocardiogram.  The patient is thinking about pursuing a second opinion at [facility].  I think this would be a good option and encouraged him to do so.  2.  Mild nonobstructive coronary artery disease.  Stable on his most recent cardiac catheterization.  On medical management with aspirin and statin.  3.  Hypothyroidism  4.  Fibromyalgia    Will plan on seeing the patient back again in 6 months.

## 2024-11-26 ENCOUNTER — OFFICE VISIT (OUTPATIENT)
Age: 64
End: 2024-11-26
Payer: MEDICARE

## 2024-11-26 VITALS
SYSTOLIC BLOOD PRESSURE: 122 MMHG | HEIGHT: 74 IN | DIASTOLIC BLOOD PRESSURE: 81 MMHG | HEART RATE: 100 BPM | BODY MASS INDEX: 31.22 KG/M2 | WEIGHT: 243.3 LBS | OXYGEN SATURATION: 90 %

## 2024-11-26 DIAGNOSIS — R07.9 CHEST PAIN, UNSPECIFIED TYPE: ICD-10-CM

## 2024-11-26 DIAGNOSIS — E03.9 ACQUIRED HYPOTHYROIDISM: ICD-10-CM

## 2024-11-26 DIAGNOSIS — I25.10 CHRONIC CORONARY ARTERY DISEASE: Primary | ICD-10-CM

## 2024-11-26 DIAGNOSIS — M79.7 FIBROMYALGIA: ICD-10-CM

## 2024-11-26 PROCEDURE — 99214 OFFICE O/P EST MOD 30 MIN: CPT | Performed by: INTERNAL MEDICINE

## 2024-11-26 PROCEDURE — 1159F MED LIST DOCD IN RCRD: CPT | Performed by: INTERNAL MEDICINE

## 2024-11-26 PROCEDURE — 1160F RVW MEDS BY RX/DR IN RCRD: CPT | Performed by: INTERNAL MEDICINE

## 2024-11-26 NOTE — LETTER
November 26, 2024     Cesar Hoover MD  [id number] [facility]  [address]  [place] [address]    Patient: Joel Keating   YOB: 1960   Date of Visit: 11/26/2024       Dear Cesar Hoover MD,    Joel Keating was in my office today. Below are the relevant portions of my assessment and plan of care.           If you have questions, please do not hesitate to call me. I look forward to following Joel along with you.         Sincerely,        Shanta Johnson MD        CC: No Recipients

## 2025-03-20 RX ORDER — ATORVASTATIN CALCIUM 10 MG/1
10 TABLET, FILM COATED ORAL DAILY
Qty: 30 TABLET | Refills: 3 | Status: SHIPPED | OUTPATIENT
Start: 2025-03-20

## 2025-07-23 ENCOUNTER — OFFICE VISIT (OUTPATIENT)
Dept: INTERNAL MEDICINE | Facility: CLINIC | Age: 65
End: 2025-07-23
Payer: MEDICARE

## 2025-07-23 ENCOUNTER — LAB (OUTPATIENT)
Dept: LAB | Facility: HOSPITAL | Age: 65
End: 2025-07-23
Payer: MEDICARE

## 2025-07-23 VITALS
DIASTOLIC BLOOD PRESSURE: 62 MMHG | WEIGHT: 233.6 LBS | HEART RATE: 104 BPM | SYSTOLIC BLOOD PRESSURE: 108 MMHG | OXYGEN SATURATION: 94 % | TEMPERATURE: 99.5 F | BODY MASS INDEX: 29.98 KG/M2 | HEIGHT: 74 IN

## 2025-07-23 DIAGNOSIS — R79.82 CRP ELEVATED: Primary | ICD-10-CM

## 2025-07-23 DIAGNOSIS — R68.82 DECREASED LIBIDO: ICD-10-CM

## 2025-07-23 DIAGNOSIS — R53.1 WEAKNESS: ICD-10-CM

## 2025-07-23 DIAGNOSIS — M79.7 FIBROMYALGIA: ICD-10-CM

## 2025-07-23 DIAGNOSIS — R10.84 GENERALIZED ABDOMINAL PAIN: ICD-10-CM

## 2025-07-23 DIAGNOSIS — G60.9 IDIOPATHIC PERIPHERAL NEUROPATHY: ICD-10-CM

## 2025-07-23 DIAGNOSIS — F51.01 PRIMARY INSOMNIA: ICD-10-CM

## 2025-07-23 DIAGNOSIS — G89.4 CHRONIC PAIN SYNDROME: ICD-10-CM

## 2025-07-23 LAB
AMYLASE SERPL-CCNC: 44 U/L (ref 28–100)
BASOPHILS # BLD AUTO: 0.03 10*3/MM3 (ref 0–0.2)
BASOPHILS NFR BLD AUTO: 0.4 % (ref 0–1.5)
CRP SERPL-MCNC: 0.39 MG/DL (ref 0–0.5)
DEPRECATED RDW RBC AUTO: 43.4 FL (ref 37–54)
EOSINOPHIL # BLD AUTO: 0.21 10*3/MM3 (ref 0–0.4)
EOSINOPHIL NFR BLD AUTO: 3 % (ref 0.3–6.2)
ERYTHROCYTE [DISTWIDTH] IN BLOOD BY AUTOMATED COUNT: 12.6 % (ref 12.3–15.4)
HCT VFR BLD AUTO: 46.3 % (ref 37.5–51)
HGB BLD-MCNC: 15.9 G/DL (ref 13–17.7)
IMM GRANULOCYTES # BLD AUTO: 0.01 10*3/MM3 (ref 0–0.05)
IMM GRANULOCYTES NFR BLD AUTO: 0.1 % (ref 0–0.5)
LIPASE SERPL-CCNC: 19 U/L (ref 13–60)
LYMPHOCYTES # BLD AUTO: 2.07 10*3/MM3 (ref 0.7–3.1)
LYMPHOCYTES NFR BLD AUTO: 29.8 % (ref 19.6–45.3)
MCH RBC QN AUTO: 32.7 PG (ref 26.6–33)
MCHC RBC AUTO-ENTMCNC: 34.3 G/DL (ref 31.5–35.7)
MCV RBC AUTO: 95.3 FL (ref 79–97)
MONOCYTES # BLD AUTO: 0.53 10*3/MM3 (ref 0.1–0.9)
MONOCYTES NFR BLD AUTO: 7.6 % (ref 5–12)
NEUTROPHILS NFR BLD AUTO: 4.1 10*3/MM3 (ref 1.7–7)
NEUTROPHILS NFR BLD AUTO: 59.1 % (ref 42.7–76)
NRBC BLD AUTO-RTO: 0 /100 WBC (ref 0–0.2)
PLATELET # BLD AUTO: 216 10*3/MM3 (ref 140–450)
PMV BLD AUTO: 8.8 FL (ref 6–12)
RBC # BLD AUTO: 4.86 10*6/MM3 (ref 4.14–5.8)
WBC NRBC COR # BLD AUTO: 6.95 10*3/MM3 (ref 3.4–10.8)

## 2025-07-23 PROCEDURE — 84403 ASSAY OF TOTAL TESTOSTERONE: CPT | Performed by: FAMILY MEDICINE

## 2025-07-23 PROCEDURE — 84402 ASSAY OF FREE TESTOSTERONE: CPT | Performed by: FAMILY MEDICINE

## 2025-07-23 PROCEDURE — 85025 COMPLETE CBC W/AUTO DIFF WBC: CPT | Performed by: FAMILY MEDICINE

## 2025-07-23 PROCEDURE — 83690 ASSAY OF LIPASE: CPT | Performed by: FAMILY MEDICINE

## 2025-07-23 PROCEDURE — 82150 ASSAY OF AMYLASE: CPT | Performed by: FAMILY MEDICINE

## 2025-07-23 PROCEDURE — 86140 C-REACTIVE PROTEIN: CPT | Performed by: FAMILY MEDICINE

## 2025-07-23 PROCEDURE — 83825 ASSAY OF MERCURY: CPT | Performed by: FAMILY MEDICINE

## 2025-07-23 PROCEDURE — 82300 ASSAY OF CADMIUM: CPT | Performed by: FAMILY MEDICINE

## 2025-07-23 PROCEDURE — 36415 COLL VENOUS BLD VENIPUNCTURE: CPT | Performed by: FAMILY MEDICINE

## 2025-07-23 PROCEDURE — 83655 ASSAY OF LEAD: CPT | Performed by: FAMILY MEDICINE

## 2025-07-23 PROCEDURE — 82175 ASSAY OF ARSENIC: CPT | Performed by: FAMILY MEDICINE

## 2025-07-23 RX ORDER — NORTRIPTYLINE HYDROCHLORIDE 50 MG/1
CAPSULE ORAL
COMMUNITY
Start: 2025-04-29 | End: 2025-07-23 | Stop reason: ALTCHOICE

## 2025-07-23 RX ORDER — LEVOTHYROXINE SODIUM 112 UG/1
112 TABLET ORAL
COMMUNITY

## 2025-07-23 RX ORDER — CYCLOBENZAPRINE HCL 10 MG
TABLET ORAL
COMMUNITY
End: 2025-07-23

## 2025-07-23 RX ORDER — KETOROLAC TROMETHAMINE 10 MG/1
TABLET, FILM COATED ORAL
COMMUNITY
Start: 2025-04-29

## 2025-07-23 NOTE — PROGRESS NOTES
"Chief Complaint  Shortness of Breath, Dizziness, and Abdominal Pain (Right side )    Subjective        Joel Keating presents to [facility]  History of Present Illness  Patient appointment to discuss chronic problems of shortness of breath dizziness abdominal pain he has had a history of pancreatitis in the past and he has some nausea no fever sweats or chills decreased appetite  He is taking multiple medications that have associated dizziness and shortness of breath including methadone gabapentin amitriptyline ondansetron tizanidine escitalopram.  Treating mostly chronic pain syndrome and neuropathy fibromyalgia osteoarthritis    Objective   Vital Signs:  /62   Pulse 104   Temp 99.5 °F (37.5 °C)   Ht 188 cm (74\")   Wt 106 kg (233 lb 9.6 oz)   SpO2 94%   BMI 29.99 kg/m²   Estimated body mass index is 29.99 kg/m² as calculated from the following:    Height as of this encounter: 188 cm (74\").    Weight as of this encounter: 106 kg (233 lb 9.6 oz).            Physical Exam  Vitals and nursing note reviewed.   HENT:      Head: Normocephalic and atraumatic.   Eyes:      General: No scleral icterus.  Cardiovascular:      Rate and Rhythm: Normal rate and regular rhythm.      Pulses: Normal pulses.      Heart sounds: Normal heart sounds.   Pulmonary:      Effort: Pulmonary effort is normal.      Breath sounds: Normal breath sounds.   Abdominal:      General: There is no distension.      Palpations: Abdomen is soft. There is no mass.      Tenderness: There is abdominal tenderness. There is no right CVA tenderness, left CVA tenderness, guarding or rebound.      Hernia: No hernia is present.   Musculoskeletal:      Right lower leg: No edema.      Left lower leg: No edema.   Neurological:      Mental Status: He is alert and oriented to person, place, and time.      Motor: Weakness present.      Coordination: Coordination abnormal.      Gait: Gait abnormal.   Psychiatric:         Mood " "and Affect: Mood normal.         Behavior: Behavior normal.        Result Review :    Common labs          11/5/2024    05:58 11/6/2024    03:57 12/5/2024    10:14   Common Labs   Glucose 96  166     BUN 21  18     Creatinine 1.50  1.34     Sodium 140  136     Potassium 4.8  4.1     Chloride 105  104     Calcium 8.8  8.8     WBC 5.39      Hemoglobin 13.7      Hematocrit 39.2      Platelets 166      Uric Acid   5          Details          This result is from an external source.                       Assessment and Plan   Diagnoses and all orders for this visit:    1. CRP elevated (Primary)    2. Generalized abdominal pain  -     Lipase  -     Amylase  -     CBC & Differential  -     Testosterone (Free & Total), LC / MS  -     Heavy Metals Profile II, Blood  -     C-reactive protein    3. Decreased libido  -     Testosterone (Free & Total), LC / MS    4. Fibromyalgia    5. Idiopathic peripheral neuropathy    6. Chronic pain syndrome    7. Primary insomnia    8. Weakness    Reduce gabapentin to 1200 mg a day  Decrease tizanidine to once daily at bedtime  Extend methadone dosing today every 18 hours for 2 weeks and then 1 every 24 hours ongoing     This patient has a PCP that is the continuing focal point for all health care services, and the patient sees this physician to be evaluated for abdominal pain. The inherent complexity that this code () captures is not in the clinical condition itself-- abdominal pain --but rather the cognitition of the continued responsibility of being the focal point for all needed services for this patient.\"   I spent 46 minutes caring for Joel on this date of service. This time includes time spent by me in the following activities:reviewing tests, performing a medically appropriate examination and/or evaluation , counseling and educating the patient/family/caregiver, ordering medications, tests, or procedures, referring and communicating with other health care professionals , and " documenting information in the medical record  Follow Up   Return in about 1 month (around 8/23/2025), or if symptoms worsen or fail to improve, for Recheck.  Patient was given instructions and counseling regarding his condition or for health maintenance advice. Please see specific information pulled into the AVS if appropriate.

## 2025-07-28 LAB
ARSENIC BLD-MCNC: 1 UG/L (ref 0–9)
CADMIUM BLD-MCNC: <0.5 UG/L (ref 0–1.2)
LEAD BLDV-MCNC: 2.3 UG/DL (ref 0–3.4)
MERCURY BLD-MCNC: <1 UG/L (ref 0–14.9)
TESTOST FREE SERPL-MCNC: 1.9 PG/ML (ref 6.6–18.1)
TESTOST SERPL-MCNC: 313.3 NG/DL (ref 264–916)

## 2025-07-29 RX ORDER — ATORVASTATIN CALCIUM 10 MG/1
10 TABLET, FILM COATED ORAL DAILY
Qty: 90 TABLET | Refills: 1 | Status: SHIPPED | OUTPATIENT
Start: 2025-07-29

## (undated) DEVICE — GLIDESHEATH SLENDER STAINLESS STEEL KIT: Brand: GLIDESHEATH SLENDER

## (undated) DEVICE — TR BAND RADIAL ARTERY COMPRESSION DEVICE: Brand: TR BAND

## (undated) DEVICE — DGW .035 FC J3MM 260CM TEF: Brand: EMERALD

## (undated) DEVICE — CATH DIAG IMPULSE FL4 5F 100CM

## (undated) DEVICE — PK CATH CARD 40

## (undated) DEVICE — GLIDESHEATH BASIC HYDROPHILIC COATED INTRODUCER SHEATH: Brand: GLIDESHEATH

## (undated) DEVICE — KT MANIFLD CARDIAC

## (undated) DEVICE — CATH DIAG IMPULSE FR4 5F 100CM

## (undated) DEVICE — GW EMR FIX EXCHG J STD .035 3MM 260CM

## (undated) DEVICE — RADIFOCUS OPTITORQUE ANGIOGRAPHIC CATHETER: Brand: OPTITORQUE

## (undated) DEVICE — CATH F5 INF PIG145 110CM 6SH: Brand: INFINITI

## (undated) DEVICE — CATH DIAG IMPULSE FL3.5 5F 100CM